# Patient Record
Sex: FEMALE | Race: WHITE | Employment: PART TIME | URBAN - METROPOLITAN AREA
[De-identification: names, ages, dates, MRNs, and addresses within clinical notes are randomized per-mention and may not be internally consistent; named-entity substitution may affect disease eponyms.]

---

## 2020-06-23 ENCOUNTER — OFFICE VISIT (OUTPATIENT)
Dept: VASCULAR SURGERY | Age: 60
End: 2020-06-23
Payer: COMMERCIAL

## 2020-06-23 VITALS
SYSTOLIC BLOOD PRESSURE: 90 MMHG | DIASTOLIC BLOOD PRESSURE: 68 MMHG | BODY MASS INDEX: 23.85 KG/M2 | WEIGHT: 134.6 LBS | HEIGHT: 63 IN

## 2020-06-23 PROCEDURE — 99203 OFFICE O/P NEW LOW 30 MIN: CPT | Performed by: SURGERY

## 2020-06-23 RX ORDER — AMLODIPINE BESYLATE 10 MG/1
10 TABLET ORAL DAILY
COMMUNITY

## 2020-06-23 RX ORDER — ASPIRIN 81 MG/1
81 TABLET, CHEWABLE ORAL DAILY
COMMUNITY

## 2020-06-23 RX ORDER — TRAZODONE HYDROCHLORIDE 100 MG/1
200 TABLET ORAL NIGHTLY
COMMUNITY

## 2020-06-23 RX ORDER — ESCITALOPRAM OXALATE 10 MG/1
10 TABLET ORAL DAILY
COMMUNITY

## 2020-06-23 RX ORDER — METOPROLOL SUCCINATE 50 MG/1
100 TABLET, EXTENDED RELEASE ORAL DAILY
COMMUNITY

## 2020-06-23 RX ORDER — ALPRAZOLAM 0.5 MG/1
0.5 TABLET ORAL 3 TIMES DAILY PRN
COMMUNITY

## 2020-06-23 RX ORDER — PANTOPRAZOLE SODIUM 40 MG/1
40 GRANULE, DELAYED RELEASE ORAL NIGHTLY
COMMUNITY

## 2020-06-23 RX ORDER — LISINOPRIL 20 MG/1
20 TABLET ORAL DAILY
Status: ON HOLD | COMMUNITY
End: 2022-09-27 | Stop reason: HOSPADM

## 2020-06-23 SDOH — HEALTH STABILITY: MENTAL HEALTH: HOW OFTEN DO YOU HAVE A DRINK CONTAINING ALCOHOL?: 2-3 TIMES A WEEK

## 2020-06-23 NOTE — PROGRESS NOTES
[facility])  Consultation/History & Physical    Date of Admission:  (Not on file)  Date of Consultation:  6/23/2020    PCP:  No primary care provider on file. Other:  Aurelio    Chief Complaint:    Chief Complaint   Patient presents with    Other     patient ref by DR Rod Chi for carotid artery stenosis. pamlr           History of Present Illness:  Antwan Rod is a 62 y.o. female who presents with history of hypertension and tobacco abuse. She's had 2 episodes of transient left eye visual loss. The last one was 3 weeks ago. She's been started on aspirin. Carotid duplex was performed showing 80-99% left ICA stenosis and as a result referred for vascular surgery. PMH:   has no past medical history on file. PSH:   has no past surgical history on file. Allergies: Allergies   Allergen Reactions    Penicillins     Nuts [Peanut-Containing Drug Products]         Home Meds:    Prior to Admission medications    Not on [facility] Meds:    No current outpatient medications on file. No current facility-administered medications for this visit.         Social History:       Social History     Socioeconomic History    Marital status: None     Spouse name: None    Number of children: None    Years of education: None    Highest education level: None   Occupational History    None   Social Needs    Financial resource strain: None    Food insecurity     Worry: None     Inability: None    Transportation needs     Medical: None     Non-medical: None   Tobacco Use    Smoking status: Current Every Day Smoker     Packs/day: 0.50     Types: Cigarettes    Smokeless tobacco: Never Used   Substance and Sexual Activity    Alcohol use: Yes     Frequency: 2-3 times a week    Drug use: None    Sexual activity: None   Lifestyle    Physical activity     Days per week: None     Minutes per session: None    Stress: None   Relationships    Social connections     Talks on phone: None     Gets

## 2020-06-25 ENCOUNTER — TELEPHONE (OUTPATIENT)
Dept: VASCULAR SURGERY | Age: 60
End: 2020-06-25

## 2020-06-29 ENCOUNTER — TELEPHONE (OUTPATIENT)
Dept: VASCULAR SURGERY | Age: 60
End: 2020-06-29

## 2020-06-29 NOTE — TELEPHONE ENCOUNTER
Patient ended up returning my call as I left messages with her son. She did give me an updated phone number as well as we had wrong home phone number. She was notified of her 7/20/20 surgery date and to arrive at 5:30am for 7:30am surgery. Npo after midnight. She was told needs to get COVID test 7 days prior to surgery. As well as pre admission testing. [email or web address]

## 2020-06-30 ENCOUNTER — PREP FOR PROCEDURE (OUTPATIENT)
Dept: VASCULAR SURGERY | Age: 60
End: 2020-06-30

## 2020-06-30 RX ORDER — SODIUM CHLORIDE 0.9 % (FLUSH) 0.9 %
10 SYRINGE (ML) INJECTION PRN
Status: CANCELLED | OUTPATIENT
Start: 2020-06-30

## 2020-06-30 RX ORDER — SODIUM CHLORIDE 0.9 % (FLUSH) 0.9 %
10 SYRINGE (ML) INJECTION EVERY 12 HOURS SCHEDULED
Status: CANCELLED | OUTPATIENT
Start: 2020-06-30

## 2020-07-13 ENCOUNTER — TELEPHONE (OUTPATIENT)
Dept: VASCULAR SURGERY | Age: 60
End: 2020-07-13

## 2020-07-13 ENCOUNTER — OFFICE VISIT (OUTPATIENT)
Dept: PRIMARY CARE CLINIC | Age: 60
End: 2020-07-13
Payer: COMMERCIAL

## 2020-07-13 ENCOUNTER — HOSPITAL ENCOUNTER (OUTPATIENT)
Age: 60
Discharge: HOME | End: 2020-07-13
Payer: COMMERCIAL

## 2020-07-13 LAB
ABO/RH: NORMAL
ANION GAP SERPL CALCULATED.3IONS-SCNC: 13 MMOL/L (ref 3–16)
ANTIBODY SCREEN: NORMAL
APTT: 33.7 SEC (ref 24.2–36.2)
BILIRUBIN URINE: NEGATIVE
BLOOD, URINE: ABNORMAL
BUN BLDV-MCNC: 21 MG/DL (ref 7–20)
CALCIUM SERPL-MCNC: 9.8 MG/DL (ref 8.3–10.6)
CHLORIDE BLD-SCNC: 81 MMOL/L (ref 99–110)
CLARITY: CLEAR
CO2: 26 MMOL/L (ref 21–32)
COLOR: YELLOW
CREAT SERPL-MCNC: 0.8 MG/DL (ref 0.6–1.2)
EPITHELIAL CELLS, UA: 2 /HPF (ref 0–5)
GFR AFRICAN AMERICAN: >60
GFR NON-AFRICAN AMERICAN: >60
GLUCOSE BLD-MCNC: 98 MG/DL (ref 70–99)
GLUCOSE URINE: NEGATIVE MG/DL
HCT VFR BLD CALC: 42 % (ref 36–48)
HEMOGLOBIN: 14.6 G/DL (ref 12–16)
HYALINE CASTS: 0 /LPF (ref 0–8)
INR BLD: 0.87 (ref 0.86–1.14)
KETONES, URINE: NEGATIVE MG/DL
LEUKOCYTE ESTERASE, URINE: NEGATIVE
MCH RBC QN AUTO: 31.1 PG (ref 26–34)
MCHC RBC AUTO-ENTMCNC: 34.7 G/DL (ref 31–36)
MCV RBC AUTO: 89.4 FL (ref 80–100)
MICROSCOPIC EXAMINATION: YES
NITRITE, URINE: NEGATIVE
PDW BLD-RTO: 13.5 % (ref 12.4–15.4)
PH UA: 7 (ref 5–8)
PLATELET # BLD: 225 K/UL (ref 135–450)
PMV BLD AUTO: 6.9 FL (ref 5–10.5)
POTASSIUM SERPL-SCNC: 3.9 MMOL/L (ref 3.5–5.1)
PROTEIN UA: NEGATIVE MG/DL
PROTHROMBIN TIME: 10.1 SEC (ref 10–13.2)
RBC # BLD: 4.69 M/UL (ref 4–5.2)
RBC UA: 4 /HPF (ref 0–4)
SODIUM BLD-SCNC: 120 MMOL/L (ref 136–145)
SPECIFIC GRAVITY UA: 1.01 (ref 1–1.03)
URINE TYPE: ABNORMAL
UROBILINOGEN, URINE: 0.2 E.U./DL
WBC # BLD: 9.6 K/UL (ref 4–11)
WBC UA: 0 /HPF (ref 0–5)

## 2020-07-13 PROCEDURE — 86901 BLOOD TYPING SEROLOGIC RH(D): CPT

## 2020-07-13 PROCEDURE — 36415 COLL VENOUS BLD VENIPUNCTURE: CPT

## 2020-07-13 PROCEDURE — 86900 BLOOD TYPING SEROLOGIC ABO: CPT

## 2020-07-13 PROCEDURE — 85730 THROMBOPLASTIN TIME PARTIAL: CPT

## 2020-07-13 PROCEDURE — 99211 OFF/OP EST MAY X REQ PHY/QHP: CPT | Performed by: NURSE PRACTITIONER

## 2020-07-13 PROCEDURE — 93005 ELECTROCARDIOGRAM TRACING: CPT | Performed by: ANESTHESIOLOGY

## 2020-07-13 PROCEDURE — 85027 COMPLETE CBC AUTOMATED: CPT

## 2020-07-13 PROCEDURE — 85610 PROTHROMBIN TIME: CPT

## 2020-07-13 PROCEDURE — 80048 BASIC METABOLIC PNL TOTAL CA: CPT

## 2020-07-13 PROCEDURE — 86850 RBC ANTIBODY SCREEN: CPT

## 2020-07-13 PROCEDURE — 81001 URINALYSIS AUTO W/SCOPE: CPT

## 2020-07-13 NOTE — PATIENT INSTRUCTIONS

## 2020-07-13 NOTE — TELEPHONE ENCOUNTER
----- Message from ROOSEVELT Lewis CNP sent at 7/13/2020  1:21 PM EDT -----  Patient is scheduled for left CEA on 7/20. Her pre-op labs show sodium of 120. Can you fax results to PCP and she will need to follow up with them regarding this prior to surgery. Thanks.

## 2020-07-13 NOTE — PROGRESS NOTES
Lauren Chris received a viral test for COVID-19. They were educated on isolation and quarantine as appropriate. For any symptoms, they were directed to seek care from their PCP, given contact information to establish with a doctor, directed to an urgent care or the emergency room. Patient was seen today for pre op Covid testing.

## 2020-07-14 ENCOUNTER — HOSPITAL ENCOUNTER (INPATIENT)
Age: 60
LOS: 7 days | Discharge: HOME | DRG: 629 | End: 2020-07-21
Attending: EMERGENCY MEDICINE | Admitting: INTERNAL MEDICINE
Payer: COMMERCIAL

## 2020-07-14 ENCOUNTER — APPOINTMENT (OUTPATIENT)
Dept: CT IMAGING | Age: 60
DRG: 629 | End: 2020-07-14
Payer: COMMERCIAL

## 2020-07-14 PROBLEM — E87.1 HYPONATREMIA: Status: ACTIVE | Noted: 2020-07-14

## 2020-07-14 LAB
ABO/RH: NORMAL
ANION GAP SERPL CALCULATED.3IONS-SCNC: 14 MMOL/L (ref 3–16)
ANTIBODY SCREEN: NORMAL
BASOPHILS ABSOLUTE: 0 K/UL (ref 0–0.2)
BASOPHILS RELATIVE PERCENT: 0.7 %
BUN BLDV-MCNC: 18 MG/DL (ref 7–20)
CALCIUM SERPL-MCNC: 10.4 MG/DL (ref 8.3–10.6)
CHLORIDE BLD-SCNC: 82 MMOL/L (ref 99–110)
CO2: 26 MMOL/L (ref 21–32)
CREAT SERPL-MCNC: 0.8 MG/DL (ref 0.6–1.2)
EKG ATRIAL RATE: 51 BPM
EKG DIAGNOSIS: NORMAL
EKG P AXIS: 50 DEGREES
EKG P-R INTERVAL: 148 MS
EKG Q-T INTERVAL: 482 MS
EKG QRS DURATION: 90 MS
EKG QTC CALCULATION (BAZETT): 444 MS
EKG R AXIS: 30 DEGREES
EKG T AXIS: 51 DEGREES
EKG VENTRICULAR RATE: 51 BPM
EOSINOPHILS ABSOLUTE: 0.2 K/UL (ref 0–0.6)
EOSINOPHILS RELATIVE PERCENT: 3.1 %
GFR AFRICAN AMERICAN: >60
GFR NON-AFRICAN AMERICAN: >60
GLUCOSE BLD-MCNC: 92 MG/DL (ref 70–99)
HCT VFR BLD CALC: 43.2 % (ref 36–48)
HEMOGLOBIN: 15.2 G/DL (ref 12–16)
INR BLD: 0.96 (ref 0.86–1.14)
LYMPHOCYTES ABSOLUTE: 1.2 K/UL (ref 1–5.1)
LYMPHOCYTES RELATIVE PERCENT: 21.6 %
MAGNESIUM: 2 MG/DL (ref 1.8–2.4)
MCH RBC QN AUTO: 31.3 PG (ref 26–34)
MCHC RBC AUTO-ENTMCNC: 35.1 G/DL (ref 31–36)
MCV RBC AUTO: 89.2 FL (ref 80–100)
MONOCYTES ABSOLUTE: 0.4 K/UL (ref 0–1.3)
MONOCYTES RELATIVE PERCENT: 7.1 %
NEUTROPHILS ABSOLUTE: 3.8 K/UL (ref 1.7–7.7)
NEUTROPHILS RELATIVE PERCENT: 67.5 %
PDW BLD-RTO: 13.5 % (ref 12.4–15.4)
PLATELET # BLD: 253 K/UL (ref 135–450)
PMV BLD AUTO: 6.4 FL (ref 5–10.5)
POTASSIUM REFLEX MAGNESIUM: 3.4 MMOL/L (ref 3.5–5.1)
PROTHROMBIN TIME: 11.1 SEC (ref 10–13.2)
RBC # BLD: 4.84 M/UL (ref 4–5.2)
SODIUM BLD-SCNC: 122 MMOL/L (ref 136–145)
WBC # BLD: 5.6 K/UL (ref 4–11)

## 2020-07-14 PROCEDURE — 70450 CT HEAD/BRAIN W/O DYE: CPT

## 2020-07-14 PROCEDURE — 36415 COLL VENOUS BLD VENIPUNCTURE: CPT

## 2020-07-14 PROCEDURE — 6360000002 HC RX W HCPCS: Performed by: PHYSICIAN ASSISTANT

## 2020-07-14 PROCEDURE — 82533 TOTAL CORTISOL: CPT

## 2020-07-14 PROCEDURE — 93005 ELECTROCARDIOGRAM TRACING: CPT | Performed by: EMERGENCY MEDICINE

## 2020-07-14 PROCEDURE — 2580000003 HC RX 258: Performed by: PHYSICIAN ASSISTANT

## 2020-07-14 PROCEDURE — 83735 ASSAY OF MAGNESIUM: CPT

## 2020-07-14 PROCEDURE — 93010 ELECTROCARDIOGRAM REPORT: CPT | Performed by: INTERNAL MEDICINE

## 2020-07-14 PROCEDURE — 6370000000 HC RX 637 (ALT 250 FOR IP): Performed by: PHYSICIAN ASSISTANT

## 2020-07-14 PROCEDURE — 85025 COMPLETE CBC W/AUTO DIFF WBC: CPT

## 2020-07-14 PROCEDURE — 86901 BLOOD TYPING SEROLOGIC RH(D): CPT

## 2020-07-14 PROCEDURE — 6370000000 HC RX 637 (ALT 250 FOR IP): Performed by: EMERGENCY MEDICINE

## 2020-07-14 PROCEDURE — 80048 BASIC METABOLIC PNL TOTAL CA: CPT

## 2020-07-14 PROCEDURE — 84443 ASSAY THYROID STIM HORMONE: CPT

## 2020-07-14 PROCEDURE — 84550 ASSAY OF BLOOD/URIC ACID: CPT

## 2020-07-14 PROCEDURE — 70496 CT ANGIOGRAPHY HEAD: CPT

## 2020-07-14 PROCEDURE — 83930 ASSAY OF BLOOD OSMOLALITY: CPT

## 2020-07-14 PROCEDURE — 96374 THER/PROPH/DIAG INJ IV PUSH: CPT

## 2020-07-14 PROCEDURE — 96375 TX/PRO/DX INJ NEW DRUG ADDON: CPT

## 2020-07-14 PROCEDURE — 86850 RBC ANTIBODY SCREEN: CPT

## 2020-07-14 PROCEDURE — 85610 PROTHROMBIN TIME: CPT

## 2020-07-14 PROCEDURE — 2060000000 HC ICU INTERMEDIATE R&B

## 2020-07-14 PROCEDURE — 86900 BLOOD TYPING SEROLOGIC ABO: CPT

## 2020-07-14 PROCEDURE — 99285 EMERGENCY DEPT VISIT HI MDM: CPT

## 2020-07-14 PROCEDURE — 6360000004 HC RX CONTRAST MEDICATION: Performed by: EMERGENCY MEDICINE

## 2020-07-14 PROCEDURE — 6370000000 HC RX 637 (ALT 250 FOR IP)

## 2020-07-14 RX ORDER — 0.9 % SODIUM CHLORIDE 0.9 %
500 INTRAVENOUS SOLUTION INTRAVENOUS ONCE
Status: COMPLETED | OUTPATIENT
Start: 2020-07-14 | End: 2020-07-14

## 2020-07-14 RX ORDER — ALPRAZOLAM 0.5 MG/1
0.5 TABLET ORAL NIGHTLY PRN
Status: DISCONTINUED | OUTPATIENT
Start: 2020-07-14 | End: 2020-07-21 | Stop reason: HOSPADM

## 2020-07-14 RX ORDER — DOXYCYCLINE HYCLATE 100 MG/1
CAPSULE ORAL
COMMUNITY
Start: 2019-01-26 | End: 2020-09-02

## 2020-07-14 RX ORDER — PROMETHAZINE HYDROCHLORIDE 25 MG/1
12.5 TABLET ORAL EVERY 6 HOURS PRN
Status: DISCONTINUED | OUTPATIENT
Start: 2020-07-14 | End: 2020-07-20

## 2020-07-14 RX ORDER — CLOPIDOGREL BISULFATE 75 MG/1
75 TABLET ORAL ONCE
Status: COMPLETED | OUTPATIENT
Start: 2020-07-14 | End: 2020-07-14

## 2020-07-14 RX ORDER — SODIUM CHLORIDE 0.9 % (FLUSH) 0.9 %
10 SYRINGE (ML) INJECTION PRN
Status: DISCONTINUED | OUTPATIENT
Start: 2020-07-14 | End: 2020-07-21 | Stop reason: HOSPADM

## 2020-07-14 RX ORDER — SODIUM CHLORIDE 0.9 % (FLUSH) 0.9 %
10 SYRINGE (ML) INJECTION EVERY 12 HOURS SCHEDULED
Status: DISCONTINUED | OUTPATIENT
Start: 2020-07-14 | End: 2020-07-21 | Stop reason: HOSPADM

## 2020-07-14 RX ORDER — SIMVASTATIN 20 MG
20 TABLET ORAL NIGHTLY
COMMUNITY
Start: 2020-06-10 | End: 2021-06-10

## 2020-07-14 RX ORDER — ACETAMINOPHEN 500 MG
1000 TABLET ORAL EVERY 6 HOURS PRN
COMMUNITY

## 2020-07-14 RX ORDER — SODIUM CHLORIDE 9 MG/ML
INJECTION, SOLUTION INTRAVENOUS CONTINUOUS
Status: DISCONTINUED | OUTPATIENT
Start: 2020-07-14 | End: 2020-07-15

## 2020-07-14 RX ORDER — ESCITALOPRAM OXALATE 10 MG/1
10 TABLET ORAL DAILY
Status: DISCONTINUED | OUTPATIENT
Start: 2020-07-15 | End: 2020-07-21 | Stop reason: HOSPADM

## 2020-07-14 RX ORDER — TIZANIDINE HYDROCHLORIDE 4 MG/1
12 CAPSULE, GELATIN COATED ORAL NIGHTLY
COMMUNITY

## 2020-07-14 RX ORDER — ASPIRIN 81 MG/1
324 TABLET, CHEWABLE ORAL ONCE
Status: COMPLETED | OUTPATIENT
Start: 2020-07-14 | End: 2020-07-14

## 2020-07-14 RX ORDER — LORATADINE 10 MG/1
10 CAPSULE, LIQUID FILLED ORAL DAILY
Status: ON HOLD | COMMUNITY
End: 2022-09-27 | Stop reason: HOSPADM

## 2020-07-14 RX ORDER — POTASSIUM CHLORIDE 7.45 MG/ML
10 INJECTION INTRAVENOUS PRN
Status: DISCONTINUED | OUTPATIENT
Start: 2020-07-14 | End: 2020-07-21 | Stop reason: HOSPADM

## 2020-07-14 RX ORDER — ALPRAZOLAM 0.5 MG/1
0.5 TABLET ORAL ONCE
Status: COMPLETED | OUTPATIENT
Start: 2020-07-14 | End: 2020-07-14

## 2020-07-14 RX ORDER — OFLOXACIN 3 MG/ML
5 SOLUTION AURICULAR (OTIC) DAILY
COMMUNITY
Start: 2020-07-10

## 2020-07-14 RX ORDER — ACETAMINOPHEN 325 MG/1
650 TABLET ORAL EVERY 6 HOURS PRN
Status: DISCONTINUED | OUTPATIENT
Start: 2020-07-14 | End: 2020-07-20 | Stop reason: DRUGHIGH

## 2020-07-14 RX ORDER — TRAZODONE HYDROCHLORIDE 50 MG/1
150 TABLET ORAL NIGHTLY
Status: DISCONTINUED | OUTPATIENT
Start: 2020-07-14 | End: 2020-07-21 | Stop reason: HOSPADM

## 2020-07-14 RX ORDER — DIPHENHYDRAMINE HYDROCHLORIDE 50 MG/ML
12.5 INJECTION INTRAMUSCULAR; INTRAVENOUS ONCE
Status: COMPLETED | OUTPATIENT
Start: 2020-07-14 | End: 2020-07-14

## 2020-07-14 RX ORDER — ASPIRIN 325 MG
325 TABLET ORAL DAILY
Status: DISCONTINUED | OUTPATIENT
Start: 2020-07-15 | End: 2020-07-21 | Stop reason: HOSPADM

## 2020-07-14 RX ORDER — FLUTICASONE PROPIONATE 50 MCG
1 SPRAY, SUSPENSION (ML) NASAL DAILY
Status: ON HOLD | COMMUNITY
End: 2022-09-27 | Stop reason: HOSPADM

## 2020-07-14 RX ORDER — PANTOPRAZOLE SODIUM 40 MG/1
40 TABLET, DELAYED RELEASE ORAL
Status: DISCONTINUED | OUTPATIENT
Start: 2020-07-14 | End: 2020-07-21 | Stop reason: HOSPADM

## 2020-07-14 RX ORDER — FLUTICASONE PROPIONATE 50 MCG
1 SPRAY, SUSPENSION (ML) NASAL DAILY
Status: DISCONTINUED | OUTPATIENT
Start: 2020-07-15 | End: 2020-07-21 | Stop reason: HOSPADM

## 2020-07-14 RX ORDER — CLOPIDOGREL BISULFATE 75 MG/1
75 TABLET ORAL DAILY
Status: DISCONTINUED | OUTPATIENT
Start: 2020-07-15 | End: 2020-07-21 | Stop reason: HOSPADM

## 2020-07-14 RX ORDER — METOCLOPRAMIDE HYDROCHLORIDE 5 MG/ML
10 INJECTION INTRAMUSCULAR; INTRAVENOUS ONCE
Status: COMPLETED | OUTPATIENT
Start: 2020-07-14 | End: 2020-07-14

## 2020-07-14 RX ORDER — MIDODRINE HYDROCHLORIDE 5 MG/1
5 TABLET ORAL ONCE
Status: COMPLETED | OUTPATIENT
Start: 2020-07-14 | End: 2020-07-14

## 2020-07-14 RX ORDER — DIAPER,BRIEF,INFANT-TODD,DISP
EACH MISCELLANEOUS
COMMUNITY

## 2020-07-14 RX ORDER — TIZANIDINE 4 MG/1
4 TABLET ORAL 3 TIMES DAILY
Status: DISCONTINUED | OUTPATIENT
Start: 2020-07-14 | End: 2020-07-21 | Stop reason: HOSPADM

## 2020-07-14 RX ORDER — ATORVASTATIN CALCIUM 10 MG/1
10 TABLET, FILM COATED ORAL NIGHTLY
Status: DISCONTINUED | OUTPATIENT
Start: 2020-07-14 | End: 2020-07-21 | Stop reason: HOSPADM

## 2020-07-14 RX ORDER — POTASSIUM CHLORIDE 20 MEQ/1
40 TABLET, EXTENDED RELEASE ORAL PRN
Status: DISCONTINUED | OUTPATIENT
Start: 2020-07-14 | End: 2020-07-21 | Stop reason: HOSPADM

## 2020-07-14 RX ORDER — ALPRAZOLAM 0.5 MG/1
TABLET ORAL
Status: COMPLETED
Start: 2020-07-14 | End: 2020-07-14

## 2020-07-14 RX ORDER — SODIUM CHLORIDE 9 MG/ML
INJECTION, SOLUTION INTRAVENOUS CONTINUOUS
Status: DISCONTINUED | OUTPATIENT
Start: 2020-07-14 | End: 2020-07-14

## 2020-07-14 RX ORDER — ONDANSETRON 2 MG/ML
4 INJECTION INTRAMUSCULAR; INTRAVENOUS EVERY 6 HOURS PRN
Status: DISCONTINUED | OUTPATIENT
Start: 2020-07-14 | End: 2020-07-20

## 2020-07-14 RX ORDER — LISINOPRIL AND HYDROCHLOROTHIAZIDE 20; 12.5 MG/1; MG/1
1 TABLET ORAL DAILY
Status: ON HOLD | COMMUNITY
End: 2020-07-21 | Stop reason: HOSPADM

## 2020-07-14 RX ORDER — KETOROLAC TROMETHAMINE 30 MG/ML
15 INJECTION, SOLUTION INTRAMUSCULAR; INTRAVENOUS ONCE
Status: COMPLETED | OUTPATIENT
Start: 2020-07-14 | End: 2020-07-14

## 2020-07-14 RX ORDER — ACETAMINOPHEN 650 MG/1
650 SUPPOSITORY RECTAL EVERY 6 HOURS PRN
Status: DISCONTINUED | OUTPATIENT
Start: 2020-07-14 | End: 2020-07-20 | Stop reason: DRUGHIGH

## 2020-07-14 RX ADMIN — ASPIRIN 324 MG: 81 TABLET, CHEWABLE ORAL at 20:28

## 2020-07-14 RX ADMIN — DIPHENHYDRAMINE HYDROCHLORIDE 12.5 MG: 50 INJECTION, SOLUTION INTRAMUSCULAR; INTRAVENOUS at 16:42

## 2020-07-14 RX ADMIN — MIDODRINE HYDROCHLORIDE 5 MG: 5 TABLET ORAL at 21:51

## 2020-07-14 RX ADMIN — CLOPIDOGREL 75 MG: 75 TABLET, FILM COATED ORAL at 20:28

## 2020-07-14 RX ADMIN — SODIUM CHLORIDE: 9 INJECTION, SOLUTION INTRAVENOUS at 18:19

## 2020-07-14 RX ADMIN — KETOROLAC TROMETHAMINE 15 MG: 30 INJECTION, SOLUTION INTRAMUSCULAR at 16:42

## 2020-07-14 RX ADMIN — ALPRAZOLAM 0.5 MG: 0.5 TABLET ORAL at 16:44

## 2020-07-14 RX ADMIN — IOPAMIDOL 75 ML: 755 INJECTION, SOLUTION INTRAVENOUS at 17:36

## 2020-07-14 RX ADMIN — METOCLOPRAMIDE HYDROCHLORIDE 10 MG: 5 INJECTION INTRAMUSCULAR; INTRAVENOUS at 16:42

## 2020-07-14 RX ADMIN — SODIUM CHLORIDE 500 ML: 9 INJECTION, SOLUTION INTRAVENOUS at 16:42

## 2020-07-14 ASSESSMENT — PAIN DESCRIPTION - ORIENTATION: ORIENTATION: RIGHT;LEFT

## 2020-07-14 ASSESSMENT — PAIN DESCRIPTION - ONSET
ONSET: ON-GOING
ONSET: ON-GOING

## 2020-07-14 ASSESSMENT — PAIN DESCRIPTION - PAIN TYPE
TYPE: ACUTE PAIN

## 2020-07-14 ASSESSMENT — PAIN DESCRIPTION - DESCRIPTORS
DESCRIPTORS: POUNDING
DESCRIPTORS: POUNDING

## 2020-07-14 ASSESSMENT — ENCOUNTER SYMPTOMS
SORE THROAT: 0
ABDOMINAL PAIN: 0
COUGH: 0
VOMITING: 0
NAUSEA: 0
DIARRHEA: 0
BACK PAIN: 0
EYE PAIN: 0
SHORTNESS OF BREATH: 0
RHINORRHEA: 0
CONSTIPATION: 0

## 2020-07-14 ASSESSMENT — PAIN DESCRIPTION - FREQUENCY
FREQUENCY: CONTINUOUS
FREQUENCY: CONTINUOUS

## 2020-07-14 ASSESSMENT — PAIN SCALES - GENERAL
PAINLEVEL_OUTOF10: 10

## 2020-07-14 ASSESSMENT — PAIN DESCRIPTION - LOCATION
LOCATION: HEAD

## 2020-07-14 NOTE — ED PROVIDER NOTES
sinus  Axis: normal  Intervals: normal AL, narrow QRS, normal QTc  ST segments: no ST elevations or depressions  T waves: no abnormal inversions  Non-specific T wave changes: not present  Prior EKG comparison: EKG dated yesterday is not significantly different    MDM:  Diagnostic considerations included migraine, meningitis, subarachnoid hemorrhage, head injury/trauma, cluster headache, intracranial bleed/mass, stroke'    ED course was notable for notable hyponatremia to the point where I do feel she requires admission to the hospital.  She has no focal acute neurologic deficits on examination and was scheduled for carotid endarterectomy on the left on Monday. However given the electrolyte abnormality she will be admitted. CTA does confirm near occlusive disease to the left carotid although she has no strokelike symptoms. Dr. Alphonso Spangler from vascular surgery was consulted about the patient's ED history, physical, workup, and course so far. Recommendations from this consultant included antiplatelet agents only and they will follow. During the patient's ED course, the patient was given:  Medications   0.9 % sodium chloride infusion ( Intravenous New Bag 7/14/20 1819)   0.9 % sodium chloride bolus (0 mLs Intravenous Stopped 7/14/20 1756)   ketorolac (TORADOL) injection 15 mg (15 mg Intravenous Given 7/14/20 1642)   metoclopramide (REGLAN) injection 10 mg (10 mg Intravenous Given 7/14/20 1642)   diphenhydrAMINE (BENADRYL) injection 12.5 mg (12.5 mg Intravenous Given 7/14/20 1642)   ALPRAZolam (XANAX) tablet 0.5 mg (0.5 mg Oral Given 7/14/20 1644)   iopamidol (ISOVUE-370) 76 % injection 75 mL (75 mLs Intravenous Given 7/14/20 1736)        CLINICAL IMPRESSION  1. Hyponatremia    2. Acute nonintractable headache, unspecified headache type    3. Left carotid artery occlusion        DISPOSITION  Neelam Carvajal was admitted in fair condition.     The plan is to admit to the hospital at this time under the

## 2020-07-14 NOTE — H&P
outpatient by vascular surgery on 6/23/20 and L carotid endarterectomy was scheduled for 7/20/20. Work-up initiated in ED. CT head negative for acute process. CTA head and neck did confirm near total occlusion of proximal L ICA without full distal lumen collapse. Sodium is 122, up from 120 yesterday. Of note, patient did have hyponatremia noted on outpatient labs obrained 6/2/20 (Na 125). However, this had resolved without intervention upon hospital admission 6/8/20 (Na 137). Potassium 3.4. No evidence of kidney failure. No leukocytosis or anemia. UA obtained outpatient yesterday was negative for UTI. EKG shows sinus bradycardia, rate 47, without evidence of acute ischemia or infarction and not significantly changed from EKG obtained yesterday. Past Medical History:    Patient  has a past medical history of Anxiety, Arthritis, Hepatitis C, Hyperlipidemia, Hypertension, and Reflux esophagitis. Past Surgical History:    Patient  has a past surgical history that includes joint replacement and fracture surgery. Medications Prior to Admission:      Prior to Admission medications    Medication Sig Start Date End Date Taking?  Authorizing Provider   simvastatin (ZOCOR) 20 MG tablet Take 20 mg by mouth nightly 6/10/20 6/10/21 Yes Historical Provider, MD   calcium citrate-vitamin d (CALCIUM CITRATE + D) 250-200 MG-UNIT TABS Take by mouth   Yes Historical Provider, MD   doxycycline hyclate (VIBRAMYCIN) 100 MG capsule  1/26/19  Yes Historical Provider, MD   loratadine (CLARITIN) 10 MG capsule Take 10 mg by mouth daily   Yes Historical Provider, MD   fluticasone (FLONASE) 50 MCG/ACT nasal spray 1 spray by Each Nostril route daily   Yes Historical Provider, MD   ofloxacin (FLOXIN) 0.3 % otic solution Place 5 drops in ear(s) daily 7/10/20  Yes Historical Provider, MD   lisinopril (PRINIVIL;ZESTRIL) 20 MG tablet Take 20 mg by mouth daily 12.5mg   Yes Historical Provider, MD   ALPRAZolam (XANAX) 0.5 MG tablet Take 0.5 mg by mouth nightly as needed for Sleep. Yes Historical Provider, MD   aspirin 81 MG chewable tablet Take 81 mg by mouth daily   Yes Historical Provider, MD   metoprolol succinate (TOPROL XL) 50 MG extended release tablet Take 100 mg by mouth 2 times daily    Yes Historical Provider, MD   traZODone (DESYREL) 100 MG tablet Take 150 mg by mouth nightly    Yes Historical Provider, MD   escitalopram (LEXAPRO) 10 MG tablet Take 10 mg by mouth daily   Yes Historical Provider, MD   pantoprazole sodium (PROTONIX) 40 MG PACK packet Take 40 mg by mouth 2 times daily (before meals)    Yes Historical Provider, MD   tiZANidine (ZANAFLEX) 4 MG capsule Take 4 mg by mouth 3 times daily    Historical Provider, MD   amLODIPine (NORVASC) 10 MG tablet Take 10 mg by mouth daily    Historical Provider, MD       Allergies:  Penicillins; Nut  [macadamia nut oil]; Peanut (diagnostic); and Peanut-containing drug products    Social History:      TOBACCO:   reports that she has been smoking cigarettes. She has been smoking about 0.50 packs per day. She has never used smokeless tobacco.  ETOH:   reports current alcohol use. DRUGS:  reports no history of drug use. Family History:      Reviewed in detail positive as follows:    History reviewed. No pertinent family history. REVIEW OF SYSTEMS:   Pertinent positives as noted in the HPI. All other systems reviewed and negative. PHYSICAL EXAM PERFORMED:    BP 97/67   Pulse 50   Temp 97.5 °F (36.4 °C) (Temporal)   Resp 20   Ht 5' 3\" (1.6 m)   Wt 130 lb (59 kg)   SpO2 100%   BMI 23.03 kg/m²     General appearance:  Awake, alert, no apparent distress  HEENT:  Normocephalic, atraumatic without obvious deformity. PERRL. EOM intact. Conjunctivae/corneas clear. Neck: Supple, with full range of motion. No JVD. Trachea midline. Respiratory:  Clear to auscultation bilaterally without rales, wheezes, or rhonchi. Normal respiratory effort. Cardiovascular:  +Bradycardia.  Regular rhythm without murmurs, rubs or gallops. Abdomen: Soft, NT, ND, without rebound or guarding. Normal bowel sounds. Extremities:  No clubbing, cyanosis, or edema bilaterally. Full range of motion without deformity. +2 palpable pulses, equal bilaterally. Capillary refill brisk,< 3 seconds   Skin: No rashes or lesions. Warm/dry. Neurologic:  Neurovascularly intact without any focal sensory/motor deficits. Cranial nerves: II-XII intact, grossly non-focal. Alert and oriented x 3. Normal speech. Psychiatric:  Thought content appropriate, normal insight. Labs:   CBC   Recent Labs     07/13/20  0810 07/14/20  1617   WBC 9.6 5.6   HGB 14.6 15.2   HCT 42.0 43.2    253        RENAL  Recent Labs     07/14/20  1617   *   K 3.4*   CL 82*   CO2 26   BUN 18   CREATININE 0.8       LFTS  No results for input(s): AST, ALT, ALB, BILIDIR, BILITOT, ALKPHOS in the last 72 hours. COAG  Recent Labs     07/14/20  1617   INR 0.96       CARDIAC ENZYMES  No results for input(s): TROPONINI in the last 72 hours. LIPIDS  No results found for: CHOL, TRIG, HDL, LDLCALC      Radiology:     CTA HEAD NECK W CONTRAST   Preliminary Result   Near-total occlusion of the proximal left internal carotid artery without   full distal lumen collapse. RECOMMENDATIONS:   Eber Hooks. Carotid Near-Occlusion: A Comprehensive Review, Part   1--Definition, Terminology, and Diagnosis. AJNR Am J Neuroradiol. 2016   Jan;37(1):2-10. doi: 10.3174/ajnr. . Epub 2015 Aug 27. Review. PubMed   PMID: 03013950.   [employer].. org/content/ajnr/early/2015/09/24/ajnr. .full. pdf         CT HEAD WO CONTRAST   Final Result   No acute intracranial abnormality.              EKG:   Read by ED physician in the absence of a cardiologist:  \"Rate: bradycardia with a rate of 47  Rhythm: sinus  Axis: normal  Intervals: normal [place], narrow QRS, normal QTc  ST segments: no ST elevations or depressions  T waves: no abnormal inversions  Non-specific T wave changes: not present  Prior EKG comparison: EKG dated yesterday is not significantly different\"      ASSESSMENT/PLAN:    Hyponatremia   Severe. 6/2/20 Na 125 > 6/9/20 Na 140  Correction with IV NS with a goal not to exceed 0.5 mEq/Hr. Check urine Na, urine Cr, urine and serum osmolality, TSH, cortisol, and uric acid  Nephrology consulted    L ICA stenosis  Had 2 episodes of amaurosis fugax, last episode over 6 weeks ago  Currently no deficits appreciated on neuro exam  Endarterectomy scheduled for 7/20/20  Vascular surgery consulted in ED and plavix and ASA recommended and Vascular surgery will follow up in the morning. Headache  Suspect due to hyponatremia  L ICA stenosis stable and no neurological deficits on exam  Supportive care  Neuro checks Q shift    Hypokalemia  Replace PRN and recheck    Hypotension  Improved with midodrine x 1  Continue IVF for hyponatremia    HTN  Hold home norvasc, metoprolol, and lisinopril for hypotension      DVT prophylaxis: Lovenox  GI prophylaxis: Protonix  Probiotic if on abx: N/A    Diet: DIET GENERAL;  Code Status: Full Code    Consults:  IP CONSULT TO VASCULAR SURGERY  IP CONSULT TO HOSPITALIST  IP CONSULT TO NEPHROLOGY    Disposition: Admit to Inpatient   ELOS: Greater than two midnights due to medical therapy     Isaias Moreno PA-C    Thank you Enrrique Talbert, APRN-CNP, APRN - NP for the opportunity to be involved in this patient's care. If you have any questions or concerns please feel free to contact me at [phone].

## 2020-07-14 NOTE — ED PROVIDER NOTES
[facility]        Pt Name: Rick Martines  MRN: [id number]  [place] 1960  Date of evaluation: 7/14/2020  Provider: Michi Phelps PA-C  PCP: Chucho Roblero, ROOSEVELT-CNP, ROOSEVELT - NP     I have seen and evaluated this patient with my supervising physician ADRIANNA, [facility]       Chief Complaint   Patient presents with    Headache     headache 1 week, carotid artery clogged, sx next monday for carotid. was sent her for an admission. HISTORY OF PRESENT ILLNESS   (Location/Symptom, Timing/Onset, Context/Setting, Quality, Duration, Modifying Factors, Severity)  Note limiting factors. Rick Martines is a 62 y.o. female who presents here to the emergency department, she states that she has had a chronic headache for the past 1 to 2 weeks. And according to her family medicine doctor he thinks that is related to her need for a carotid endarterectomy. She is scheduled to have this done next Monday, however was told that she is now hyponatremic and she will need to be evaluated and possibly admitted for this. She admits to acute headache/chronic headache, pain level 10/10. Nothing seems to make her feel better or worse. Nursing Notes were all reviewed and agreed with or any disagreements were addressed  in the HPI. REVIEW OF SYSTEMS    (2-9 systems for level 4, 10 or more for level 5)     Review of Systems   Constitutional: Negative for chills, diaphoresis and fever. HENT: Negative for congestion, ear pain, rhinorrhea and sore throat. Eyes: Negative for pain and visual disturbance. Respiratory: Negative for cough and shortness of breath. Cardiovascular: Negative for chest pain, palpitations and leg swelling. Gastrointestinal: Negative for abdominal pain, constipation, diarrhea, nausea and vomiting. Genitourinary: Positive for frequency.  Negative for decreased urine volume, Social History     Socioeconomic History    Marital status:      Spouse name: None    Number of children: None    Years of education: None    Highest education level: None   Occupational History    None   Social Needs    Financial resource strain: None    Food insecurity     Worry: None     Inability: None    Transportation needs     Medical: None     Non-medical: None   Tobacco Use    Smoking status: Current Every Day Smoker     Packs/day: 0.50     Types: Cigarettes    Smokeless tobacco: Never Used   Substance and Sexual Activity    Alcohol use: Yes     Frequency: 2-3 times a week     Comment: soc    Drug use: Never    Sexual activity: None   Lifestyle    Physical activity     Days per week: None     Minutes per session: None    Stress: None   Relationships    Social connections     Talks on phone: None     Gets together: None     Attends Jainism service: None     Active member of club or organization: None     Attends meetings of clubs or organizations: None     Relationship status: None    Intimate partner violence     Fear of current or ex partner: None     Emotionally abused: None     Physically abused: None     Forced sexual activity: None   Other Topics Concern    None   Social History Narrative    None       SCREENINGS             PHYSICAL EXAM    (up to 7 for level 4, 8 or more for level 5)     ED Triage Vitals [07/14/20 1132]   BP Temp Temp Source Pulse Resp SpO2 Height Weight   108/66 97.5 °F (36.4 °C) Temporal 52 18 99 % 5' 3\" (1.6 m) 130 lb (59 kg)       Physical Exam  Vitals signs and nursing note reviewed. Constitutional:       Appearance: She is well-developed. She is not diaphoretic. HENT:      Head: Normocephalic and atraumatic. Right Ear: External ear normal.      Left Ear: External ear normal.      Nose: Nose normal.   Eyes:      General:         Right eye: No discharge. Left eye: No discharge.    Neck:      Musculoskeletal: Normal range of motion and neck supple. Cardiovascular:      Rate and Rhythm: Normal rate and regular rhythm. Heart sounds: Normal heart sounds. No murmur. No friction rub. No gallop. Pulmonary:      Effort: Pulmonary effort is normal. No respiratory distress. Breath sounds: Normal breath sounds. No stridor. No wheezing or rales. Chest:      Chest wall: No tenderness. Musculoskeletal: Normal range of motion. Skin:     General: Skin is warm and dry. Coloration: Skin is not pale. Neurological:      General: No focal deficit present. Mental Status: She is alert and oriented to person, place, and time. Cranial Nerves: No cranial nerve deficit. Sensory: No sensory deficit. Motor: No weakness. Coordination: Coordination normal.      Gait: Gait normal.   Psychiatric:         Behavior: Behavior normal.         DIAGNOSTIC RESULTS   LABS:    Labs Reviewed   CBC WITH AUTO DIFFERENTIAL   BASIC METABOLIC PANEL W/ REFLEX TO MG FOR LOW K   PROTIME-INR   TYPE AND SCREEN       All other labs were within normal range or not returned as of this dictation. EKG: All EKG's are interpreted by the Emergency Department Physician who either signs orCo-signs this chart in the absence of a cardiologist.  Please see their note for interpretation of EKG. RADIOLOGY:   Non-plain film images such as CT, Ultrasound and MRI are read by the radiologist. Plain radiographic images are visualized andpreliminarily interpreted by the  ED Provider with the below findings:        Interpretation [facility] Radiologist below, if available at the time of this note:    No orders to display     No results found.       PROCEDURES   Unless otherwise noted below, none     Procedures    CRITICAL CARE TIME   N/A    CONSULTS:  None      EMERGENCY DEPARTMENT COURSE and DIFFERENTIAL DIAGNOSIS/MDM:   Vitals:    Vitals:    07/14/20 1132   BP: 108/66   Pulse: 52   Resp: 18   Temp: 97.5 °F (36.4 °C)   TempSrc: Temporal   SpO2: 99%   Weight:

## 2020-07-15 PROBLEM — D72.819 LEUKOPENIA: Status: ACTIVE | Noted: 2020-07-15

## 2020-07-15 PROBLEM — I65.22 INTERNAL CAROTID ARTERY OCCLUSION, LEFT: Status: ACTIVE | Noted: 2020-07-15

## 2020-07-15 PROBLEM — I10 HTN (HYPERTENSION): Status: ACTIVE | Noted: 2020-07-15

## 2020-07-15 PROBLEM — B19.20 HEPATITIS C: Status: ACTIVE | Noted: 2020-07-15

## 2020-07-15 PROBLEM — E87.6 HYPOKALEMIA: Status: ACTIVE | Noted: 2020-07-15

## 2020-07-15 LAB
ANION GAP SERPL CALCULATED.3IONS-SCNC: 9 MMOL/L (ref 3–16)
BUN BLDV-MCNC: 19 MG/DL (ref 7–20)
CALCIUM SERPL-MCNC: 9.3 MG/DL (ref 8.3–10.6)
CHLORIDE BLD-SCNC: 98 MMOL/L (ref 99–110)
CO2: 22 MMOL/L (ref 21–32)
CORTISOL TOTAL: 5 UG/DL
CREAT SERPL-MCNC: 0.9 MG/DL (ref 0.6–1.2)
CREATININE URINE: 40.5 MG/DL (ref 28–259)
EKG ATRIAL RATE: 47 BPM
EKG DIAGNOSIS: NORMAL
EKG P AXIS: 36 DEGREES
EKG P-R INTERVAL: 156 MS
EKG Q-T INTERVAL: 492 MS
EKG QRS DURATION: 88 MS
EKG QTC CALCULATION (BAZETT): 435 MS
EKG R AXIS: 25 DEGREES
EKG T AXIS: 65 DEGREES
EKG VENTRICULAR RATE: 47 BPM
GFR AFRICAN AMERICAN: >60
GFR NON-AFRICAN AMERICAN: >60
GLUCOSE BLD-MCNC: 88 MG/DL (ref 70–99)
HCT VFR BLD CALC: 36.1 % (ref 36–48)
HEMOGLOBIN: 12.4 G/DL (ref 12–16)
MCH RBC QN AUTO: 30.9 PG (ref 26–34)
MCHC RBC AUTO-ENTMCNC: 34.3 G/DL (ref 31–36)
MCV RBC AUTO: 90.1 FL (ref 80–100)
OSMOLALITY URINE: 206 MOSM/KG (ref 390–1070)
OSMOLALITY: 271 MOSM/KG (ref 280–301)
PDW BLD-RTO: 13.3 % (ref 12.4–15.4)
PLATELET # BLD: 204 K/UL (ref 135–450)
PMV BLD AUTO: 6.4 FL (ref 5–10.5)
POTASSIUM REFLEX MAGNESIUM: 4.2 MMOL/L (ref 3.5–5.1)
RBC # BLD: 4.01 M/UL (ref 4–5.2)
SODIUM BLD-SCNC: 127 MMOL/L (ref 136–145)
SODIUM BLD-SCNC: 129 MMOL/L (ref 136–145)
SODIUM BLD-SCNC: 131 MMOL/L (ref 136–145)
SODIUM BLD-SCNC: 133 MMOL/L (ref 136–145)
SODIUM URINE: <20 MMOL/L
TSH REFLEX: 4.13 UIU/ML (ref 0.27–4.2)
URIC ACID, SERUM: 5 MG/DL (ref 2.6–6)
WBC # BLD: 3.6 K/UL (ref 4–11)

## 2020-07-15 PROCEDURE — 83935 ASSAY OF URINE OSMOLALITY: CPT

## 2020-07-15 PROCEDURE — 84300 ASSAY OF URINE SODIUM: CPT

## 2020-07-15 PROCEDURE — 84295 ASSAY OF SERUM SODIUM: CPT

## 2020-07-15 PROCEDURE — APPNB30 APP NON BILLABLE TIME 0-30 MINS: Performed by: NURSE PRACTITIONER

## 2020-07-15 PROCEDURE — 82570 ASSAY OF URINE CREATININE: CPT

## 2020-07-15 PROCEDURE — 2060000000 HC ICU INTERMEDIATE R&B

## 2020-07-15 PROCEDURE — 2580000003 HC RX 258: Performed by: INTERNAL MEDICINE

## 2020-07-15 PROCEDURE — 85027 COMPLETE CBC AUTOMATED: CPT

## 2020-07-15 PROCEDURE — 36415 COLL VENOUS BLD VENIPUNCTURE: CPT

## 2020-07-15 PROCEDURE — 6370000000 HC RX 637 (ALT 250 FOR IP): Performed by: PHYSICIAN ASSISTANT

## 2020-07-15 PROCEDURE — APPSS60 APP SPLIT SHARED TIME 46-60 MINUTES: Performed by: NURSE PRACTITIONER

## 2020-07-15 PROCEDURE — 93010 ELECTROCARDIOGRAM REPORT: CPT | Performed by: INTERNAL MEDICINE

## 2020-07-15 PROCEDURE — 2580000003 HC RX 258: Performed by: PHYSICIAN ASSISTANT

## 2020-07-15 PROCEDURE — 6360000002 HC RX W HCPCS: Performed by: PHYSICIAN ASSISTANT

## 2020-07-15 PROCEDURE — 80048 BASIC METABOLIC PNL TOTAL CA: CPT

## 2020-07-15 RX ORDER — MIDODRINE HYDROCHLORIDE 5 MG/1
10 TABLET ORAL ONCE
Status: COMPLETED | OUTPATIENT
Start: 2020-07-15 | End: 2020-07-15

## 2020-07-15 RX ORDER — DEXTROSE MONOHYDRATE 50 MG/ML
INJECTION, SOLUTION INTRAVENOUS CONTINUOUS
Status: ACTIVE | OUTPATIENT
Start: 2020-07-15 | End: 2020-07-16

## 2020-07-15 RX ADMIN — ACETAMINOPHEN 650 MG: 325 TABLET, FILM COATED ORAL at 11:05

## 2020-07-15 RX ADMIN — ESCITALOPRAM OXALATE 10 MG: 10 TABLET ORAL at 11:05

## 2020-07-15 RX ADMIN — PANTOPRAZOLE SODIUM 40 MG: 40 TABLET, DELAYED RELEASE ORAL at 05:46

## 2020-07-15 RX ADMIN — PANTOPRAZOLE SODIUM 40 MG: 40 TABLET, DELAYED RELEASE ORAL at 01:07

## 2020-07-15 RX ADMIN — TIZANIDINE 4 MG: 4 TABLET ORAL at 15:25

## 2020-07-15 RX ADMIN — ASPIRIN 325 MG ORAL TABLET 325 MG: 325 PILL ORAL at 11:05

## 2020-07-15 RX ADMIN — TIZANIDINE 4 MG: 4 TABLET ORAL at 01:07

## 2020-07-15 RX ADMIN — ATORVASTATIN CALCIUM 10 MG: 10 TABLET, FILM COATED ORAL at 01:07

## 2020-07-15 RX ADMIN — MIDODRINE HYDROCHLORIDE 10 MG: 5 TABLET ORAL at 21:09

## 2020-07-15 RX ADMIN — DEXTROSE MONOHYDRATE: 50 INJECTION, SOLUTION INTRAVENOUS at 22:00

## 2020-07-15 RX ADMIN — ENOXAPARIN SODIUM 40 MG: 40 INJECTION SUBCUTANEOUS at 11:05

## 2020-07-15 RX ADMIN — FLUTICASONE PROPIONATE 1 SPRAY: 50 SPRAY, METERED NASAL at 11:05

## 2020-07-15 RX ADMIN — Medication 10 ML: at 11:05

## 2020-07-15 RX ADMIN — PANTOPRAZOLE SODIUM 40 MG: 40 TABLET, DELAYED RELEASE ORAL at 15:25

## 2020-07-15 RX ADMIN — CLOPIDOGREL 75 MG: 75 TABLET, FILM COATED ORAL at 11:05

## 2020-07-15 RX ADMIN — DEXTROSE MONOHYDRATE: 50 INJECTION, SOLUTION INTRAVENOUS at 13:35

## 2020-07-15 RX ADMIN — POTASSIUM CHLORIDE 40 MEQ: 1500 TABLET, EXTENDED RELEASE ORAL at 01:08

## 2020-07-15 RX ADMIN — TRAZODONE HYDROCHLORIDE 150 MG: 50 TABLET ORAL at 01:07

## 2020-07-15 RX ADMIN — TIZANIDINE 4 MG: 4 TABLET ORAL at 11:05

## 2020-07-15 RX ADMIN — ACETAMINOPHEN 650 MG: 325 TABLET, FILM COATED ORAL at 04:26

## 2020-07-15 RX ADMIN — ATORVASTATIN CALCIUM 10 MG: 10 TABLET, FILM COATED ORAL at 20:48

## 2020-07-15 ASSESSMENT — PAIN SCALES - GENERAL
PAINLEVEL_OUTOF10: 4
PAINLEVEL_OUTOF10: 0
PAINLEVEL_OUTOF10: 10
PAINLEVEL_OUTOF10: 0
PAINLEVEL_OUTOF10: 10
PAINLEVEL_OUTOF10: 0
PAINLEVEL_OUTOF10: 0

## 2020-07-15 ASSESSMENT — PAIN DESCRIPTION - DESCRIPTORS: DESCRIPTORS: POUNDING

## 2020-07-15 ASSESSMENT — PAIN DESCRIPTION - PAIN TYPE
TYPE: ACUTE PAIN
TYPE: ACUTE PAIN

## 2020-07-15 ASSESSMENT — PAIN DESCRIPTION - FREQUENCY: FREQUENCY: CONTINUOUS

## 2020-07-15 ASSESSMENT — PAIN DESCRIPTION - LOCATION
LOCATION: HEAD
LOCATION: HEAD

## 2020-07-15 ASSESSMENT — PAIN DESCRIPTION - ONSET: ONSET: ON-GOING

## 2020-07-15 NOTE — CARE COORDINATION
Discharge Planning Assessment     discharge planner met with patient to discuss reason for admission, current living situation, and potential needs at the time of discharge    Demographics/Insurance verified Yes/yes    Current type of dwelling: private home    Patient from ECF/ confirmed with: n/a    Living arrangements: lives in 2 story home with boyfriend, set up on first floor     Level of function/Support: independent     PCP: ROOSEVELT Lott NP    Last Visit to PCP: 6/2020    DME:  No reported     Active with any community resources/agencies/skilled home care: no    Medication compliance issues: no    Financial issues that could impact healthcare: no      Tentative discharge plan:  Discussed and provided facilities of choice if transition to a skilled nursing facility is required at the time of discharge      Discussed with patient and/or family that on the day of discharge home tentative time of discharge will be between 10 AM and noon.     Transportation at the time of discharge: [employer]    Iron Ray MSW, [address]

## 2020-07-15 NOTE — CONSULTS
Renal Consult [phone]  A/P  1. Hyponatremia- H/O Hyponatremia 6/2020 but improved to WNL. Transient ADH effect. Na 122 to 129. Etiology probably low Na intake + hypotension +Thiazide. Check Urine studies. Check TSH. Na every 4 hours to avoid overcorrection. 2.   Hypotension- hold bp meds  3    Renal function WNL  4    Severe Left ICA stenosis- correct Na prior to surgery  5    H/O Hep C  6. Hypokalemia- resolved. Would worsen Hyponatremia    Thank you.

## 2020-07-15 NOTE — CONSULTS
Peanut-Containing Drug Products Hives       Social History     Socioeconomic History    Marital status:      Spouse name: Not on file    Number of children: Not on file    Years of education: Not on file    Highest education level: Not on file   Occupational History    Not on file   Social Needs    Financial resource strain: Not on file    Food insecurity     Worry: Not on file     Inability: Not on file    Transportation needs     Medical: Not on file     Non-medical: Not on file   Tobacco Use    Smoking status: Current Every Day Smoker     Packs/day: 0.50     Types: Cigarettes    Smokeless tobacco: Never Used   Substance and Sexual Activity    Alcohol use: Yes     Frequency: 2-3 times a week     Comment: soc    Drug use: Never    Sexual activity: Not on file   Lifestyle    Physical activity     Days per week: Not on file     Minutes per session: Not on file    Stress: Not on file   Relationships    Social connections     Talks on phone: Not on file     Gets together: Not on file     Attends Yazdanism service: Not on file     Active member of club or organization: Not on file     Attends meetings of clubs or organizations: Not on file     Relationship status: Not on file    Intimate partner violence     Fear of current or ex partner: Not on file     Emotionally abused: Not on file     Physically abused: Not on file     Forced sexual activity: Not on file   Other Topics Concern    Not on file   Social History Narrative    Not on file       History reviewed.  No pertinent family history.  - No history of bleeding or clotting disorders    Vital Signs  Vitals:    07/15/20 0424 07/15/20 0821 07/15/20 1035 07/15/20 1130   BP: 99/65  (!) 86/55 (!) 91/58   Pulse: 51  60 56   Resp: 17  16    Temp: 98 °F (36.7 °C)  97.9 °F (36.6 °C)    TempSrc: Oral  Oral    SpO2: 97%  98%    Weight:  124 lb 11.2 oz (56.6 kg)     Height:           Physical Examination  General: no apparent distress, appears stated age  Psychiatric: affect appropriate  Head/Eyes/Ears/Nose/Throat:  Normocephalic, atraumatic, PERRLA, face symmetric  Neck:  no adenopathy, no carotid bruit, no JVD, supple, symmetrical, trachea midline, thyroid not enlarged, no tenderness/mass/nodules  Chest/Lungs: clear to auscultation bilaterally, no accessory muscle use, on room air   Cardiac:  regular rate and rhythm, S1, S2 normal, no murmur, click, rub or gallop  Abdomen: soft, nontender, active bowel sounds  Extremities: warm and well perfused, no signs of cyanosis or ischemia, no significant edema, bilateral upper and lower extremity motorsensory intact  Vascular exam:  - R radial: 2+  - L radial: 2+  - R femoral: 2+  - L femoral: 2+  - R DP: 2+  - L DP: 2+  - R PT: 2+  - L PT: 2+      Labs  Lab Results   Component Value Date    WBC 3.6 07/15/2020    HGB 12.4 07/15/2020    HCT 36.1 07/15/2020    MCV 90.1 07/15/2020     07/15/2020     Lab Results   Component Value Date     07/15/2020    K 4.2 07/15/2020    CL 98 07/15/2020    CO2 22 07/15/2020    BUN 19 07/15/2020    CREATININE 0.9 07/15/2020      No components found for: GLU    Scheduled Meds:    escitalopram  10 mg Oral Daily    fluticasone  1 spray Each Nostril Daily    pantoprazole  40 mg Oral BID AC    atorvastatin  10 mg Oral Nightly    tiZANidine  4 mg Oral TID    traZODone  150 mg Oral Nightly    sodium chloride flush  10 mL Intravenous 2 times per day    enoxaparin  40 mg Subcutaneous Daily    aspirin  325 mg Oral Daily    clopidogrel  75 mg Oral Daily     Imaging:   CT Head w/o contrast : 7/14/2020  FINDINGS:    BRAIN/VENTRICLES: There is no acute intracranial hemorrhage, mass effect or    midline shift.  No abnormal extra-axial fluid collection.  The gray-white    differentiation is maintained without evidence of an acute infarct.  There is    no evidence of hydrocephalus.         ORBITS: The visualized portion of the orbits demonstrate no acute abnormality.         SINUSES: The visualized paranasal sinuses and mastoid air cells demonstrate    no acute abnormality.         SOFT TISSUES/SKULL:  No acute abnormality of the visualized skull or soft    tissues. CTA head/neck with contrast: 2020  CTA HEAD:         ANTERIOR CIRCULATION: No significant stenosis of the intracranial internal    carotid, anterior cerebral, or middle cerebral arteries. No aneurysm.         POSTERIOR CIRCULATION: No significant stenosis of the vertebral, basilar, or    posterior cerebral arteries. No aneurysm.         OTHER: No dural venous sinus thrombosis on this non-dedicated study.         BRAIN: No mass effect or midline shift. No extra-axial fluid collection. The    gray-white differentiation is maintained.                EK2020:   Sinus kindra - heart rate 47    Assessment:  Symptomatic left ICA stenosis - 2 episodes of transient left eye visual loss  Hyponatremia- Na increased from 120 to 129  Hypertension, controlled  H/o Hepatitis C  Tobacco use    Plan:   Patient scheduled for left carotid endarterectomy on Monday () at 7:30 am with Dr. Mary Sanches. Continue aspirin and plavix and statin. Nephrology following and managing sodium correction. Will defer further medical management to medical team.  Smoking cessation. Will discuss with Dr. Didier Solomon and any further recommendations to follow. Thank you for the consultation. Patient educated on plan of care and disease process. All questions answered.         Electronically signed by ROOSEVELT Dorado CNP on 7/15/2020 at 11:48 AM

## 2020-07-15 NOTE — PROGRESS NOTES
[facility] PROGRESS NOTE    7/15/2020 10:05 PM        Name: Omar Graham . Admitted: 7/14/2020  Primary Care Provider: Aly George, ROOSEVELT-CNP, ROOSEVELT - NP (Tel: [phone])    Brief Course:  63 yo F with history of HTN, Hep C, known L ICA occlusion with TIA came to ER with HA. Admitted as inpatient for severe acute hyponatremia and hypotension. Followed by Renal and Vascular. HCTZ stopped. On IVF. CC:  Headache    Subjective:  . Patient denies CP, SOB, HA or fevers. No abdominal pain. Plans for L CEA on 7/20.     Reviewed interval ancillary notes    Current Medications  dextrose 5 % solution, Continuous  ALPRAZolam (XANAX) tablet 0.5 mg, Nightly PRN  escitalopram (LEXAPRO) tablet 10 mg, Daily  fluticasone (FLONASE) 50 MCG/ACT nasal spray 1 spray, Daily  pantoprazole (PROTONIX) tablet 40 mg, BID AC  atorvastatin (LIPITOR) tablet 10 mg, Nightly  tiZANidine (ZANAFLEX) tablet 4 mg, TID  traZODone (DESYREL) tablet 150 mg, Nightly  sodium chloride flush 0.9 % injection 10 mL, 2 times per day  sodium chloride flush 0.9 % injection 10 mL, PRN  acetaminophen (TYLENOL) tablet 650 mg, Q6H PRN    Or  acetaminophen (TYLENOL) suppository 650 mg, Q6H PRN  magnesium hydroxide (MILK OF MAGNESIA) 400 MG/5ML suspension 30 mL, Daily PRN  promethazine (PHENERGAN) tablet 12.5 mg, Q6H PRN    Or  ondansetron (ZOFRAN) injection 4 mg, Q6H PRN  enoxaparin (LOVENOX) injection 40 mg, Daily  aspirin tablet 325 mg, Daily  clopidogrel (PLAVIX) tablet 75 mg, Daily  potassium chloride (KLOR-CON M) extended release tablet 40 mEq, PRN    Or  potassium bicarb-citric acid (EFFER-K) effervescent tablet 40 mEq, PRN    Or  potassium chloride 10 mEq/100 mL IVPB (Peripheral Line), PRN        Objective:  /65   Pulse 65   Temp 98.2 °F (36.8 °C) (Oral)   Resp 16   Ht 5' 3\" (1.6 m)   Wt 124 lb 11.2 oz (56.6 kg)   SpO2 98% BMI 22.09 kg/m²     Intake/Output Summary (Last 24 hours) at 7/15/2020 2205  Last data filed at 7/15/2020 1945  Gross per 24 hour   Intake 480 ml   Output 175 ml   Net 305 ml      Wt Readings from Last 3 Encounters:   07/15/20 124 lb 11.2 oz (56.6 kg)   06/23/20 134 lb 9.6 oz (61.1 kg)       General appearance:  Appears comfortable, anxious  Eyes: Sclera clear. Pupils equal.  ENT: Moist oral mucosa. Trachea midline, no adenopathy. Cardiovascular: Regular rhythm, normal S1, S2. No murmur. No edema in lower extremities  Respiratory: Not using accessory muscles. Good inspiratory effort. Clear to auscultation bilaterally, no wheeze or crackles. GI: Abdomen soft, no tenderness, not distended, normal bowel sounds  Musculoskeletal: No cyanosis in digits, neck supple  Neurology: Grossly intact. No speech or motor deficits  Psych: Anxious affect. Alert and oriented in time, place and person  Skin: Warm, dry, normal turgor  Extremity exam shows brisk capillary refill. Peripheral pulses are palpable in lower extremities     Labs and Tests:  CBC:   Recent Labs     07/13/20  0810 07/14/20  1617 07/15/20  0508   WBC 9.6 5.6 3.6*   HGB 14.6 15.2 12.4    253 204     BMP:    Recent Labs     07/13/20  0810 07/14/20  1617 07/15/20  0508 07/15/20  1143 07/15/20  1524 07/15/20  1838   * 122* 129* 131* 133* 127*   K 3.9 3.4* 4.2  --   --   --    CL 81* 82* 98*  --   --   --    CO2 26 26 22  --   --   --    BUN 21* 18 19  --   --   --    CREATININE 0.8 0.8 0.9  --   --   --    GLUCOSE 98 92 88  --   --   --      Hepatic: No results for input(s): AST, ALT, ALB, BILITOT, ALKPHOS in the last 72 hours. CTA HEAD NECK W CONTRAST   Preliminary Result   Near-total occlusion of the proximal left internal carotid artery without   full distal lumen collapse. RECOMMENDATIONS:   Walter Quach. Carotid Near-Occlusion: A Comprehensive Review, Part   1--Definition, Terminology, and Diagnosis. AJNR Am J Neuroradiol.  2016 David;37(1):2-10. doi: 10.3174/ajnr. . Epub 2015 Aug 27. Review. PubMed   PMID: 18748175.   [employer].es. org/content/ajnr/early/2015/09/24/ajnr. .full. pdf         CT HEAD WO CONTRAST   Final Result   No acute intracranial abnormality. Problem List  Active Problems:    Hyponatremia    Hypokalemia    Internal carotid artery occlusion, left    Leukopenia    Hepatitis C    HTN (hypertension)  Resolved Problems:    * No resolved hospital problems. *       Assessment & Plan:   1. Follow serial sodium  2. Stop HCTZ upon DC  3. Cont ASA, Plavix and Zocor  4. Vascular input appreciated  5. Renal input appreciated  6. Keep off other BP meds today    IV Access: Peripheral  Galaviz: No  Diet: DIET GENERAL;  Code:Full Code  DVT PPX Lovenox  Disposition Home    Discussed with patient and nursing. Hopefully home in 24-48 hrs if sodium > 130. Can come back for elective L CEA.       Dell Mendiola MD   7/15/2020 10:05 PM

## 2020-07-15 NOTE — ED NOTES
Pt transported to floor via bed by [facility]. Pt transported on portable telemetry and with normal saline still infusing.      175 Avis Gottlieb RN  07/14/20 9175

## 2020-07-15 NOTE — CONSULTS
[address]                     [facility], [address]                                  CONSULTATION    PATIENT NAME: Brett Rudd                  :        1960  MED REC NO:   [id number]                          ROOM:       [id number]  ACCOUNT NO:   [de-identified]                           ADMIT DATE: 2020  PROVIDER:     Angel Calvo MD    RENAL CONSULTATION    CONSULT DATE:  07/15/2020    CONSULTING PHYSICIAN:  Angel Calvo MD    REFERRING NURSE PRACTITIONER:  Mary Ross. REASON FOR CONSULTATION:  Hyponatremia. HISTORY OF PRESENT ILLNESS:  The patient is a 68-year-old female with  history of hyponatremia noted from 2020 improved to 140 on ,  history of anxiety, osteoarthritis, hepatitis C, hyperlipidemia,  hypertension, GERD who presented to the hospital yesterday secondary to  persistent and worsening headache. She is known to have significant  left ICA stenosis with _____ arthrectomy scheduled for next Monday. She  was started on a low-salt diet about 6 months ago. She had outpatient  blood work drawn on  and she was found to be hyponatremic and she  was asked to go to the ER. She had some generalized weakness and  fatigue with no seizure episodes. Her outpatient medications does  include lisinopril HCTZ. So, reviewing sodium levels from , it was  120; on  it was 122 and early today it was 129. Normal saline has  been discontinued. She continues to have hypotensive episodes. She  denies any nausea, vomiting or diarrhea. No regular NSAID use. PAST MEDICAL HISTORY:  Includes hyponatremia, left ICA stenosis,  anxiety, osteoarthritis, hepatitis C, hyperlipidemia, hypertension,  reflux esophagitis. ALLERGIES:  Includes PENICILLIN/PEANUT.     MEDICATIONS PRIOR TO ADMISSION:  Includes lisinopril HCTZ,  acetaminophen, simvastatin, Os-Chip D, tizanidine, doxycycline  fluticasone, ofloxacin otic solution alprazolam, aspirin, amlodipine,  metoprolol XL, trazodone, escitalopram, pantoprazole, loratadine. SOCIAL HISTORY:  History of smoking. Uses alcohol occasionally. Denies  any drug abuse. FAMILY HISTORY:  Denies any family history of kidney disease or  hyponatremia. REVIEW OF SYSTEMS:  GENERAL:  Positive malaise. SKIN:  No skin rash, itching or discharge. NEUROLOGIC:  Positive headache. No focal deficits. No seizures. CARDIOVASCULAR:  No chest pain or palpitation. PULMONARY:  No shortness of breath. No coughing. No hemoptysis. GASTROINTESTINAL:  No abdominal pain. No nausea, no vomiting. No  diarrhea or constipation. RENAL:  Denies any gross hematuria or change in urine output. ENDOCRINE:  No history of diabetes. No heat or cold intolerance. INFECTIOUS DISEASES:  No fever or chills. MUSCULOSKELETAL:  Denies active joint pain. PHYSICAL EXAMINATION:  VITAL SIGNS:  Blood pressure 86/55, pulse 60, respirations 16,  temperature 97.9, saturating 98%. Weight listed at 56.6 kg. Urine  output not recorded. GENERAL:  Appears well. HEENT:  Anicteric sclerae, clear conjunctivae. SKIN:  Anicteric. No rash. CHEST:  Clear. HEART:  Regular. ABDOMEN:  Soft, nontender. No rebound or involuntary guarding. EXTREMITIES:  Show no edema. LABORATORY DATA:  Sodium 129, potassium 4.2, chloride 98, bicarb 22, BUN  19, creatinine 0.9, glucose 88, calcium 9.3. WBC 3.6, hemoglobin 12.4,  hematocrit 36.1, platelet count 575,404. Urinalysis, specific gravity  1.008, pH is 7, blood is trace, protein is negative. COVID testing from  07/13 is pending. DIAGNOSTIC DATA:  CT scan of the head without contrast shows no acute  intracranial abnormality. ASSESSMENT AND PLAN:  1. Hyponatremia, unclear duration. Sodium increased from 122 yesterday  to 129 today. No further IV fluid for now. Would try not to increase  serum sodium by more than 8 mEq over 24 hours or 16 mEq over 48 hours.    Etiology

## 2020-07-15 NOTE — ACP (ADVANCE CARE PLANNING)
Advanced Care Planning Note. Purpose of Encounter: Advanced care planning in light of L ICA occlusion  Parties In Attendance: Patient,  Oscar More RN  Decisional Capacity: Yes  Subjective: Patient denies CP, SOB, HA or abdominal pain  Objective: Cr 0.9  Goals of Care Determination: Patient wants full support (CPR, vent, surgery, HD, trach, PEG)  Plan:  IVF, Renal and Vascular consults  Code Status: Full code   Time spent on Advanced care Plannin minutes  Advanced Care Planning Documents: Completed advanced directives on chart, son is the POA.     Nancy Ng MD  7/15/2020 8:11 AM

## 2020-07-15 NOTE — ED NOTES
Pt report called to Chalino Baugh RN, states no questions or concerns at this time.       [facility], RN  07/14/20 9405

## 2020-07-15 NOTE — PROGRESS NOTES
Nephrology Consult received. Brief note full consult note to follow. HypoNatremia-?chronicity. Likely due to hypovolemia, hypotension. slight overcorrection. Stop NS for now. Recheck sodium and follow up urine studies. Thank you for allowing us to participate in this patients care. Please do not hesitate to contact me, if any questions/concerns. We will follow along with you. Brit Jackson MD  [facility] [address] [place] [address]   [phone] or [address] [place].

## 2020-07-16 ENCOUNTER — APPOINTMENT (OUTPATIENT)
Dept: MRI IMAGING | Age: 60
DRG: 629 | End: 2020-07-16
Payer: COMMERCIAL

## 2020-07-16 LAB
ANION GAP SERPL CALCULATED.3IONS-SCNC: 7 MMOL/L (ref 3–16)
BASOPHILS ABSOLUTE: 0 K/UL (ref 0–0.2)
BASOPHILS RELATIVE PERCENT: 0.9 %
BUN BLDV-MCNC: 12 MG/DL (ref 7–20)
CALCIUM SERPL-MCNC: 9.3 MG/DL (ref 8.3–10.6)
CHLORIDE BLD-SCNC: 99 MMOL/L (ref 99–110)
CO2: 24 MMOL/L (ref 21–32)
CREAT SERPL-MCNC: 0.8 MG/DL (ref 0.6–1.2)
EOSINOPHILS ABSOLUTE: 0.1 K/UL (ref 0–0.6)
EOSINOPHILS RELATIVE PERCENT: 2.8 %
GFR AFRICAN AMERICAN: >60
GFR NON-AFRICAN AMERICAN: >60
GLUCOSE BLD-MCNC: 113 MG/DL (ref 70–99)
HCT VFR BLD CALC: 34.7 % (ref 36–48)
HEMOGLOBIN: 11.8 G/DL (ref 12–16)
LYMPHOCYTES ABSOLUTE: 1.1 K/UL (ref 1–5.1)
LYMPHOCYTES RELATIVE PERCENT: 27.4 %
MAGNESIUM: 2 MG/DL (ref 1.8–2.4)
MCH RBC QN AUTO: 30.8 PG (ref 26–34)
MCHC RBC AUTO-ENTMCNC: 33.9 G/DL (ref 31–36)
MCV RBC AUTO: 90.8 FL (ref 80–100)
MONOCYTES ABSOLUTE: 0.4 K/UL (ref 0–1.3)
MONOCYTES RELATIVE PERCENT: 10.9 %
NEUTROPHILS ABSOLUTE: 2.3 K/UL (ref 1.7–7.7)
NEUTROPHILS RELATIVE PERCENT: 58 %
PDW BLD-RTO: 13.8 % (ref 12.4–15.4)
PLATELET # BLD: 213 K/UL (ref 135–450)
PMV BLD AUTO: 6.7 FL (ref 5–10.5)
POTASSIUM SERPL-SCNC: 4.2 MMOL/L (ref 3.5–5.1)
RBC # BLD: 3.83 M/UL (ref 4–5.2)
SARS-COV-2: NOT DETECTED
SEDIMENTATION RATE, ERYTHROCYTE: 2 MM/HR (ref 0–30)
SODIUM BLD-SCNC: 129 MMOL/L (ref 136–145)
SODIUM BLD-SCNC: 130 MMOL/L (ref 136–145)
SOURCE: NORMAL
WBC # BLD: 4 K/UL (ref 4–11)

## 2020-07-16 PROCEDURE — 99231 SBSQ HOSP IP/OBS SF/LOW 25: CPT | Performed by: SURGERY

## 2020-07-16 PROCEDURE — 99255 IP/OBS CONSLTJ NEW/EST HI 80: CPT | Performed by: PSYCHIATRY & NEUROLOGY

## 2020-07-16 PROCEDURE — 94760 N-INVAS EAR/PLS OXIMETRY 1: CPT

## 2020-07-16 PROCEDURE — 80048 BASIC METABOLIC PNL TOTAL CA: CPT

## 2020-07-16 PROCEDURE — 85652 RBC SED RATE AUTOMATED: CPT

## 2020-07-16 PROCEDURE — 85025 COMPLETE CBC W/AUTO DIFF WBC: CPT

## 2020-07-16 PROCEDURE — 86140 C-REACTIVE PROTEIN: CPT

## 2020-07-16 PROCEDURE — 70551 MRI BRAIN STEM W/O DYE: CPT

## 2020-07-16 PROCEDURE — 6370000000 HC RX 637 (ALT 250 FOR IP): Performed by: INTERNAL MEDICINE

## 2020-07-16 PROCEDURE — 83735 ASSAY OF MAGNESIUM: CPT

## 2020-07-16 PROCEDURE — 99254 IP/OBS CNSLTJ NEW/EST MOD 60: CPT | Performed by: INTERNAL MEDICINE

## 2020-07-16 PROCEDURE — 6360000002 HC RX W HCPCS: Performed by: INTERNAL MEDICINE

## 2020-07-16 PROCEDURE — 2580000003 HC RX 258: Performed by: PHYSICIAN ASSISTANT

## 2020-07-16 PROCEDURE — 6360000002 HC RX W HCPCS: Performed by: PHYSICIAN ASSISTANT

## 2020-07-16 PROCEDURE — 36415 COLL VENOUS BLD VENIPUNCTURE: CPT

## 2020-07-16 PROCEDURE — 84295 ASSAY OF SERUM SODIUM: CPT

## 2020-07-16 PROCEDURE — 2060000000 HC ICU INTERMEDIATE R&B

## 2020-07-16 PROCEDURE — 6370000000 HC RX 637 (ALT 250 FOR IP): Performed by: PHYSICIAN ASSISTANT

## 2020-07-16 RX ORDER — MIDODRINE HYDROCHLORIDE 5 MG/1
2.5 TABLET ORAL
Status: DISCONTINUED | OUTPATIENT
Start: 2020-07-16 | End: 2020-07-17

## 2020-07-16 RX ORDER — KETOROLAC TROMETHAMINE 30 MG/ML
30 INJECTION, SOLUTION INTRAMUSCULAR; INTRAVENOUS EVERY 6 HOURS PRN
Status: COMPLETED | OUTPATIENT
Start: 2020-07-16 | End: 2020-07-19

## 2020-07-16 RX ADMIN — TIZANIDINE 4 MG: 4 TABLET ORAL at 09:20

## 2020-07-16 RX ADMIN — TRAZODONE HYDROCHLORIDE 150 MG: 50 TABLET ORAL at 20:28

## 2020-07-16 RX ADMIN — Medication 10 ML: at 17:55

## 2020-07-16 RX ADMIN — ENOXAPARIN SODIUM 40 MG: 40 INJECTION SUBCUTANEOUS at 09:20

## 2020-07-16 RX ADMIN — Medication 10 ML: at 20:28

## 2020-07-16 RX ADMIN — ATORVASTATIN CALCIUM 10 MG: 10 TABLET, FILM COATED ORAL at 20:28

## 2020-07-16 RX ADMIN — ASPIRIN 325 MG ORAL TABLET 325 MG: 325 PILL ORAL at 09:20

## 2020-07-16 RX ADMIN — MIDODRINE HYDROCHLORIDE 2.5 MG: 5 TABLET ORAL at 13:35

## 2020-07-16 RX ADMIN — TIZANIDINE 4 MG: 4 TABLET ORAL at 20:28

## 2020-07-16 RX ADMIN — TIZANIDINE 4 MG: 4 TABLET ORAL at 13:35

## 2020-07-16 RX ADMIN — PANTOPRAZOLE SODIUM 40 MG: 40 TABLET, DELAYED RELEASE ORAL at 05:30

## 2020-07-16 RX ADMIN — PANTOPRAZOLE SODIUM 40 MG: 40 TABLET, DELAYED RELEASE ORAL at 17:55

## 2020-07-16 RX ADMIN — ESCITALOPRAM OXALATE 10 MG: 10 TABLET ORAL at 09:20

## 2020-07-16 RX ADMIN — KETOROLAC TROMETHAMINE 30 MG: 30 INJECTION, SOLUTION INTRAMUSCULAR at 09:20

## 2020-07-16 RX ADMIN — FLUTICASONE PROPIONATE 1 SPRAY: 50 SPRAY, METERED NASAL at 09:20

## 2020-07-16 RX ADMIN — KETOROLAC TROMETHAMINE 30 MG: 30 INJECTION, SOLUTION INTRAMUSCULAR at 17:55

## 2020-07-16 RX ADMIN — CLOPIDOGREL 75 MG: 75 TABLET, FILM COATED ORAL at 09:20

## 2020-07-16 RX ADMIN — MIDODRINE HYDROCHLORIDE 2.5 MG: 5 TABLET ORAL at 17:55

## 2020-07-16 RX ADMIN — Medication 10 ML: at 09:20

## 2020-07-16 ASSESSMENT — PAIN DESCRIPTION - LOCATION
LOCATION: HEAD

## 2020-07-16 ASSESSMENT — PAIN SCALES - GENERAL
PAINLEVEL_OUTOF10: 10

## 2020-07-16 ASSESSMENT — ENCOUNTER SYMPTOMS
VOMITING: 0
ABDOMINAL PAIN: 0
PHOTOPHOBIA: 0
DIARRHEA: 0
BLOOD IN STOOL: 0
FACIAL SWELLING: 0
NAUSEA: 0
CONSTIPATION: 0
EYE REDNESS: 0
CHEST TIGHTNESS: 0
EYE DISCHARGE: 0
COUGH: 0
ABDOMINAL DISTENTION: 0
SHORTNESS OF BREATH: 0

## 2020-07-16 ASSESSMENT — PAIN DESCRIPTION - PAIN TYPE
TYPE: ACUTE PAIN

## 2020-07-16 NOTE — PLAN OF CARE
Problem: Pain:  Goal: Pain level will decrease  Description: Pain level will decrease  Outcome: Ongoing  Note: Patient with c/o headache 10/10. PRN Toradol administered per orders - see MAR. Will continue to monitor. Problem: Falls - Risk of:  Goal: Will remain free from falls  Description: Will remain free from falls  Outcome: Ongoing  Note: Patient remains absent from falls at this time. Remains alert and oriented, in bed with call light and belongings in reach. Non-slip footwear on and 2/4 siderails raised. Bed remains in lowest/locked position at all times. Fall precautions in place. Patient encouraged to use call light to request assistance, v/u.  Will continue to monitor.

## 2020-07-16 NOTE — PROGRESS NOTES
[facility] PROGRESS NOTE    7/16/2020 12:46 PM        Name: Maxine Johns . Admitted: 7/14/2020  Primary Care Provider: EMILIANA Schneider APRN - NP (Tel: [phone])    Brief Course:  61 yo F with history of HTN, Hep C, known L ICA occlusion with TIA came to ER with HA. Admitted as inpatient for severe acute hyponatremia and hypotension. Followed by Renal and Vascular. HCTZ stopped. On IVF. Remains hypotensive despite being off 4 BP meds. MRI Brain ordered for ongoing HA. Neuro consulted for HA. Cardiology consulted for pre op eval for L CEA. Echo ordered. CC:  Headache    Subjective:  . Patient with ongoing HA. Patient denies CP, SOB or fevers. No abdominal pain. Patient agrees to stay in hospital and perform L CEA as inpatient given TIA symptoms at home.     Reviewed interval ancillary notes    Current Medications  perflutren lipid microspheres (DEFINITY) injection 1.65 mg, ONCE PRN  ketorolac (TORADOL) injection 30 mg, Q6H PRN  midodrine (PROAMATINE) tablet 2.5 mg, TID WC  ALPRAZolam (XANAX) tablet 0.5 mg, Nightly PRN  escitalopram (LEXAPRO) tablet 10 mg, Daily  fluticasone (FLONASE) 50 MCG/ACT nasal spray 1 spray, Daily  pantoprazole (PROTONIX) tablet 40 mg, BID AC  atorvastatin (LIPITOR) tablet 10 mg, Nightly  tiZANidine (ZANAFLEX) tablet 4 mg, TID  traZODone (DESYREL) tablet 150 mg, Nightly  sodium chloride flush 0.9 % injection 10 mL, 2 times per day  sodium chloride flush 0.9 % injection 10 mL, PRN  acetaminophen (TYLENOL) tablet 650 mg, Q6H PRN    Or  acetaminophen (TYLENOL) suppository 650 mg, Q6H PRN  magnesium hydroxide (MILK OF MAGNESIA) 400 MG/5ML suspension 30 mL, Daily PRN  promethazine (PHENERGAN) tablet 12.5 mg, Q6H PRN    Or  ondansetron (ZOFRAN) injection 4 mg, Q6H PRN  enoxaparin (LOVENOX) injection 40 mg, Daily  aspirin tablet 325 mg, Daily  clopidogrel (PLAVIX) tablet 75 mg, Daily  potassium chloride (KLOR-CON M) extended release tablet 40 mEq, PRN    Or  potassium bicarb-citric acid (EFFER-K) effervescent tablet 40 mEq, PRN    Or  potassium chloride 10 mEq/100 mL IVPB (Peripheral Line), PRN        Objective:  /69   Pulse 56   Temp 97.9 °F (36.6 °C) (Oral)   Resp 16   Ht 5' 3\" (1.6 m)   Wt 124 lb 8 oz (56.5 kg)   SpO2 99%   BMI 22.05 kg/m²     Intake/Output Summary (Last 24 hours) at 7/16/2020 1246  Last data filed at 7/16/2020 0601  Gross per 24 hour   Intake 360 ml   Output 1925 ml   Net -1565 ml      Wt Readings from Last 3 Encounters:   07/16/20 124 lb 8 oz (56.5 kg)   06/23/20 134 lb 9.6 oz (61.1 kg)       General appearance:  Appears comfortable, anxious  Eyes: Sclera clear. Pupils equal.  ENT: Moist oral mucosa. Trachea midline, no adenopathy. Cardiovascular: Regular rhythm, normal S1, S2. No murmur. No edema in lower extremities  Respiratory: Not using accessory muscles. Good inspiratory effort. Clear to auscultation bilaterally, no wheeze or crackles. GI: Abdomen soft, no tenderness, not distended, normal bowel sounds  Musculoskeletal: No cyanosis in digits, neck supple  Neurology: Grossly intact. No speech or motor deficits  Psych: Anxious affect. Alert and oriented in time, place and person  Skin: Warm, dry, normal turgor  Extremity exam shows brisk capillary refill.   Peripheral pulses are palpable in lower extremities     Labs and Tests:  CBC:   Recent Labs     07/14/20  1617 07/15/20  0508 07/16/20  0611   WBC 5.6 3.6* 4.0   HGB 15.2 12.4 11.8*    204 213     BMP:    Recent Labs     07/14/20  1617 07/15/20  0508  07/15/20  1838 07/16/20  0156 07/16/20  0611   * 129*   < > 127* 129* 130*   K 3.4* 4.2  --   --   --  4.2   CL 82* 98*  --   --   --  99   CO2 26 22  --   --   --  24   BUN 18 19  --   --   --  12   CREATININE 0.8 0.9  --   --   --  0.8   GLUCOSE 92 88  --   --   --  113*    < > = values in this interval not displayed. Hepatic: No results for input(s): AST, ALT, ALB, BILITOT, ALKPHOS in the last 72 hours. CTA HEAD NECK W CONTRAST   Preliminary Result   Near-total occlusion of the proximal left internal carotid artery without   full distal lumen collapse. RECOMMENDATIONS:   Edith Lee. Carotid Near-Occlusion: A Comprehensive Review, Part   1--Definition, Terminology, and Diagnosis. AJNR Am J Neuroradiol. 2016   Jan;37(1):2-10. doi: 10.3174/ajnr. . Epub 2015 Aug 27. Review. PubMed   PMID: [id number].   [employer].. org/content/ajnr/early/2015/09/24/ajnr. .full. pdf         CT HEAD WO CONTRAST   Final Result   No acute intracranial abnormality. MRI BRAIN WO CONTRAST    (Results Pending)         Problem List  Active Problems:    Hyponatremia    Hypokalemia    Internal carotid artery occlusion, left    Leukopenia    Hepatitis C    HTN (hypertension)  Resolved Problems:    * No resolved hospital problems. *       Assessment & Plan:   1. Check MRI Brain given HA  2. Neuro consult for HA, ? stroke  3. Cont ASA, Plavix and Zocor  4. Vascular input appreciated  5. Renal input appreciated  6. Start Midodrine  7. Check Echo given TIA  8. Cardio consult for pre op eval for L CEA on Monday  9. She might be best with Hep gtt if stroke noted on MRI Brain    IV Access: Peripheral  Galaviz: No  Diet: Diet NPO Effective Now  Code:Full Code  DVT PPX Lovenox  Disposition Home    Discussed with patient, Dr Lyndon Betancourt (Renal), Avani Rivera (Vasc NP), CM and nursing. Given marginal BP and fluctuating neuro symptoms, she is best to proceed with L CEA as inpatient on 7/20.         Alpesh Pearce MD   7/16/2020 12:46 PM

## 2020-07-16 NOTE — PROGRESS NOTES
[facility] Note    Patient Active Problem List   Diagnosis    Hyponatremia    Hypokalemia    Internal carotid artery occlusion, left    Leukopenia    Hepatitis C    HTN (hypertension)       Past Medical History:   has a past medical history of Anxiety, Arthritis, Hepatitis C, Hyperlipidemia, Hypertension, and Reflux esophagitis. Past Social History:   reports that she has been smoking cigarettes. She has been smoking about 0.50 packs per day. She has never used smokeless tobacco. She reports current alcohol use. She reports that she does not use drugs. Subjective:  No complaints except for Headache    Review of Systems   Constitutional: Negative for activity change, appetite change, chills, fatigue, fever and unexpected weight change. HENT: Negative for congestion and facial swelling. Eyes: Negative for photophobia, discharge and redness. Respiratory: Negative for cough, chest tightness and shortness of breath. Cardiovascular: Negative for chest pain, palpitations and leg swelling. Gastrointestinal: Negative for abdominal distention, abdominal pain, blood in stool, constipation, diarrhea, nausea and vomiting. Endocrine: Negative for cold intolerance, heat intolerance and polyuria. Genitourinary: Negative for decreased urine volume, difficulty urinating, flank pain and hematuria. Musculoskeletal: Negative for joint swelling and neck pain. Neurological: Positive for headaches. Negative for dizziness, seizures, syncope, speech difficulty and light-headedness. Hematological: Does not bruise/bleed easily. Psychiatric/Behavioral: Negative for agitation, confusion and hallucinations.        Objective:      /69   Pulse 56   Temp 97.9 °F (36.6 °C) (Oral)   Resp 16   Ht 5' 3\" (1.6 m)   Wt 124 lb 8 oz (56.5 kg)   SpO2 99%   BMI 22.05 kg/m²     Wt Readings from Last 3 Encounters:   07/16/20 124 lb 8 oz (56.5 kg) 06/23/20 134 lb 9.6 oz (61.1 kg)       BP Readings from Last 3 Encounters:   07/16/20 105/69   06/23/20 90/68     Chest- clear  Heart-regular  Abd-soft  Ext- no edema    Labs  Hemoglobin   Date Value Ref Range Status   07/16/2020 11.8 (L) 12.0 - 16.0 g/dL Final     Hematocrit   Date Value Ref Range Status   07/16/2020 34.7 (L) 36.0 - 48.0 % Final     WBC   Date Value Ref Range Status   07/16/2020 4.0 4.0 - 11.0 K/uL Final     Platelets   Date Value Ref Range Status   07/16/2020 213 135 - 450 K/uL Final     Lab Results   Component Value Date    CREATININE 0.8 07/16/2020    BUN 12 07/16/2020     (L) 07/16/2020    K 4.2 07/16/2020    CL 99 07/16/2020    CO2 24 07/16/2020   UOsm 206  Saranya less than 20  TSH WNL    Assessment/Plan:  1. Hyponatremia, unclear duration. Sodium increased from 122 to 129 today. Needed D5W to slow down correction. Etiology probably secondary to decreased salt intake plus hypotension plus HCTZ. Rate of correction now adequate. 2.  Hypotension. Hold off on antihypertensives. She should avoid  thiazides. 3.  Renal function is within normal limits. 4.  History of severe left ICA stenosis- Vascular surgery following. 5.  History of hepatitis C. Normal renal function. UA only showed trace blood  6. Currently on MIdodrine  7.    Headache- w/u per Medicine    Anna Odell MD

## 2020-07-16 NOTE — CONSULTS
tobacco: Never Used   Substance and Sexual Activity    Alcohol use: Yes     Frequency: 2-3 times a week     Comment: soc    Drug use: Never    Sexual activity: None   Lifestyle    Physical activity     Days per week: None     Minutes per session: None    Stress: None   Relationships    Social connections     Talks on phone: None     Gets together: None     Attends Yazidism service: None     Active member of club or organization: None     Attends meetings of clubs or organizations: None     Relationship status: None    Intimate partner violence     Fear of current or ex partner: None     Emotionally abused: None     Physically abused: None     Forced sexual activity: None   Other Topics Concern    None   Social History Narrative    None     Current Facility-Administered Medications   Medication Dose Route Frequency Provider Last Rate Last Dose    perflutren lipid microspheres (DEFINITY) injection 1.65 mg  1.5 mL Intravenous ONCE PRN Anish Burton MD        ketorolac (TORADOL) injection 30 mg  30 mg Intravenous Q6H PRN Anish Burton MD   30 mg at 07/16/20 0920    midodrine (PROAMATINE) tablet 2.5 mg  2.5 mg Oral TID WC Anish Burton MD   2.5 mg at 07/16/20 1335    ALPRAZolam (XANAX) tablet 0.5 mg  0.5 mg Oral Nightly PRN Catrachita Lizarraga PA-C        escitalopram (LEXAPRO) tablet 10 mg  10 mg Oral Daily Catrachita Lizarraga PA-C   10 mg at 07/16/20 0920    fluticasone (FLONASE) 50 MCG/ACT nasal spray 1 spray  1 spray Each Nostril Daily Catrachita Lizarraga PA-C   1 spray at 07/16/20 0920    pantoprazole (PROTONIX) tablet 40 mg  40 mg Oral BID AC Filomena Marley PA-C   40 mg at 07/16/20 0530    atorvastatin (LIPITOR) tablet 10 mg  10 mg Oral Nightly Catrachita Lizarraga PA-C   10 mg at 07/15/20 2048    tiZANidine (ZANAFLEX) tablet 4 mg  4 mg Oral TID Catrachita Lizarraga PA-C   4 mg at 07/16/20 1335    traZODone (DESYREL) tablet 150 mg  150 mg Oral Nightly Catrachita VISHNU Lizarraga   150 mg at 07/15/20 0107  sodium chloride flush 0.9 % injection 10 mL  10 mL Intravenous 2 times per day Bassam Colon PA-C   10 mL at 07/16/20 0920    sodium chloride flush 0.9 % injection 10 mL  10 mL Intravenous PRN ED Carrero-C        acetaminophen (TYLENOL) tablet 650 mg  650 mg Oral Q6H PRN Bassam Colon PA-C   650 mg at 07/15/20 1105    Or    acetaminophen (TYLENOL) suppository 650 mg  650 mg Rectal Q6H PRN ED Carrero-C        magnesium hydroxide (MILK OF MAGNESIA) 400 MG/5ML suspension 30 mL  30 mL Oral Daily PRN Bassam Colon PA-C        promethazine (PHENERGAN) tablet 12.5 mg  12.5 mg Oral Q6H PRN KRYSTYNA CarreroC        Or    ondansetron [facility]) injection 4 mg  4 mg Intravenous Q6H PRN Bassam Colon PA-C        enoxaparin (LOVENOX) injection 40 mg  40 mg Subcutaneous Daily ED Carrero-C   40 mg at 07/16/20 0920    aspirin tablet 325 mg  325 mg Oral Daily Bassam Colon PA-C   325 mg at 07/16/20 0920    clopidogrel (PLAVIX) tablet 75 mg  75 mg Oral Daily ED Carrero-C   75 mg at 07/16/20 0920    potassium chloride (KLOR-CON M) extended release tablet 40 mEq  40 mEq Oral PRN Bassam Colon PA-C   40 mEq at 07/15/20 0110    Or    potassium bicarb-citric acid (EFFER-K) effervescent tablet 40 mEq  40 mEq Oral PRN ED Carrero-C        Or    potassium chloride 10 mEq/100 mL IVPB (Peripheral Line)  10 mEq Intravenous PRN Bassam Colon PA-C         Allergies   Allergen Reactions    Penicillins Nausea And Vomiting and Hives    Nut  [Macadamia Nut Oil] Hives    Peanut (Diagnostic) Hives and Swelling    Peanut-Containing Drug Products Hives       PHYSICAL EXAMINATION:  /72   Pulse 50   Temp 98.3 °F (36.8 °C) (Oral)   Resp 16   Ht 5' 3\" (1.6 m)   Wt 124 lb 8 oz (56.5 kg)   SpO2 96%   BMI 22.05 kg/m²   Appearance: Well appearing, well nourished and in no distress  Mental Status Exam: Patient is alert, oriented to person, probably nonspecific and could be muscle contraction headaches. However temporal arteritis will be considered and ruled out. Smoker  Patient Active Problem List   Diagnosis    Hyponatremia    Hypokalemia    Internal carotid artery occlusion, left    Leukopenia    Hepatitis C    HTN (hypertension)     RECOMMENDATIONS ;  Discussed with patient  Await MRI brain  Check sedimentation rate and C-reactive protein levels  Symptomatic treatment for headaches at this time with analgesic medications  Agree with plans for left carotid endarterectomy  Strongly urged her to quit smoking  Thank you for this consultation. Please note a portion of this chart was generated using dragon dictation software. Although every effort was made to ensure the accuracy of this automated transcription, some errors in transcription may have occurred.

## 2020-07-16 NOTE — CONSULTS
Cardiovascular Consultation     Attending Physician: Dell Mendiola MD    PATIENT: Omar Graham  : 1960  MRN: [id number]    Reason for Consultation:   Chief Complaint   Patient presents with    Headache     headache 1 week, carotid artery clogged, sx next monday for carotid. was sent her for an admission. History of present illness:   Ms. Omar Graham is a 62 y.o. female patient with PMH of hypertension/hyperlipidemia presented with worsening headaches for one week. She was scheduled as an outpatient for Left CEA with Dr. Lucretia Abbott for next week following workup for transient visual changes new in onset last month. Read Manger denies any cardiac specific history. She had a normal stress test and echocardiogram at outside health system in recent years and is uncertain why these were done. She denies history of invasive cardiac procedures. She denies family history of cardiovascular disease. Reports 0.5 PPD for 30 years until admission. Compliant with medicaitons. Denies chest pain,palpitations, lightheadedness and is without shortness of breath, PND, orthopnea, or LE edema. Medical History:      Diagnosis Date    Anxiety     Arthritis     Hepatitis C     Hyperlipidemia     Hypertension     Reflux esophagitis        Surgical History:      Procedure Laterality Date    FRACTURE SURGERY      left arm    JOINT REPLACEMENT      knee       Social History:  Social History     Socioeconomic History    Marital status:       Spouse name: Not on file    Number of children: Not on file    Years of education: Not on file    Highest education level: Not on file   Occupational History    Not on file   Social Needs    Financial resource strain: Not on file    Food insecurity     Worry: Not on file     Inability: Not on file    Transportation needs     Medical: Not on file     Non-medical: Not on file   Tobacco Use    Smoking status: Current Every Day Smoker Packs/day: 0.50     Types: Cigarettes    Smokeless tobacco: Never Used   Substance and Sexual Activity    Alcohol use: Yes     Frequency: 2-3 times a week     Comment: soc    Drug use: Never    Sexual activity: Not on file   Lifestyle    Physical activity     Days per week: Not on file     Minutes per session: Not on file    Stress: Not on file   Relationships    Social connections     Talks on phone: Not on file     Gets together: Not on file     Attends Sabianism service: Not on file     Active member of club or organization: Not on file     Attends meetings of clubs or organizations: Not on file     Relationship status: Not on file    Intimate partner violence     Fear of current or ex partner: Not on file     Emotionally abused: Not on file     Physically abused: Not on file     Forced sexual activity: Not on file   Other Topics Concern    Not on file   Social History Narrative    Not on file        Family History:  No evidence for sudden cardiac death or premature CAD. History reviewed. No pertinent family history.     Medications:  perflutren lipid microspheres (DEFINITY) injection 1.65 mg, ONCE PRN  ketorolac (TORADOL) injection 30 mg, Q6H PRN  midodrine (PROAMATINE) tablet 2.5 mg, TID WC  ALPRAZolam (XANAX) tablet 0.5 mg, Nightly PRN  escitalopram (LEXAPRO) tablet 10 mg, Daily  fluticasone (FLONASE) 50 MCG/ACT nasal spray 1 spray, Daily  pantoprazole (PROTONIX) tablet 40 mg, BID AC  atorvastatin (LIPITOR) tablet 10 mg, Nightly  tiZANidine (ZANAFLEX) tablet 4 mg, TID  traZODone (DESYREL) tablet 150 mg, Nightly  sodium chloride flush 0.9 % injection 10 mL, 2 times per day  sodium chloride flush 0.9 % injection 10 mL, PRN  acetaminophen (TYLENOL) tablet 650 mg, Q6H PRN    Or  acetaminophen (TYLENOL) suppository 650 mg, Q6H PRN  magnesium hydroxide (MILK OF MAGNESIA) 400 MG/5ML suspension 30 mL, Daily PRN  promethazine (PHENERGAN) tablet 12.5 mg, Q6H PRN    Or  ondansetron (ZOFRAN) injection 4 mg, Q6H PRN  enoxaparin (LOVENOX) injection 40 mg, Daily  aspirin tablet 325 mg, Daily  clopidogrel (PLAVIX) tablet 75 mg, Daily  potassium chloride (KLOR-CON M) extended release tablet 40 mEq, PRN    Or  potassium bicarb-citric acid (EFFER-K) effervescent tablet 40 mEq, PRN    Or  potassium chloride 10 mEq/100 mL IVPB (Peripheral Line), PRN        Allergies:  Penicillins; Nut  [macadamia nut oil];  Peanut (diagnostic); and Peanut-containing drug products     Review of Systems:   [x]Full ROS obtained and negative except as mentioned in HPI    Physical Examination:    /72   Pulse 50   Temp 98.3 °F (36.8 °C) (Oral)   Resp 16   Ht 5' 3\" (1.6 m)   Wt 124 lb 8 oz (56.5 kg)   SpO2 96%   BMI 22.05 kg/m²   Wt Readings from Last 3 Encounters:   20 124 lb 8 oz (56.5 kg)   20 134 lb 9.6 oz (61.1 kg)       GENERAL: Well developed, well nourished, no acute distress  NEUROLOGICAL: Alert and oriented x3  PSYCH: Normal mood and affect   SKIN: Warm and dry, without lesions  HEENT: Normocephalic, atraumatic, Sclera non-icteric, mucous membranes moist  NECK: supple, JVP normal, thyroid not enlarged   CARDIAC: Normal PMI, regular rate and rhythm, normal S1S2, no murmur, rub  RESPIRATORY: Normal respiratory effort, clear to auscultation bilaterally  EXTREMITIES: No cyanosis, clubbing or edema, palpable pulses bilaterally   GASTROINTESTINAL:  soft, non-tender, no bruit    Labs:  Lab Review   Lab Results   Component Value Date     2020    K 4.2 2020    K 4.2 07/15/2020    CL 99 2020    CO2 24 2020    BUN 12 2020    CREATININE 0.8 2020    GLUCOSE 113 2020    CALCIUM 9.3 2020       Lab Results   Component Value Date    WBC 4.0 2020    HGB 11.8 2020    HCT 34.7 2020    MCV 90.8 2020     2020     Imaging:  I have reviewed the below testing personally:    EK/13/20   Sinus bradycardia  Otherwise normal ECG    20  Marked sinus vascular, or peripheral vascular, etc.)  Revised Cardiac Risk Index Risk factors: History of cerebrovascular disease  Measurement of Exercise Tolerance before Surgery >4 Yes    According to the 2014 ACC/AHA pre-operative risk assessment guidelines Desiree Lame is a low risk for major cardiac complications during a high risk procedure and may continue as planned. Specific medication recommendations are listed below. Medications recommended to continue should be taken with a sip of water even when NPO. Further recommendations from consultants: None    Medication Recommendations:  Betablocker should be continued the day of surgery   Statins should be continued the day of surgery  ASA should be continued the day of surgery  non ASA antiplatelets agents Clopidogrel, Ticagrelor, Prasugrel, or Ticlopidine should be continued uninterrupted perioperatively    Echo and stress test from outside health system reviewed. No further preoperative cardiac testing recommended. Thank you for allowing me to participate in the care of your patient. Please do not hesitate to call. Gutierrez Sharp DO, [facility]  Interventional Cardiology     o: [phone]  [address], [address]        NOTE:  This report was transcribed using voice recognition software. Every effort was made to ensure accuracy; however, inadvertent computerized transcription errors may be present.

## 2020-07-16 NOTE — PROGRESS NOTES
VASCULAR    See consult from yesterday. Planned L CEA for symptomatic severe stenosis this coming Monday per Dr. Franky Minor. No recurrent ocular events. Electrolytes improving. Pt should keep scheduled surgery appointment. OK for DC home today or tomorrow with return Monday AM at 0530 for surgery. Will see as needed. Please call for any further questions or problems.     Liliana Nunez

## 2020-07-17 LAB
ANION GAP SERPL CALCULATED.3IONS-SCNC: 9 MMOL/L (ref 3–16)
BASOPHILS ABSOLUTE: 0 K/UL (ref 0–0.2)
BASOPHILS RELATIVE PERCENT: 0.8 %
BUN BLDV-MCNC: 13 MG/DL (ref 7–20)
C-REACTIVE PROTEIN: 0.9 MG/L (ref 0–5.1)
CALCIUM SERPL-MCNC: 9.4 MG/DL (ref 8.3–10.6)
CHLORIDE BLD-SCNC: 99 MMOL/L (ref 99–110)
CO2: 22 MMOL/L (ref 21–32)
CREAT SERPL-MCNC: 0.9 MG/DL (ref 0.6–1.2)
EOSINOPHILS ABSOLUTE: 0.1 K/UL (ref 0–0.6)
EOSINOPHILS RELATIVE PERCENT: 2.7 %
GFR AFRICAN AMERICAN: >60
GFR NON-AFRICAN AMERICAN: >60
GLUCOSE BLD-MCNC: 93 MG/DL (ref 70–99)
HCT VFR BLD CALC: 35.4 % (ref 36–48)
HEMOGLOBIN: 11.9 G/DL (ref 12–16)
LYMPHOCYTES ABSOLUTE: 1.4 K/UL (ref 1–5.1)
LYMPHOCYTES RELATIVE PERCENT: 33.9 %
MAGNESIUM: 1.9 MG/DL (ref 1.8–2.4)
MCH RBC QN AUTO: 30.6 PG (ref 26–34)
MCHC RBC AUTO-ENTMCNC: 33.6 G/DL (ref 31–36)
MCV RBC AUTO: 91.2 FL (ref 80–100)
MONOCYTES ABSOLUTE: 0.4 K/UL (ref 0–1.3)
MONOCYTES RELATIVE PERCENT: 10.1 %
NEUTROPHILS ABSOLUTE: 2.2 K/UL (ref 1.7–7.7)
NEUTROPHILS RELATIVE PERCENT: 52.5 %
PDW BLD-RTO: 13.9 % (ref 12.4–15.4)
PLATELET # BLD: 196 K/UL (ref 135–450)
PMV BLD AUTO: 6.4 FL (ref 5–10.5)
POTASSIUM SERPL-SCNC: 4 MMOL/L (ref 3.5–5.1)
RBC # BLD: 3.88 M/UL (ref 4–5.2)
SODIUM BLD-SCNC: 130 MMOL/L (ref 136–145)
WBC # BLD: 4.2 K/UL (ref 4–11)

## 2020-07-17 PROCEDURE — 2060000000 HC ICU INTERMEDIATE R&B

## 2020-07-17 PROCEDURE — 85025 COMPLETE CBC W/AUTO DIFF WBC: CPT

## 2020-07-17 PROCEDURE — 6370000000 HC RX 637 (ALT 250 FOR IP): Performed by: PHYSICIAN ASSISTANT

## 2020-07-17 PROCEDURE — 99231 SBSQ HOSP IP/OBS SF/LOW 25: CPT | Performed by: SURGERY

## 2020-07-17 PROCEDURE — 6360000002 HC RX W HCPCS: Performed by: INTERNAL MEDICINE

## 2020-07-17 PROCEDURE — 6370000000 HC RX 637 (ALT 250 FOR IP): Performed by: INTERNAL MEDICINE

## 2020-07-17 PROCEDURE — 36415 COLL VENOUS BLD VENIPUNCTURE: CPT

## 2020-07-17 PROCEDURE — 80048 BASIC METABOLIC PNL TOTAL CA: CPT

## 2020-07-17 PROCEDURE — 2580000003 HC RX 258: Performed by: PHYSICIAN ASSISTANT

## 2020-07-17 PROCEDURE — 83735 ASSAY OF MAGNESIUM: CPT

## 2020-07-17 PROCEDURE — 6360000002 HC RX W HCPCS: Performed by: PHYSICIAN ASSISTANT

## 2020-07-17 PROCEDURE — 99233 SBSQ HOSP IP/OBS HIGH 50: CPT | Performed by: PSYCHIATRY & NEUROLOGY

## 2020-07-17 PROCEDURE — 6370000000 HC RX 637 (ALT 250 FOR IP): Performed by: NURSE PRACTITIONER

## 2020-07-17 RX ORDER — NICOTINE 21 MG/24HR
1 PATCH, TRANSDERMAL 24 HOURS TRANSDERMAL DAILY
Status: DISCONTINUED | OUTPATIENT
Start: 2020-07-18 | End: 2020-07-17

## 2020-07-17 RX ORDER — OXYCODONE HYDROCHLORIDE AND ACETAMINOPHEN 5; 325 MG/1; MG/1
1 TABLET ORAL EVERY 4 HOURS PRN
Status: DISCONTINUED | OUTPATIENT
Start: 2020-07-17 | End: 2020-07-20

## 2020-07-17 RX ORDER — MIDODRINE HYDROCHLORIDE 5 MG/1
2.5 TABLET ORAL 2 TIMES DAILY WITH MEALS
Status: DISCONTINUED | OUTPATIENT
Start: 2020-07-17 | End: 2020-07-21 | Stop reason: HOSPADM

## 2020-07-17 RX ORDER — NICOTINE 21 MG/24HR
1 PATCH, TRANSDERMAL 24 HOURS TRANSDERMAL DAILY
Status: DISCONTINUED | OUTPATIENT
Start: 2020-07-17 | End: 2020-07-21 | Stop reason: HOSPADM

## 2020-07-17 RX ORDER — MORPHINE SULFATE 2 MG/ML
2 INJECTION, SOLUTION INTRAMUSCULAR; INTRAVENOUS EVERY 4 HOURS PRN
Status: DISCONTINUED | OUTPATIENT
Start: 2020-07-17 | End: 2020-07-19 | Stop reason: ALTCHOICE

## 2020-07-17 RX ORDER — SODIUM CHLORIDE 9 MG/ML
INJECTION, SOLUTION INTRAVENOUS CONTINUOUS
Status: DISCONTINUED | OUTPATIENT
Start: 2020-07-20 | End: 2020-07-20

## 2020-07-17 RX ADMIN — Medication 10 ML: at 09:20

## 2020-07-17 RX ADMIN — ALPRAZOLAM 0.5 MG: 0.5 TABLET ORAL at 23:18

## 2020-07-17 RX ADMIN — CLOPIDOGREL 75 MG: 75 TABLET, FILM COATED ORAL at 09:16

## 2020-07-17 RX ADMIN — TIZANIDINE 4 MG: 4 TABLET ORAL at 09:16

## 2020-07-17 RX ADMIN — MIDODRINE HYDROCHLORIDE 2.5 MG: 5 TABLET ORAL at 09:16

## 2020-07-17 RX ADMIN — PANTOPRAZOLE SODIUM 40 MG: 40 TABLET, DELAYED RELEASE ORAL at 19:33

## 2020-07-17 RX ADMIN — ATORVASTATIN CALCIUM 10 MG: 10 TABLET, FILM COATED ORAL at 20:36

## 2020-07-17 RX ADMIN — ASPIRIN 325 MG ORAL TABLET 325 MG: 325 PILL ORAL at 09:16

## 2020-07-17 RX ADMIN — TRAZODONE HYDROCHLORIDE 150 MG: 50 TABLET ORAL at 20:36

## 2020-07-17 RX ADMIN — Medication 10 ML: at 20:36

## 2020-07-17 RX ADMIN — KETOROLAC TROMETHAMINE 30 MG: 30 INJECTION, SOLUTION INTRAMUSCULAR at 04:12

## 2020-07-17 RX ADMIN — ENOXAPARIN SODIUM 40 MG: 40 INJECTION SUBCUTANEOUS at 09:16

## 2020-07-17 RX ADMIN — PANTOPRAZOLE SODIUM 40 MG: 40 TABLET, DELAYED RELEASE ORAL at 06:21

## 2020-07-17 RX ADMIN — OXYCODONE HYDROCHLORIDE AND ACETAMINOPHEN 1 TABLET: 5; 325 TABLET ORAL at 20:36

## 2020-07-17 RX ADMIN — FLUTICASONE PROPIONATE 1 SPRAY: 50 SPRAY, METERED NASAL at 09:17

## 2020-07-17 RX ADMIN — ESCITALOPRAM OXALATE 10 MG: 10 TABLET ORAL at 09:16

## 2020-07-17 RX ADMIN — TIZANIDINE 4 MG: 4 TABLET ORAL at 19:34

## 2020-07-17 RX ADMIN — MIDODRINE HYDROCHLORIDE 2.5 MG: 5 TABLET ORAL at 23:24

## 2020-07-17 ASSESSMENT — ENCOUNTER SYMPTOMS
CONSTIPATION: 0
FACIAL SWELLING: 0
EYE DISCHARGE: 0
SHORTNESS OF BREATH: 0
ABDOMINAL DISTENTION: 0
DIARRHEA: 0
BLOOD IN STOOL: 0
PHOTOPHOBIA: 0
NAUSEA: 0
ABDOMINAL PAIN: 0
EYE REDNESS: 0
COUGH: 0
VOMITING: 0
CHEST TIGHTNESS: 0

## 2020-07-17 ASSESSMENT — PAIN SCALES - GENERAL
PAINLEVEL_OUTOF10: 6
PAINLEVEL_OUTOF10: 10
PAINLEVEL_OUTOF10: 8
PAINLEVEL_OUTOF10: 10
PAINLEVEL_OUTOF10: 8
PAINLEVEL_OUTOF10: 10
PAINLEVEL_OUTOF10: 10

## 2020-07-17 ASSESSMENT — PAIN DESCRIPTION - LOCATION: LOCATION: HEAD

## 2020-07-17 ASSESSMENT — PAIN DESCRIPTION - PAIN TYPE: TYPE: ACUTE PAIN

## 2020-07-17 NOTE — PROGRESS NOTES
Hospitalist Progress Note    Patient:  Geo Jones  Unit/Bed:3TN-3368/3368-01   YOB: 1960       MRN: [id number] Acct: [de-identified]  PCP: Carl Rivera, APRN-CNP, [facility] - NP    Date of Admission: 7/14/2020  --------------------------    Chief Complaint:      headache    Hospital Course:     Geo Jones is a 62 y.o. female hospitalized on 7/14/2020 for headache, find to have severe hyponatremia. Known history of left internal carotid artery stenosis requiring endarterectomy    During her hospital was noted to have severe hypotension, improved with midodrine and stopping her multiple antihypertensive agent. Assessment:       1. Moderate to severe hyponatremia, improving  2. Hypotension, hold all blood pressure medication including hydrochlorothiazide, currently on Midodrine   3. Headache, evaluated by neurology, no large territorial infarct or bleeding. 4. Symptomatic left internal carotid artery occlusion/TIA       comorbidities:    · Hep C  · Tobacco dependence  ·     Plan:  1. Continue to monitor sodium levels, appreciate nephrology team.  Avoidance of hydrochlorothiazide  2. Continue holding blood pressure medication, midodrine. Monitor blood pressure  3. Appreciate neurology team input. 4. Cardiology  preop assessment seen, no further testing. 5. Plan for carotid endarterectomy on Monday. 6. Headache management per neurology    Code Status: Full Code         DVT prophylaxis: SCD  For now, was receiving Lovenox prior unclear why discontinued     Disposition: Likely home on Tuesday    I discussed my thought processes at length with patient/family and patient understood. Question and concerns  Addressed      Discussed with RN      ----------------      Subjective:     Patient seen and examined  Overnight events noted  RN and ancillary staff note reviewed    Frontal headache, no localized weakness or numbness. No other complaint.       Diet: DIET 13   CREATININE 0.9  --   --  0.8 0.9   CALCIUM 9.3  --   --  9.3 9.4    < > = values in this interval not displayed. No results for input(s): AST, ALT, BILIDIR, BILITOT, ALKPHOS in the last 72 hours. Recent Labs     07/14/20  1617   INR 0.96     No results for input(s): Melinda Johnson in the last 72 hours. Urinalysis:      Lab Results   Component Value Date    NITRU Negative 07/13/2020    WBCUA 0 07/13/2020    RBCUA 4 07/13/2020    BLOODU TRACE 07/13/2020    SPECGRAV 1.008 07/13/2020    GLUCOSEU Negative 07/13/2020       Radiology:  MRI BRAIN WO CONTRAST   Final Result   1. No acute intracranial abnormality. Specifically, no acute infarction. 2. Mild parenchymal volume loss. Sequela of minimal chronic microvascular   ischemic changes. CTA HEAD NECK W CONTRAST   Preliminary Result   Near-total occlusion of the proximal left internal carotid artery without   full distal lumen collapse. RECOMMENDATIONS:   Vince Ivy. Carotid Near-Occlusion: A Comprehensive Review, Part   1--Definition, Terminology, and Diagnosis. AJNR Am J Neuroradiol. 2016   Jan;37(1):2-10. doi: 10.3174/ajnr. . Epub 2015 Aug 27. Review. PubMed   PMID: 53673016.   [employer]. org/content/ajnr/early/2015/09/24/ajnr. .full. pdf         CT HEAD WO CONTRAST   Final Result   No acute intracranial abnormality.                      Electronically signed by Rhodia Dancer, MD on 7/17/2020 at 8:13 AM

## 2020-07-17 NOTE — PROGRESS NOTES
VASCULAR    Remains hospitalized. No neuro events. Plan L CEA on Monday per Dr. Alex Roa.     Malachi Li

## 2020-07-17 NOTE — PROGRESS NOTES
NEUROLOGY FOLLOWUP    HISTORY OF PRESENT ILLNESS :     Dilcia Delarosa is a 62 y.o. female   History was obtained from the patient and dictations in the chart. Patient still continues to have a headache. She states is mild to moderate in severity and present in the frontal regions. She has had 2 episodes of amaurosis fugax in the last few weeks and was found to have a severe left internal carotid artery stenosis. Patient is scheduled for left carotid endarterectomy on Monday. She denies any further neurological symptoms. REVIEW OF SYSTEMS       Constitutional:  []   Chills   []  Fatigue   []  Fevers   []  Malaise   []  Weight loss     [x] Denies all of the above    Respiratory:   []  Cough    []  Shortness of breath         [x] Denies all of the above     Cardiovascular:   []  Chest pain    []  Exertional chest pressure/discomfort           [] Palpitations    []  Syncope     [x] Denies all of the above    Past Medical History:   Diagnosis Date    Anxiety     Arthritis     Hepatitis C     Hyperlipidemia     Hypertension     Reflux esophagitis      History reviewed. No pertinent family history. Social History     Socioeconomic History    Marital status:       Spouse name: None    Number of children: None    Years of education: None    Highest education level: None   Occupational History    None   Social Needs    Financial resource strain: None    Food insecurity     Worry: None     Inability: None    Transportation needs     Medical: None     Non-medical: None   Tobacco Use    Smoking status: Current Every Day Smoker     Packs/day: 0.50     Types: Cigarettes    Smokeless tobacco: Never Used   Substance and Sexual Activity    Alcohol use: Yes     Frequency: 2-3 times a week     Comment: soc    Drug use: Never    Sexual activity: None   Lifestyle    Physical activity     Days per week: None Minutes per session: None    Stress: None   Relationships    Social connections     Talks on phone: None     Gets together: None     Attends Sikhism service: None     Active member of club or organization: None     Attends meetings of clubs or organizations: None     Relationship status: None    Intimate partner violence     Fear of current or ex partner: None     Emotionally abused: None     Physically abused: None     Forced sexual activity: None   Other Topics Concern    None   Social History Narrative    None        PHYSICAL EXAMINATION      /68   Pulse 57   Temp 97.6 °F (36.4 °C) (Oral)   Resp 16   Ht 5' 3\" (1.6 m)   Wt 127 lb 4.8 oz (57.7 kg)   SpO2 98%   BMI 22.55 kg/m²   This is a well-nourished patient in no acute distress  Patient is awake, alert and oriented x3. Speech is normal.  Pupils are equal round reacting to light. Extraocular movements intact. Face symmetrical. Tongue midline. Motor exam shows normal symmetrical strength. Deep tendon reflexes normal. Plantar reflexes downgoing. Sensory exam normal. Coordination normal. Gait normal. No carotid bruit. No neck stiffness. DATA :  LABS:  General Labs:    CBC:   Lab Results   Component Value Date    WBC 4.2 07/17/2020    RBC 3.88 07/17/2020    HGB 11.9 07/17/2020    HCT 35.4 07/17/2020    MCV 91.2 07/17/2020    MCH 30.6 07/17/2020    MCHC 33.6 07/17/2020    RDW 13.9 07/17/2020     07/17/2020    MPV 6.4 07/17/2020     BMP:    Lab Results   Component Value Date     07/17/2020    K 4.0 07/17/2020    K 4.2 07/15/2020    CL 99 07/17/2020    CO2 22 07/17/2020    BUN 13 07/17/2020    CREATININE 0.9 07/17/2020    CALCIUM 9.4 07/17/2020    GFRAA >60 07/17/2020    LABGLOM >60 07/17/2020    GLUCOSE 93 07/17/2020     RADIOLOGY REVIEW:  I have reviewed radiology image(s) and reports(s) of: MRI brain      IMPRESSION :  TIA  Severe left internal carotid artery stenosis  Headache,?   Muscle contraction headaches due to stress  Sedimentation rate and C-reactive protein levels were normal.  Unlikely to be temporal arteritis. MRI brain images were reviewed and were normal without any acute ischemic changes  Patient Active Problem List   Diagnosis    Hyponatremia    Hypokalemia    Internal carotid artery occlusion, left    Leukopenia    Hepatitis C    HTN (hypertension)       RECOMMENDATIONS :  Discussed with patient  Explained MRI brain findings and the lab results  Trial of low-dose amitriptyline was considered to see if it will help the headaches but patient already on citalopram as well as trazodone. Await left carotid endarterectomy in the next few days  Continue antiplatelet therapy with aspirin and Plavix  Continue statin therapy with Lipitor  High risk patient because of severe carotid stenosis and TIA        Please note a portion of this chart was generated using dragon dictation software. Although every effort was made to ensure the accuracy of this automated transcription, some errors in transcription may have occurred.          Mayco Porter M.D.

## 2020-07-17 NOTE — PROGRESS NOTES
[facility] Note    Patient Active Problem List   Diagnosis    Hyponatremia    Hypokalemia    Internal carotid artery occlusion, left    Leukopenia    Hepatitis C    HTN (hypertension)       Past Medical History:   has a past medical history of Anxiety, Arthritis, Hepatitis C, Hyperlipidemia, Hypertension, and Reflux esophagitis. Past Social History:   reports that she has been smoking cigarettes. She has been smoking about 0.50 packs per day. She has never used smokeless tobacco. She reports current alcohol use. She reports that she does not use drugs. Subjective:  No complaints except for Headache, better    Review of Systems   Constitutional: Negative for activity change, appetite change, chills, fatigue, fever and unexpected weight change. HENT: Negative for congestion and facial swelling. Eyes: Negative for photophobia, discharge and redness. Respiratory: Negative for cough, chest tightness and shortness of breath. Cardiovascular: Negative for chest pain, palpitations and leg swelling. Gastrointestinal: Negative for abdominal distention, abdominal pain, blood in stool, constipation, diarrhea, nausea and vomiting. Endocrine: Negative for cold intolerance, heat intolerance and polyuria. Genitourinary: Negative for decreased urine volume, difficulty urinating, flank pain and hematuria. Musculoskeletal: Negative for joint swelling and neck pain. Neurological: Positive for headaches. Negative for dizziness, seizures, syncope, speech difficulty and light-headedness. Hematological: Does not bruise/bleed easily. Psychiatric/Behavioral: Negative for agitation, confusion and hallucinations.        Objective:      /76   Pulse 63   Temp 97.7 °F (36.5 °C) (Axillary)   Resp 15   Ht 5' 3\" (1.6 m)   Wt 127 lb 4.8 oz (57.7 kg)   SpO2 98%   BMI 22.55 kg/m²     Wt Readings from Last 3 Encounters:   07/17/20 127 lb 4.8 oz (57.7 kg)   06/23/20 134 lb 9.6 oz (61.1 kg)       BP Readings from Last 3 Encounters:   07/17/20 129/76   06/23/20 90/68     Chest- clear  Heart-regular  Abd-soft  Ext- no edema    Labs  Hemoglobin   Date Value Ref Range Status   07/17/2020 11.9 (L) 12.0 - 16.0 g/dL Final     Hematocrit   Date Value Ref Range Status   07/17/2020 35.4 (L) 36.0 - 48.0 % Final     WBC   Date Value Ref Range Status   07/17/2020 4.2 4.0 - 11.0 K/uL Final     Platelets   Date Value Ref Range Status   07/17/2020 196 135 - 450 K/uL Final     Lab Results   Component Value Date    CREATININE 0.9 07/17/2020    BUN 13 07/17/2020     (L) 07/17/2020    K 4.0 07/17/2020    CL 99 07/17/2020    CO2 22 07/17/2020   UOsm 206  Saranya less than 20  TSH WNL    Assessment/Plan:  1. Hyponatremia, unclear duration. Sodium increased from 122 to 129 today. Needed D5W to slow down correction. Etiology probably secondary to decreased salt intake plus hypotension plus HCTZ. Rate of correction now adequate. 2.  Hypotension. Hold off on antihypertensives. Better. Decrease Midodrine. 3.  Renal function is within normal limits. 4.  History of severe left ICA stenosis- Vascular surgery following. For CEA Monday. 5.  History of hepatitis C. Normal renal function. UA only showed trace blood  6. Currently on MIdodrine  7. Headache- w/u per Medicine and Neurology  8. Stable from renal perspective.      Mayte Padron MD

## 2020-07-18 LAB
ANION GAP SERPL CALCULATED.3IONS-SCNC: 14 MMOL/L (ref 3–16)
BUN BLDV-MCNC: 11 MG/DL (ref 7–20)
CALCIUM SERPL-MCNC: 10 MG/DL (ref 8.3–10.6)
CHLORIDE BLD-SCNC: 103 MMOL/L (ref 99–110)
CO2: 17 MMOL/L (ref 21–32)
CREAT SERPL-MCNC: 0.8 MG/DL (ref 0.6–1.2)
GFR AFRICAN AMERICAN: >60
GFR NON-AFRICAN AMERICAN: >60
GLUCOSE BLD-MCNC: 96 MG/DL (ref 70–99)
GLUCOSE BLD-MCNC: 97 MG/DL (ref 70–99)
HCT VFR BLD CALC: 39 % (ref 36–48)
HEMOGLOBIN: 13.1 G/DL (ref 12–16)
MCH RBC QN AUTO: 31.1 PG (ref 26–34)
MCHC RBC AUTO-ENTMCNC: 33.6 G/DL (ref 31–36)
MCV RBC AUTO: 92.6 FL (ref 80–100)
PDW BLD-RTO: 13.9 % (ref 12.4–15.4)
PERFORMED ON: NORMAL
PLATELET # BLD: 210 K/UL (ref 135–450)
PMV BLD AUTO: 6.5 FL (ref 5–10.5)
POTASSIUM SERPL-SCNC: 4 MMOL/L (ref 3.5–5.1)
RBC # BLD: 4.21 M/UL (ref 4–5.2)
SODIUM BLD-SCNC: 134 MMOL/L (ref 136–145)
WBC # BLD: 4.6 K/UL (ref 4–11)

## 2020-07-18 PROCEDURE — 6360000002 HC RX W HCPCS: Performed by: PHYSICIAN ASSISTANT

## 2020-07-18 PROCEDURE — 80048 BASIC METABOLIC PNL TOTAL CA: CPT

## 2020-07-18 PROCEDURE — 6360000002 HC RX W HCPCS: Performed by: INTERNAL MEDICINE

## 2020-07-18 PROCEDURE — 6370000000 HC RX 637 (ALT 250 FOR IP): Performed by: INTERNAL MEDICINE

## 2020-07-18 PROCEDURE — 2060000000 HC ICU INTERMEDIATE R&B

## 2020-07-18 PROCEDURE — 85027 COMPLETE CBC AUTOMATED: CPT

## 2020-07-18 PROCEDURE — 99232 SBSQ HOSP IP/OBS MODERATE 35: CPT | Performed by: PSYCHIATRY & NEUROLOGY

## 2020-07-18 PROCEDURE — 36415 COLL VENOUS BLD VENIPUNCTURE: CPT

## 2020-07-18 PROCEDURE — 2580000003 HC RX 258: Performed by: PHYSICIAN ASSISTANT

## 2020-07-18 PROCEDURE — 6370000000 HC RX 637 (ALT 250 FOR IP): Performed by: PHYSICIAN ASSISTANT

## 2020-07-18 PROCEDURE — 6370000000 HC RX 637 (ALT 250 FOR IP): Performed by: NURSE PRACTITIONER

## 2020-07-18 RX ORDER — ZIPRASIDONE MESYLATE 20 MG/ML
20 INJECTION, POWDER, LYOPHILIZED, FOR SOLUTION INTRAMUSCULAR ONCE
Status: COMPLETED | OUTPATIENT
Start: 2020-07-18 | End: 2020-07-18

## 2020-07-18 RX ADMIN — TIZANIDINE 4 MG: 4 TABLET ORAL at 21:56

## 2020-07-18 RX ADMIN — FLUTICASONE PROPIONATE 1 SPRAY: 50 SPRAY, METERED NASAL at 09:10

## 2020-07-18 RX ADMIN — ALPRAZOLAM 0.5 MG: 0.5 TABLET ORAL at 21:59

## 2020-07-18 RX ADMIN — TRAZODONE HYDROCHLORIDE 150 MG: 50 TABLET ORAL at 21:56

## 2020-07-18 RX ADMIN — MIDODRINE HYDROCHLORIDE 2.5 MG: 5 TABLET ORAL at 09:15

## 2020-07-18 RX ADMIN — TIZANIDINE 4 MG: 4 TABLET ORAL at 09:08

## 2020-07-18 RX ADMIN — ATORVASTATIN CALCIUM 10 MG: 10 TABLET, FILM COATED ORAL at 21:56

## 2020-07-18 RX ADMIN — Medication 10 ML: at 21:52

## 2020-07-18 RX ADMIN — CLOPIDOGREL 75 MG: 75 TABLET, FILM COATED ORAL at 09:08

## 2020-07-18 RX ADMIN — ESCITALOPRAM OXALATE 10 MG: 10 TABLET ORAL at 09:07

## 2020-07-18 RX ADMIN — OXYCODONE HYDROCHLORIDE AND ACETAMINOPHEN 1 TABLET: 5; 325 TABLET ORAL at 18:18

## 2020-07-18 RX ADMIN — TIZANIDINE 4 MG: 4 TABLET ORAL at 15:43

## 2020-07-18 RX ADMIN — ZIPRASIDONE MESYLATE 20 MG: 20 INJECTION, POWDER, LYOPHILIZED, FOR SOLUTION INTRAMUSCULAR at 04:21

## 2020-07-18 RX ADMIN — MIDODRINE HYDROCHLORIDE 2.5 MG: 5 TABLET ORAL at 18:00

## 2020-07-18 RX ADMIN — ASPIRIN 325 MG ORAL TABLET 325 MG: 325 PILL ORAL at 09:08

## 2020-07-18 RX ADMIN — PANTOPRAZOLE SODIUM 40 MG: 40 TABLET, DELAYED RELEASE ORAL at 09:15

## 2020-07-18 RX ADMIN — Medication 10 ML: at 09:16

## 2020-07-18 RX ADMIN — PANTOPRAZOLE SODIUM 40 MG: 40 TABLET, DELAYED RELEASE ORAL at 15:43

## 2020-07-18 RX ADMIN — ONDANSETRON 4 MG: 2 INJECTION INTRAMUSCULAR; INTRAVENOUS at 02:45

## 2020-07-18 ASSESSMENT — PAIN DESCRIPTION - LOCATION: LOCATION: HEAD

## 2020-07-18 ASSESSMENT — ENCOUNTER SYMPTOMS
DIARRHEA: 0
BLOOD IN STOOL: 0
ABDOMINAL DISTENTION: 0
PHOTOPHOBIA: 0
EYE REDNESS: 0
VOMITING: 0
FACIAL SWELLING: 0
NAUSEA: 0
COUGH: 0
SHORTNESS OF BREATH: 0
ABDOMINAL PAIN: 0
CONSTIPATION: 0
CHEST TIGHTNESS: 0
EYE DISCHARGE: 0

## 2020-07-18 ASSESSMENT — PAIN DESCRIPTION - ORIENTATION: ORIENTATION: RIGHT;LEFT

## 2020-07-18 ASSESSMENT — PAIN DESCRIPTION - FREQUENCY: FREQUENCY: INTERMITTENT

## 2020-07-18 ASSESSMENT — PAIN SCALES - GENERAL
PAINLEVEL_OUTOF10: 0
PAINLEVEL_OUTOF10: 8
PAINLEVEL_OUTOF10: 0
PAINLEVEL_OUTOF10: 5

## 2020-07-18 ASSESSMENT — PAIN DESCRIPTION - ONSET: ONSET: ON-GOING

## 2020-07-18 ASSESSMENT — PAIN DESCRIPTION - DESCRIPTORS: DESCRIPTORS: POUNDING

## 2020-07-18 ASSESSMENT — PAIN DESCRIPTION - PAIN TYPE: TYPE: ACUTE PAIN

## 2020-07-18 NOTE — PROGRESS NOTES
Hospitalist Progress Note    Patient:  Kavita Meng  Unit/Bed:3TN-3368/3368-01   YOB: 1960       MRN: [id number] Acct: [de-identified]  PCP: Trey Pradhan, ROOSEVELT-CNP, ROOSEVELT - NP    Date of Admission: 7/14/2020  --------------------------    Chief Complaint:      headache    Hospital Course:     Kavita Meng is a 62 y.o. female hospitalized on 7/14/2020 for headache, find to have severe hyponatremia. Known history of left internal carotid artery stenosis requiring endarterectomy    During her hospital was noted to have severe hypotension, improved with midodrine and stopping her multiple antihypertensive agent. Assessment:       1. Moderate to severe hyponatremia, improving  2. Hypotension, hold all blood pressure medication including hydrochlorothiazide, currently on Midodrine   3. Headache, evaluated by neurology, no large territorial infarct or bleeding. Resolved   4. symptomatic left internal carotid artery occlusion/TIA  5. Transient episode of confusion of undetermined etiology. Returned to baseline. No focal neurological deficit.       comorbidities:    · Hep C  · Tobacco dependence  ·     Plan:  1. Appreciate neurology team input. Continue monitoring. Headache improved. 2. Sodium level improved. Nephrology following. 3. Continue holding blood pressure medication, continue Middaugh drain  4. Plan for left carotid endarterectomy on Monday. 5. Continue to monitor sodium level,      Code Status: Full Code         DVT prophylaxis: SCD       Disposition: Likely home on Tuesday    I discussed my thought processes at length with patient/family and patient understood. Question and concerns  Addressed      Discussed with RN      ----------------      Subjective:     Patient seen and examined  Overnight events noted  RN and ancillary staff note reviewed    No complaint today. Overnight he she had a brief episode of confusion and delusional thoughts.   She required restraints and Geodon. Symptoms completely resolved today. Improvement of sodium as expected. Diet: DIET GENERAL;  Diet NPO, After Midnight Exceptions are: Sips with Meds    OBJECTIVE     Exam:  /71   Pulse 75   Temp 97.8 °F (36.6 °C) (Oral)   Resp 16   Ht 5' 3\" (1.6 m)   Wt 127 lb 4.8 oz (57.7 kg)   SpO2 97%   BMI 22.55 kg/m²            Gen: Not in distress. Alert. Head: Normocephalic. Atraumatic. Eyes: Conjunctivae/corneas clear. ENT: Oral mucosa moist  Neck: No JVD. No obvious thyromegaly. CVS: Nml S1S2, no murmur  , RRR  Pulmomary: Clear bilaterally. No crackles. No wheezes. Gastrointestinal: Soft, non tender, non distend, . Musculoskeletal: No edema. Warm  Neuro: No focal deficit. Moves extremity spontaneously. Psychiatry: Appropriate affect. Not agitated.     Medications:  Reviewed    Infusion Medications    [START ON 7/20/2020] sodium chloride       Scheduled Medications    [START ON 7/20/2020] ceFAZolin  2 g Intravenous On Call to OR    midodrine  2.5 mg Oral BID WC    nicotine  1 patch Transdermal Daily    escitalopram  10 mg Oral Daily    fluticasone  1 spray Each Nostril Daily    pantoprazole  40 mg Oral BID AC    atorvastatin  10 mg Oral Nightly    tiZANidine  4 mg Oral TID    traZODone  150 mg Oral Nightly    sodium chloride flush  10 mL Intravenous 2 times per day    aspirin  325 mg Oral Daily    clopidogrel  75 mg Oral Daily     PRN Meds: morphine, oxyCODONE-acetaminophen, perflutren lipid microspheres, ketorolac, ALPRAZolam, sodium chloride flush, acetaminophen **OR** acetaminophen, magnesium hydroxide, promethazine **OR** ondansetron, potassium chloride **OR** potassium alternative oral replacement **OR** potassium chloride      Intake/Output Summary (Last 24 hours) at 7/18/2020 1442  Last data filed at 7/18/2020 1415  Gross per 24 hour   Intake 600 ml   Output 2950 ml   Net -2350 ml             Labs:   Recent Labs     07/16/20  0611 07/17/20  0448 07/18/20  8232 WBC 4.0 4.2 4.6   HGB 11.8* 11.9* 13.1   HCT 34.7* 35.4* 39.0    196 210     Recent Labs     07/16/20  0611 07/17/20  0448 07/18/20  0502   * 130* 134*   K 4.2 4.0 4.0   CL 99 99 103   CO2 24 22 17*   BUN 12 13 11   CREATININE 0.8 0.9 0.8   CALCIUM 9.3 9.4 10.0     No results for input(s): AST, ALT, BILIDIR, BILITOT, ALKPHOS in the last 72 hours. No results for input(s): INR in the last 72 hours. No results for input(s): Dulcie Mean in the last 72 hours. Urinalysis:      Lab Results   Component Value Date    NITRU Negative 07/13/2020    WBCUA 0 07/13/2020    RBCUA 4 07/13/2020    BLOODU TRACE 07/13/2020    SPECGRAV 1.008 07/13/2020    GLUCOSEU Negative 07/13/2020       Radiology:  MRI BRAIN WO CONTRAST   Final Result   1. No acute intracranial abnormality. Specifically, no acute infarction. 2. Mild parenchymal volume loss. Sequela of minimal chronic microvascular   ischemic changes. CTA HEAD NECK W CONTRAST   Preliminary Result   Near-total occlusion of the proximal left internal carotid artery without   full distal lumen collapse. RECOMMENDATIONS:   Edith Lee. Carotid Near-Occlusion: A Comprehensive Review, Part   1--Definition, Terminology, and Diagnosis. AJNR Am J Neuroradiol. 2016   Jan;37(1):2-10. doi: 10.3174/ajnr. . Epub 2015 Aug 27. Review. PubMed   PMID: 26004972.   [employer]. org/[email or web address]/early/[email or web address]/09/24/ajnr. .full. pdf         CT HEAD WO CONTRAST   Final Result   No acute intracranial abnormality.                      Electronically signed by Sammie Josue MD on 7/18/2020 at 2:42 PM

## 2020-07-18 NOTE — PROGRESS NOTES
NEUROLOGY FOLLOWUP    HISTORY OF PRESENT ILLNESS :     Betty Zee is a 62 y.o. female   History was obtained from the patient and dictations in the chart. Patient's headache is better. She had some confusion and disorientation last night but it is back to normal now. She has had 2 episodes of amaurosis fugax in the last few weeks and was found to have a severe left internal carotid artery stenosis. Patient is scheduled for left carotid endarterectomy on Monday. She denies any further neurological symptoms. REVIEW OF SYSTEMS       Constitutional:  []   Chills   []  Fatigue   []  Fevers   []  Malaise   []  Weight loss     [x] Denies all of the above    Respiratory:   []  Cough    []  Shortness of breath         [x] Denies all of the above     Cardiovascular:   []  Chest pain    []  Exertional chest pressure/discomfort           [] Palpitations    []  Syncope     [x] Denies all of the above    Past Medical History:   Diagnosis Date    Anxiety     Arthritis     Hepatitis C     Hyperlipidemia     Hypertension     Reflux esophagitis      History reviewed. No pertinent family history. Social History     Socioeconomic History    Marital status:       Spouse name: None    Number of children: None    Years of education: None    Highest education level: None   Occupational History    None   Social Needs    Financial resource strain: None    Food insecurity     Worry: None     Inability: None    Transportation needs     Medical: None     Non-medical: None   Tobacco Use    Smoking status: Current Every Day Smoker     Packs/day: 0.50     Types: Cigarettes    Smokeless tobacco: Never Used   Substance and Sexual Activity    Alcohol use: Yes     Frequency: 2-3 times a week     Comment: soc    Drug use: Never    Sexual activity: None   Lifestyle    Physical activity     Days per week: None     Minutes per session: None    Stress: None   Relationships    Social connections     Talks on phone: None     Gets together: None     Attends Orthodox service: None     Active member of club or organization: None     Attends meetings of clubs or organizations: None     Relationship status: None    Intimate partner violence     Fear of current or ex partner: None     Emotionally abused: None     Physically abused: None     Forced sexual activity: None   Other Topics Concern    None   Social History Narrative    None        PHYSICAL EXAMINATION      /66   Pulse 74   Temp 97.5 °F (36.4 °C) (Oral)   Resp 15   Ht 5' 3\" (1.6 m)   Wt 127 lb 4.8 oz (57.7 kg)   SpO2 98%   BMI 22.55 kg/m²   This is a well-nourished patient in no acute distress  Patient is awake, alert and oriented x3. Speech is normal.  Pupils are equal round reacting to light. Extraocular movements intact. Face symmetrical. Tongue midline. Motor exam shows normal symmetrical strength. Deep tendon reflexes normal. Plantar reflexes downgoing. Sensory exam normal. Coordination normal. Gait normal. No carotid bruit. No neck stiffness. DATA :  LABS:  General Labs:    CBC:   Lab Results   Component Value Date    WBC 4.6 07/18/2020    RBC 4.21 07/18/2020    HGB 13.1 07/18/2020    HCT 39.0 07/18/2020    MCV 92.6 07/18/2020    MCH 31.1 07/18/2020    MCHC 33.6 07/18/2020    RDW 13.9 07/18/2020     07/18/2020    MPV 6.5 07/18/2020     BMP:    Lab Results   Component Value Date     07/18/2020    K 4.0 07/18/2020    K 4.2 07/15/2020     07/18/2020    CO2 17 07/18/2020    BUN 11 07/18/2020    CREATININE 0.8 07/18/2020    CALCIUM 10.0 07/18/2020    GFRAA >60 07/18/2020    LABGLOM >60 07/18/2020    GLUCOSE 96 07/18/2020     RADIOLOGY REVIEW:  I have reviewed radiology image(s) and reports(s) of: MRI brain      IMPRESSION :  TIA  Severe left internal carotid artery stenosis  Headache,?   Muscle contraction headaches due to stress  Sedimentation rate and C-reactive protein levels were normal.  Unlikely to be temporal arteritis. MRI brain images were reviewed and were normal without any acute ischemic changes  Had some brief confusion and disorientation last night,? Etiology. Patient is now back to normal as far as her mental status is concerned. Patient Active Problem List   Diagnosis    Hyponatremia    Hypokalemia    Internal carotid artery occlusion, left    Leukopenia    Hepatitis C    HTN (hypertension)       RECOMMENDATIONS :  Discussed with patient  Await left carotid endarterectomy in the next few days  Continue antiplatelet therapy with aspirin and Plavix  Continue statin therapy with Lipitor          Please note a portion of this chart was generated using dragon dictation software. Although every effort was made to ensure the accuracy of this automated transcription, some errors in transcription may have occurred.          Jovita Palm M.D.
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
## 2020-07-18 NOTE — PROGRESS NOTES
[facility] Note    Patient Active Problem List   Diagnosis    Hyponatremia    Hypokalemia    Internal carotid artery occlusion, left    Leukopenia    Hepatitis C    HTN (hypertension)       Past Medical History:   has a past medical history of Anxiety, Arthritis, Hepatitis C, Hyperlipidemia, Hypertension, and Reflux esophagitis. Past Social History:   reports that she has been smoking cigarettes. She has been smoking about 0.50 packs per day. She has never used smokeless tobacco. She reports current alcohol use. She reports that she does not use drugs. Subjective:  No new complaints    Review of Systems   Constitutional: Negative for activity change, appetite change, chills, fatigue, fever and unexpected weight change. HENT: Negative for congestion and facial swelling. Eyes: Negative for photophobia, discharge and redness. Respiratory: Negative for cough, chest tightness and shortness of breath. Cardiovascular: Negative for chest pain, palpitations and leg swelling. Gastrointestinal: Negative for abdominal distention, abdominal pain, blood in stool, constipation, diarrhea, nausea and vomiting. Endocrine: Negative for cold intolerance, heat intolerance and polyuria. Genitourinary: Negative for decreased urine volume, difficulty urinating, flank pain and hematuria. Musculoskeletal: Negative for joint swelling and neck pain. Neurological: Positive for headaches. Negative for dizziness, seizures, syncope, speech difficulty and light-headedness. Hematological: Does not bruise/bleed easily. Psychiatric/Behavioral: Negative for agitation, confusion and hallucinations.        Objective:      /66   Pulse 74   Temp 97.5 °F (36.4 °C) (Oral)   Resp 15   Ht 5' 3\" (1.6 m)   Wt 127 lb 4.8 oz (57.7 kg)   SpO2 98%   BMI 22.55 kg/m²     Wt Readings from Last 3 Encounters:   07/17/20 127 lb 4.8 oz (57.7 kg)   06/23/20 134 lb 9.6 oz (61.1 kg)       BP Readings from Last 3 Encounters:   07/18/20 127/66   06/23/20 90/68     Chest- clear  Heart-regular  Abd-soft  Ext- no edema    Labs  Hemoglobin   Date Value Ref Range Status   07/18/2020 13.1 12.0 - 16.0 g/dL Final     Hematocrit   Date Value Ref Range Status   07/18/2020 39.0 36.0 - 48.0 % Final     WBC   Date Value Ref Range Status   07/18/2020 4.6 4.0 - 11.0 K/uL Final     Platelets   Date Value Ref Range Status   07/18/2020 210 135 - 450 K/uL Final     Lab Results   Component Value Date    CREATININE 0.8 07/18/2020    BUN 11 07/18/2020     (L) 07/18/2020    K 4.0 07/18/2020     07/18/2020    CO2 17 (L) 07/18/2020   UOsm 206  Saranya less than 20  TSH WNL    Assessment/Plan:  1. Hyponatremia, unclear duration. At goal  Needed D5W to slow down correction. Etiology probably secondary to decreased salt intake plus hypotension plus HCTZ. Rate of correction now adequate. 2.  Hypotension. Hold off on antihypertensives. Better. Decreased Midodrine. 3.  Renal function is within normal limits. 4.  History of severe left ICA stenosis- Vascular surgery following. For CEA Monday. 5.  History of hepatitis C. Normal renal function. UA only showed trace blood  6. Currently on MIdodrine  7. Headache- w/u per Medicine and Neurology  8. Stable from renal perspective.      Ling Valle MD

## 2020-07-18 NOTE — PLAN OF CARE
Problem: Pain:  Goal: Control of acute pain  Description: Control of acute pain  Outcome: Ongoing   Pt denies headache, stating that she's can no longer fel the pain in her temples. Pain controlled with current PRNs. Problem: Falls - Risk of:  Goal: Will remain free from falls  Description: Will remain free from falls  Outcome: Ongoing   Pt remains low fall risk, alert and independent. Bed locked and ni lowest position. Non skid socks on. Pt calls out for needs. Problem: Restraint Use - Violent/Self-Destructive Behavior:  Goal: Absence of restraint indications  Description: Absence of restraint indications  Outcome: Met This Shift   Pt remains alert and oriented and pleasant. No need for restraints. Pt aware of actions last night and is very apologetic and embarrassed about how she acted-- she is still unsure of why she couldn't recall anything. Did look back through pt's chart review and pt mentioned to PCP per office note that she had been having difficulty remembering things recently. + fam hx of dementia- dad.

## 2020-07-18 NOTE — PLAN OF CARE
Problem: Falls - Risk of:  Goal: Will remain free from falls  Description: Will remain free from falls  Outcome: Ongoing    Dr. Mamadou Jameson in [facility], \"pt is confused, combative with staff, yelling, screaming, & biting.   security at bedside, restraints placed\"    MD called now and placed orders

## 2020-07-18 NOTE — PLAN OF CARE
Problem: Pain:  Goal: Pain level will decrease  Description: Pain level will decrease  Outcome: Ongoing  Note: Pt has percocet and toradol for pain management. Pt's pain is 8-10/10. Pain medication given as requested/ordered. Problem: Falls - Risk of:  Goal: Will remain free from falls  Description: Will remain free from falls  Outcome: Ongoing  Note: Fall risk assessment completed. Pt considered a low fall risk. Pt alert and oriented x4, pt gait is steady with no use of assistive devices, uses call light appropriately, VU to call if in need of assistance. Will continue to monitor. Goal: Absence of physical injury  Description: Absence of physical injury  Note: Fall risk assessment completed. Pt gait steady. Clear pathway provided to and from bathroom as pt is ambulatory. Bed kept in lowest position with wheels locked. Call light within reach. Pt encouraged to call for any needs. Pt free from accidental injury this shift.

## 2020-07-18 NOTE — PROGRESS NOTES
Vascular    Nursing reports some disorientation overnight. No neuro deficits. L CEA Monday by Dr Karely Tafoya.

## 2020-07-19 ENCOUNTER — APPOINTMENT (OUTPATIENT)
Dept: CT IMAGING | Age: 60
DRG: 629 | End: 2020-07-19
Payer: COMMERCIAL

## 2020-07-19 LAB
ABO/RH: NORMAL
ANION GAP SERPL CALCULATED.3IONS-SCNC: 10 MMOL/L (ref 3–16)
ANTIBODY SCREEN: NORMAL
BUN BLDV-MCNC: 15 MG/DL (ref 7–20)
CALCIUM SERPL-MCNC: 9.4 MG/DL (ref 8.3–10.6)
CHLORIDE BLD-SCNC: 108 MMOL/L (ref 99–110)
CO2: 22 MMOL/L (ref 21–32)
CREAT SERPL-MCNC: 0.9 MG/DL (ref 0.6–1.2)
GFR AFRICAN AMERICAN: >60
GFR NON-AFRICAN AMERICAN: >60
GLUCOSE BLD-MCNC: 148 MG/DL (ref 70–99)
GLUCOSE BLD-MCNC: 86 MG/DL (ref 70–99)
HCT VFR BLD CALC: 36.8 % (ref 36–48)
HEMOGLOBIN: 12.2 G/DL (ref 12–16)
INR BLD: 0.95 (ref 0.86–1.14)
MCH RBC QN AUTO: 30.4 PG (ref 26–34)
MCHC RBC AUTO-ENTMCNC: 33.1 G/DL (ref 31–36)
MCV RBC AUTO: 91.7 FL (ref 80–100)
PDW BLD-RTO: 13.9 % (ref 12.4–15.4)
PERFORMED ON: ABNORMAL
PLATELET # BLD: 217 K/UL (ref 135–450)
PMV BLD AUTO: 6.8 FL (ref 5–10.5)
POTASSIUM SERPL-SCNC: 4.4 MMOL/L (ref 3.5–5.1)
PROTHROMBIN TIME: 11 SEC (ref 10–13.2)
RBC # BLD: 4.01 M/UL (ref 4–5.2)
SODIUM BLD-SCNC: 140 MMOL/L (ref 136–145)
TROPONIN: <0.01 NG/ML
WBC # BLD: 4.5 K/UL (ref 4–11)

## 2020-07-19 PROCEDURE — 2580000003 HC RX 258: Performed by: PHYSICIAN ASSISTANT

## 2020-07-19 PROCEDURE — 86850 RBC ANTIBODY SCREEN: CPT

## 2020-07-19 PROCEDURE — 6360000002 HC RX W HCPCS: Performed by: INTERNAL MEDICINE

## 2020-07-19 PROCEDURE — 6360000004 HC RX CONTRAST MEDICATION: Performed by: INTERNAL MEDICINE

## 2020-07-19 PROCEDURE — 99232 SBSQ HOSP IP/OBS MODERATE 35: CPT | Performed by: PSYCHIATRY & NEUROLOGY

## 2020-07-19 PROCEDURE — 70450 CT HEAD/BRAIN W/O DYE: CPT

## 2020-07-19 PROCEDURE — 6370000000 HC RX 637 (ALT 250 FOR IP): Performed by: PHYSICIAN ASSISTANT

## 2020-07-19 PROCEDURE — 80048 BASIC METABOLIC PNL TOTAL CA: CPT

## 2020-07-19 PROCEDURE — 85027 COMPLETE CBC AUTOMATED: CPT

## 2020-07-19 PROCEDURE — 85610 PROTHROMBIN TIME: CPT

## 2020-07-19 PROCEDURE — 36415 COLL VENOUS BLD VENIPUNCTURE: CPT

## 2020-07-19 PROCEDURE — 86900 BLOOD TYPING SEROLOGIC ABO: CPT

## 2020-07-19 PROCEDURE — 70498 CT ANGIOGRAPHY NECK: CPT

## 2020-07-19 PROCEDURE — 86901 BLOOD TYPING SEROLOGIC RH(D): CPT

## 2020-07-19 PROCEDURE — 6370000000 HC RX 637 (ALT 250 FOR IP): Performed by: INTERNAL MEDICINE

## 2020-07-19 PROCEDURE — 93005 ELECTROCARDIOGRAM TRACING: CPT | Performed by: INTERNAL MEDICINE

## 2020-07-19 PROCEDURE — 2100000000 HC CCU R&B

## 2020-07-19 PROCEDURE — 84484 ASSAY OF TROPONIN QUANT: CPT

## 2020-07-19 PROCEDURE — 6360000002 HC RX W HCPCS: Performed by: NURSE PRACTITIONER

## 2020-07-19 RX ORDER — MORPHINE SULFATE 2 MG/ML
INJECTION, SOLUTION INTRAMUSCULAR; INTRAVENOUS
Status: DISPENSED
Start: 2020-07-19 | End: 2020-07-20

## 2020-07-19 RX ORDER — CLINDAMYCIN PHOSPHATE 900 MG/50ML
900 INJECTION INTRAVENOUS
Status: DISCONTINUED | OUTPATIENT
Start: 2020-07-20 | End: 2020-07-20

## 2020-07-19 RX ORDER — MORPHINE SULFATE 2 MG/ML
2 INJECTION, SOLUTION INTRAMUSCULAR; INTRAVENOUS ONCE
Status: COMPLETED | OUTPATIENT
Start: 2020-07-19 | End: 2020-07-19

## 2020-07-19 RX ORDER — SODIUM CHLORIDE 0.9 % (FLUSH) 0.9 %
10 SYRINGE (ML) INJECTION EVERY 12 HOURS SCHEDULED
Status: DISCONTINUED | OUTPATIENT
Start: 2020-07-19 | End: 2020-07-20

## 2020-07-19 RX ORDER — NITROGLYCERIN 0.4 MG/1
TABLET SUBLINGUAL
Status: DISPENSED
Start: 2020-07-19 | End: 2020-07-20

## 2020-07-19 RX ORDER — SODIUM CHLORIDE 0.9 % (FLUSH) 0.9 %
10 SYRINGE (ML) INJECTION PRN
Status: DISCONTINUED | OUTPATIENT
Start: 2020-07-19 | End: 2020-07-20

## 2020-07-19 RX ORDER — NITROGLYCERIN 0.4 MG/1
0.4 TABLET SUBLINGUAL EVERY 5 MIN PRN
Status: DISCONTINUED | OUTPATIENT
Start: 2020-07-19 | End: 2020-07-21 | Stop reason: HOSPADM

## 2020-07-19 RX ADMIN — PANTOPRAZOLE SODIUM 40 MG: 40 TABLET, DELAYED RELEASE ORAL at 16:25

## 2020-07-19 RX ADMIN — KETOROLAC TROMETHAMINE 30 MG: 30 INJECTION, SOLUTION INTRAMUSCULAR at 16:25

## 2020-07-19 RX ADMIN — ATORVASTATIN CALCIUM 10 MG: 10 TABLET, FILM COATED ORAL at 21:05

## 2020-07-19 RX ADMIN — MIDODRINE HYDROCHLORIDE 2.5 MG: 5 TABLET ORAL at 16:25

## 2020-07-19 RX ADMIN — TIZANIDINE 4 MG: 4 TABLET ORAL at 14:29

## 2020-07-19 RX ADMIN — MIDODRINE HYDROCHLORIDE 2.5 MG: 5 TABLET ORAL at 09:11

## 2020-07-19 RX ADMIN — CLOPIDOGREL 75 MG: 75 TABLET, FILM COATED ORAL at 09:11

## 2020-07-19 RX ADMIN — MORPHINE SULFATE 2 MG: 2 INJECTION, SOLUTION INTRAMUSCULAR; INTRAVENOUS at 19:22

## 2020-07-19 RX ADMIN — TRAZODONE HYDROCHLORIDE 150 MG: 50 TABLET ORAL at 21:06

## 2020-07-19 RX ADMIN — TIZANIDINE 4 MG: 4 TABLET ORAL at 09:11

## 2020-07-19 RX ADMIN — MORPHINE SULFATE 2 MG: 2 INJECTION, SOLUTION INTRAMUSCULAR; INTRAVENOUS at 09:20

## 2020-07-19 RX ADMIN — Medication 10 ML: at 09:15

## 2020-07-19 RX ADMIN — IOPAMIDOL 75 ML: 755 INJECTION, SOLUTION INTRAVENOUS at 19:38

## 2020-07-19 RX ADMIN — TIZANIDINE 4 MG: 4 TABLET ORAL at 21:05

## 2020-07-19 RX ADMIN — ASPIRIN 325 MG ORAL TABLET 325 MG: 325 PILL ORAL at 09:11

## 2020-07-19 RX ADMIN — ESCITALOPRAM OXALATE 10 MG: 10 TABLET ORAL at 09:11

## 2020-07-19 RX ADMIN — ALPRAZOLAM 0.5 MG: 0.5 TABLET ORAL at 21:06

## 2020-07-19 RX ADMIN — PANTOPRAZOLE SODIUM 40 MG: 40 TABLET, DELAYED RELEASE ORAL at 05:41

## 2020-07-19 RX ADMIN — FLUTICASONE PROPIONATE 1 SPRAY: 50 SPRAY, METERED NASAL at 09:14

## 2020-07-19 RX ADMIN — NITROGLYCERIN 0.4 MG: 0.4 TABLET, ORALLY DISINTEGRATING SUBLINGUAL at 19:17

## 2020-07-19 ASSESSMENT — PAIN DESCRIPTION - LOCATION
LOCATION: HEAD

## 2020-07-19 ASSESSMENT — PAIN DESCRIPTION - PAIN TYPE
TYPE: ACUTE PAIN

## 2020-07-19 ASSESSMENT — PAIN DESCRIPTION - ONSET
ONSET: GRADUAL
ONSET: ON-GOING

## 2020-07-19 ASSESSMENT — ENCOUNTER SYMPTOMS
CONSTIPATION: 0
EYE DISCHARGE: 0
COUGH: 0
ABDOMINAL PAIN: 0
DIARRHEA: 0
VOMITING: 0
FACIAL SWELLING: 0
EYE REDNESS: 0
NAUSEA: 0
PHOTOPHOBIA: 0
SHORTNESS OF BREATH: 0
CHEST TIGHTNESS: 0
ABDOMINAL DISTENTION: 0
BLOOD IN STOOL: 0

## 2020-07-19 ASSESSMENT — PAIN DESCRIPTION - FREQUENCY
FREQUENCY: CONTINUOUS
FREQUENCY: INTERMITTENT
FREQUENCY: INTERMITTENT

## 2020-07-19 ASSESSMENT — PAIN DESCRIPTION - DESCRIPTORS
DESCRIPTORS: THROBBING
DESCRIPTORS: POUNDING
DESCRIPTORS: THROBBING

## 2020-07-19 ASSESSMENT — PAIN SCALES - GENERAL
PAINLEVEL_OUTOF10: 9
PAINLEVEL_OUTOF10: 0
PAINLEVEL_OUTOF10: 8
PAINLEVEL_OUTOF10: 10
PAINLEVEL_OUTOF10: 9
PAINLEVEL_OUTOF10: 3
PAINLEVEL_OUTOF10: 5

## 2020-07-19 ASSESSMENT — PAIN - FUNCTIONAL ASSESSMENT: PAIN_FUNCTIONAL_ASSESSMENT: ACTIVITIES ARE NOT PREVENTED

## 2020-07-19 ASSESSMENT — PAIN DESCRIPTION - ORIENTATION
ORIENTATION: LEFT
ORIENTATION: RIGHT;LEFT

## 2020-07-19 NOTE — PROGRESS NOTES
Stroke alert called around 7:10 PM.  Evaluated patient at bedside, complaint of chest pain, left facial numbness, left upper extremity numbness, left lower extremity numbness, left lower extremity weakness. She also complained of headache. Bedside EKG reviewed, normal sinus rhythm with no acute ST/T changes. Systolic blood pressure in the 140s to 120s. On evaluation, she does have left lower extremity weakness. She received aspirin 325 mg, Plavix 75 mg this morning. She has known left ICA stenosis, scheduled to undergo revascularization on 7/20/2020. No improvement of chest pain with sublingual nitroglycerin. Ordered morphine 2 mg IV x1 dose. Obtain serial troponin. Upon further discussion with patient, no recent surgery hemorrhage; no acute contraindication for TPA. Discussed with stroke team, recommended CT-head without contrast, CTA-head/neck with contrast.  Following CT result, will discussed with stroke team.  Patient may be getting TPA. Plan is to transfer to CVU from radiology department.   SIGNED: Sudha Bruno MD . 7/19/2020

## 2020-07-19 NOTE — PROGRESS NOTES
1906--Pt called and c/o \"horrible\" headache and chest pain described as tightening. Pt denied radiating pain to arm, nausea, dizziness, SOB. Denied ever having experienced this before. NIHSS assessed-- numbness L face, L arm and L leg. Weakness L leg (drift). NIHSS calculated as 2. Stroke alert called. Ordered- nitro SL (no CP relief), morphine 2mg x1. STAT EKG- showed NSR. CT head and neck STAT- CT aware, pt taken down at 1930 with [facility] and Clinical admin. Trop stat, then 4 hours. New IV 22G placed L arm. Stroke team was called and spoke with Dr. Synetta Libman who responded to stroke alert. Pt planned to be transferred to CVU pending CT results.

## 2020-07-19 NOTE — PROGRESS NOTES
oz (61.1 kg)       BP Readings from Last 3 Encounters:   07/19/20 115/75   06/23/20 90/68     Chest- clear  Heart-regular  Abd-soft  Ext- no edema    Labs  Hemoglobin   Date Value Ref Range Status   07/19/2020 12.2 12.0 - 16.0 g/dL Final     Hematocrit   Date Value Ref Range Status   07/19/2020 36.8 36.0 - 48.0 % Final     WBC   Date Value Ref Range Status   07/19/2020 4.5 4.0 - 11.0 K/uL Final     Platelets   Date Value Ref Range Status   07/19/2020 217 135 - 450 K/uL Final     Lab Results   Component Value Date    CREATININE 0.9 07/19/2020    BUN 15 07/19/2020     07/19/2020    K 4.4 07/19/2020     07/19/2020    CO2 22 07/19/2020   UOsm 206  Saranya less than 20  TSH WNL    Assessment/Plan:  1. Hyponatremia, unclear duration. At goal.  Needed D5W to slow down correction. Etiology probably secondary to decreased salt intake plus hypotension plus HCTZ. Rate of correction now adequate. 2.  Hypotension. Hold off on antihypertensives. Better. Decreased Midodrine. 3.  Renal function is within normal limits. 4.  History of severe left ICA stenosis- Vascular surgery following. For CEA Monday. 5.  History of hepatitis C. Normal renal function. UA only showed trace blood  6. Currently on MIdodrine  7. Headache- w/u per Medicine and Neurology  8. Stable for surgery from renal perspective.      Benjamin Pickett MD

## 2020-07-19 NOTE — PROGRESS NOTES
NEUROLOGY FOLLOWUP    HISTORY OF PRESENT ILLNESS :     Tristian Webb is a 62 y.o. female   History was obtained from the patient and dictations in the chart. Patient has not had any further episodes of confusion or disorientation. She had a headache this morning but had some pain medication and now she feels much better. She has had 2 episodes of amaurosis fugax in the last few weeks and was found to have a severe left internal carotid artery stenosis. Patient is scheduled for left carotid endarterectomy on Monday. She denies any further neurological symptoms. REVIEW OF SYSTEMS       Constitutional:  []   Chills   []  Fatigue   []  Fevers   []  Malaise   []  Weight loss     [x] Denies all of the above    Respiratory:   []  Cough    []  Shortness of breath         [x] Denies all of the above     Cardiovascular:   []  Chest pain    []  Exertional chest pressure/discomfort           [] Palpitations    []  Syncope     [x] Denies all of the above    Past Medical History:   Diagnosis Date    Anxiety     Arthritis     Hepatitis C     Hyperlipidemia     Hypertension     Reflux esophagitis      History reviewed. No pertinent family history. Social History     Socioeconomic History    Marital status:       Spouse name: None    Number of children: None    Years of education: None    Highest education level: None   Occupational History    None   Social Needs    Financial resource strain: None    Food insecurity     Worry: None     Inability: None    Transportation needs     Medical: None     Non-medical: None   Tobacco Use    Smoking status: Current Every Day Smoker     Packs/day: 0.50     Types: Cigarettes    Smokeless tobacco: Never Used   Substance and Sexual Activity    Alcohol use: Yes     Frequency: 2-3 times a week     Comment: soc    Drug use: Never    Sexual activity: None   Lifestyle    Physical activity     Days per week: None     Minutes per session: None    Stress: None   Relationships    Social connections     Talks on phone: None     Gets together: None     Attends Orthodoxy service: None     Active member of club or organization: None     Attends meetings of clubs or organizations: None     Relationship status: None    Intimate partner violence     Fear of current or ex partner: None     Emotionally abused: None     Physically abused: None     Forced sexual activity: None   Other Topics Concern    None   Social History Narrative    None        PHYSICAL EXAMINATION      /75   Pulse 97   Temp 98 °F (36.7 °C) (Oral)   Resp 16   Ht 5' 3\" (1.6 m)   Wt 127 lb 4.8 oz (57.7 kg)   SpO2 99%   BMI 22.55 kg/m²   This is a well-nourished patient in no acute distress  Patient is awake, alert and oriented x3. Speech is normal.  Pupils are equal round reacting to light. Extraocular movements intact. Face symmetrical. Tongue midline. Motor exam shows normal symmetrical strength. Deep tendon reflexes normal. Plantar reflexes downgoing. Sensory exam normal. Coordination normal. Gait normal. No carotid bruit. No neck stiffness. DATA :  LABS:  General Labs:    CBC:   Lab Results   Component Value Date    WBC 4.5 07/19/2020    RBC 4.01 07/19/2020    HGB 12.2 07/19/2020    HCT 36.8 07/19/2020    MCV 91.7 07/19/2020    MCH 30.4 07/19/2020    MCHC 33.1 07/19/2020    RDW 13.9 07/19/2020     07/19/2020    MPV 6.8 07/19/2020     BMP:    Lab Results   Component Value Date     07/19/2020    K 4.4 07/19/2020    K 4.2 07/15/2020     07/19/2020    CO2 22 07/19/2020    BUN 15 07/19/2020    CREATININE 0.9 07/19/2020    CALCIUM 9.4 07/19/2020    GFRAA >60 07/19/2020    LABGLOM >60 07/19/2020    GLUCOSE 86 07/19/2020     RADIOLOGY REVIEW:  I have reviewed radiology image(s) and reports(s) of: MRI brain      IMPRESSION :  TIA  Severe left internal carotid artery stenosis  Headache,?   Muscle

## 2020-07-19 NOTE — PROGRESS NOTES
Hospitalist Progress Note    Patient:  Carter Luong  Unit/Bed:[id number]-[id number]/3368-01   YOB: 1960       MRN: [id number] Acct: [de-identified]  PCP: Gayle Jacobs, ROOSEVELT-CNP, ROOSEVELT - NP    Date of Admission: 7/14/2020  --------------------------    Chief Complaint:      headache    Hospital Course:     Carter Luong is a 62 y.o. female hospitalized on 7/14/2020 for headache, find to have severe hyponatremia. Known history of left internal carotid artery stenosis requiring endarterectomy    During her hospital was noted to have severe hypotension, improved with midodrine and stopping her multiple antihypertensive agent. Assessment:       1. Moderate to severe hyponatremia, improving  2. Hypotension, hold all blood pressure medication including hydrochlorothiazide, currently on Midodrine   3. Headache, evaluated by neurology, no large territorial infarct or bleeding. Resolved   4. symptomatic left internal carotid artery occlusion/TIA  5. Transient episode of confusion of undetermined etiology. Returned to baseline. No focal neurological deficit.       comorbidities:    · Hep C  · Tobacco dependence  ·     Plan:  1. Sodium level at goal.  [address] nephrology team.  2. Vascular team planning for carotid endarterectomy tomorrow. 3. Continue holding all antihypertensive agent. Will consider weaning Midodrine   4. Code Status: Full Code         DVT prophylaxis: SCD       Disposition: Likely home on Tuesday    I discussed my thought processes at length with patient/family and patient understood. Question and concerns  Addressed      Discussed with RN      ----------------      Subjective:     Patient seen and examined  Overnight events noted  RN and ancillary staff note reviewed    No complain today , intermittent headache but overall improving.          Diet: DIET GENERAL;  Diet NPO, After Midnight Exceptions are: Sips with Meds    OBJECTIVE     Exam:  /75   Pulse 97 Temp 98 °F (36.7 °C) (Oral)   Resp 16   Ht 5' 3\" (1.6 m)   Wt 127 lb 4.8 oz (57.7 kg)   SpO2 99%   BMI 22.55 kg/m²            Gen: Not in distress. Alert. Head: Normocephalic. Atraumatic. Eyes: Conjunctivae/corneas clear. ENT: Oral mucosa moist  Neck: No JVD. No obvious thyromegaly. CVS: Nml S1S2, no murmur  , RRR  Pulmomary: Clear bilaterally. No crackles. No wheezes. Gastrointestinal: Soft, non tender, non distend, . Musculoskeletal: No edema. Warm  Neuro: No focal deficit. Moves extremity spontaneously. Psychiatry: Appropriate affect. Not agitated.     Medications:  Reviewed    Infusion Medications    [START ON 7/20/2020] sodium chloride       Scheduled Medications    [START ON 7/20/2020] ceFAZolin  2 g Intravenous On Call to OR    midodrine  2.5 mg Oral BID WC    nicotine  1 patch Transdermal Daily    escitalopram  10 mg Oral Daily    fluticasone  1 spray Each Nostril Daily    pantoprazole  40 mg Oral BID AC    atorvastatin  10 mg Oral Nightly    tiZANidine  4 mg Oral TID    traZODone  150 mg Oral Nightly    sodium chloride flush  10 mL Intravenous 2 times per day    aspirin  325 mg Oral Daily    clopidogrel  75 mg Oral Daily     PRN Meds: morphine, oxyCODONE-acetaminophen, perflutren lipid microspheres, ketorolac, ALPRAZolam, sodium chloride flush, acetaminophen **OR** acetaminophen, magnesium hydroxide, promethazine **OR** ondansetron, potassium chloride **OR** potassium alternative oral replacement **OR** potassium chloride      Intake/Output Summary (Last 24 hours) at 7/19/2020 0932  Last data filed at 7/19/2020 0932  Gross per 24 hour   Intake 730 ml   Output 1100 ml   Net -370 ml             Labs:   Recent Labs     07/17/20 0448 07/18/20  0502 07/19/20  0517   WBC 4.2 4.6 4.5   HGB 11.9* 13.1 12.2   HCT 35.4* 39.0 36.8    210 217     Recent Labs     07/17/20  0448 07/18/20  0502 07/19/20  0517   * 134* 140   K 4.0 4.0 4.4   CL 99 103 108   CO2 22 17* 22   BUN 13 11 15

## 2020-07-20 ENCOUNTER — ANESTHESIA (OUTPATIENT)
Dept: OPERATING ROOM | Age: 60
DRG: 629 | End: 2020-07-20
Payer: COMMERCIAL

## 2020-07-20 ENCOUNTER — ANESTHESIA EVENT (OUTPATIENT)
Dept: OPERATING ROOM | Age: 60
DRG: 629 | End: 2020-07-20
Payer: COMMERCIAL

## 2020-07-20 VITALS
TEMPERATURE: 94.5 F | SYSTOLIC BLOOD PRESSURE: 174 MMHG | DIASTOLIC BLOOD PRESSURE: 79 MMHG | RESPIRATION RATE: 22 BRPM | OXYGEN SATURATION: 100 %

## 2020-07-20 LAB
EKG ATRIAL RATE: 93 BPM
EKG DIAGNOSIS: NORMAL
EKG P AXIS: 59 DEGREES
EKG P-R INTERVAL: 120 MS
EKG Q-T INTERVAL: 370 MS
EKG QRS DURATION: 72 MS
EKG QTC CALCULATION (BAZETT): 460 MS
EKG R AXIS: 48 DEGREES
EKG T AXIS: 44 DEGREES
EKG VENTRICULAR RATE: 93 BPM
TROPONIN: <0.01 NG/ML

## 2020-07-20 PROCEDURE — 2500000003 HC RX 250 WO HCPCS: Performed by: SURGERY

## 2020-07-20 PROCEDURE — 03CJ0ZZ EXTIRPATION OF MATTER FROM LEFT COMMON CAROTID ARTERY, OPEN APPROACH: ICD-10-PCS | Performed by: SURGERY

## 2020-07-20 PROCEDURE — 6370000000 HC RX 637 (ALT 250 FOR IP): Performed by: SURGERY

## 2020-07-20 PROCEDURE — 3700000000 HC ANESTHESIA ATTENDED CARE: Performed by: SURGERY

## 2020-07-20 PROCEDURE — 2720000010 HC SURG SUPPLY STERILE: Performed by: SURGERY

## 2020-07-20 PROCEDURE — 03CL0ZZ EXTIRPATION OF MATTER FROM LEFT INTERNAL CAROTID ARTERY, OPEN APPROACH: ICD-10-PCS | Performed by: SURGERY

## 2020-07-20 PROCEDURE — 6360000002 HC RX W HCPCS: Performed by: NURSE ANESTHETIST, CERTIFIED REGISTERED

## 2020-07-20 PROCEDURE — 03UL0KZ SUPPLEMENT LEFT INTERNAL CAROTID ARTERY WITH NONAUTOLOGOUS TISSUE SUBSTITUTE, OPEN APPROACH: ICD-10-PCS | Performed by: SURGERY

## 2020-07-20 PROCEDURE — 6360000002 HC RX W HCPCS: Performed by: ANESTHESIOLOGY

## 2020-07-20 PROCEDURE — 2580000003 HC RX 258: Performed by: NURSE PRACTITIONER

## 2020-07-20 PROCEDURE — 84484 ASSAY OF TROPONIN QUANT: CPT

## 2020-07-20 PROCEDURE — 2580000003 HC RX 258: Performed by: NURSE ANESTHETIST, CERTIFIED REGISTERED

## 2020-07-20 PROCEDURE — 2709999900 HC NON-CHARGEABLE SUPPLY: Performed by: SURGERY

## 2020-07-20 PROCEDURE — 6360000002 HC RX W HCPCS: Performed by: SURGERY

## 2020-07-20 PROCEDURE — 6370000000 HC RX 637 (ALT 250 FOR IP)

## 2020-07-20 PROCEDURE — 94150 VITAL CAPACITY TEST: CPT

## 2020-07-20 PROCEDURE — 6360000002 HC RX W HCPCS: Performed by: NURSE PRACTITIONER

## 2020-07-20 PROCEDURE — 2500000003 HC RX 250 WO HCPCS: Performed by: NURSE ANESTHETIST, CERTIFIED REGISTERED

## 2020-07-20 PROCEDURE — 3600000004 HC SURGERY LEVEL 4 BASE: Performed by: SURGERY

## 2020-07-20 PROCEDURE — 03UJ0KZ SUPPLEMENT LEFT COMMON CAROTID ARTERY WITH NONAUTOLOGOUS TISSUE SUBSTITUTE, OPEN APPROACH: ICD-10-PCS | Performed by: SURGERY

## 2020-07-20 PROCEDURE — C1768 GRAFT, VASCULAR: HCPCS | Performed by: SURGERY

## 2020-07-20 PROCEDURE — 2580000003 HC RX 258: Performed by: SURGERY

## 2020-07-20 PROCEDURE — 3700000001 HC ADD 15 MINUTES (ANESTHESIA): Performed by: SURGERY

## 2020-07-20 PROCEDURE — 03UN0KZ SUPPLEMENT LEFT EXTERNAL CAROTID ARTERY WITH NONAUTOLOGOUS TISSUE SUBSTITUTE, OPEN APPROACH: ICD-10-PCS | Performed by: SURGERY

## 2020-07-20 PROCEDURE — 03CN0ZZ EXTIRPATION OF MATTER FROM LEFT EXTERNAL CAROTID ARTERY, OPEN APPROACH: ICD-10-PCS | Performed by: SURGERY

## 2020-07-20 PROCEDURE — 6370000000 HC RX 637 (ALT 250 FOR IP): Performed by: NURSE ANESTHETIST, CERTIFIED REGISTERED

## 2020-07-20 PROCEDURE — 88304 TISSUE EXAM BY PATHOLOGIST: CPT

## 2020-07-20 PROCEDURE — 99232 SBSQ HOSP IP/OBS MODERATE 35: CPT | Performed by: PSYCHIATRY & NEUROLOGY

## 2020-07-20 PROCEDURE — 35301 RECHANNELING OF ARTERY: CPT | Performed by: SURGERY

## 2020-07-20 PROCEDURE — 3600000014 HC SURGERY LEVEL 4 ADDTL 15MIN: Performed by: SURGERY

## 2020-07-20 PROCEDURE — 2140000000 HC CCU INTERMEDIATE R&B

## 2020-07-20 PROCEDURE — 7100000001 HC PACU RECOVERY - ADDTL 15 MIN: Performed by: SURGERY

## 2020-07-20 PROCEDURE — 93010 ELECTROCARDIOGRAM REPORT: CPT | Performed by: INTERNAL MEDICINE

## 2020-07-20 PROCEDURE — 7100000000 HC PACU RECOVERY - FIRST 15 MIN: Performed by: SURGERY

## 2020-07-20 PROCEDURE — 6360000002 HC RX W HCPCS

## 2020-07-20 DEVICE — PATCH VASC W0.8XL8CM PERIPH BOV PERICARD N PVC N DEHP CRSS: Type: IMPLANTABLE DEVICE | Status: FUNCTIONAL

## 2020-07-20 RX ORDER — SODIUM CHLORIDE 9 MG/ML
INJECTION, SOLUTION INTRAVENOUS CONTINUOUS PRN
Status: DISCONTINUED | OUTPATIENT
Start: 2020-07-20 | End: 2020-07-20 | Stop reason: SDUPTHER

## 2020-07-20 RX ORDER — ONDANSETRON 2 MG/ML
4 INJECTION INTRAMUSCULAR; INTRAVENOUS EVERY 6 HOURS PRN
Status: DISCONTINUED | OUTPATIENT
Start: 2020-07-20 | End: 2020-07-21 | Stop reason: HOSPADM

## 2020-07-20 RX ORDER — HYDROMORPHONE HCL 110MG/55ML
0.25 PATIENT CONTROLLED ANALGESIA SYRINGE INTRAVENOUS EVERY 5 MIN PRN
Status: DISCONTINUED | OUTPATIENT
Start: 2020-07-20 | End: 2020-07-20 | Stop reason: HOSPADM

## 2020-07-20 RX ORDER — LIDOCAINE HYDROCHLORIDE 40 MG/ML
SOLUTION TOPICAL PRN
Status: DISCONTINUED | OUTPATIENT
Start: 2020-07-20 | End: 2020-07-20 | Stop reason: SDUPTHER

## 2020-07-20 RX ORDER — ROCURONIUM BROMIDE 10 MG/ML
INJECTION, SOLUTION INTRAVENOUS PRN
Status: DISCONTINUED | OUTPATIENT
Start: 2020-07-20 | End: 2020-07-20 | Stop reason: SDUPTHER

## 2020-07-20 RX ORDER — SUCCINYLCHOLINE CHLORIDE 20 MG/ML
INJECTION INTRAMUSCULAR; INTRAVENOUS PRN
Status: DISCONTINUED | OUTPATIENT
Start: 2020-07-20 | End: 2020-07-20 | Stop reason: SDUPTHER

## 2020-07-20 RX ORDER — PROPOFOL 10 MG/ML
INJECTION, EMULSION INTRAVENOUS PRN
Status: DISCONTINUED | OUTPATIENT
Start: 2020-07-20 | End: 2020-07-20 | Stop reason: SDUPTHER

## 2020-07-20 RX ORDER — ONDANSETRON 2 MG/ML
4 INJECTION INTRAMUSCULAR; INTRAVENOUS
Status: DISCONTINUED | OUTPATIENT
Start: 2020-07-20 | End: 2020-07-20 | Stop reason: HOSPADM

## 2020-07-20 RX ORDER — CLINDAMYCIN PHOSPHATE 900 MG/50ML
900 INJECTION INTRAVENOUS EVERY 6 HOURS
Status: COMPLETED | OUTPATIENT
Start: 2020-07-20 | End: 2020-07-20

## 2020-07-20 RX ORDER — ACETAMINOPHEN 325 MG/1
650 TABLET ORAL EVERY 4 HOURS PRN
Status: DISCONTINUED | OUTPATIENT
Start: 2020-07-20 | End: 2020-07-21 | Stop reason: HOSPADM

## 2020-07-20 RX ORDER — PROMETHAZINE HYDROCHLORIDE 25 MG/1
12.5 TABLET ORAL EVERY 6 HOURS PRN
Status: DISCONTINUED | OUTPATIENT
Start: 2020-07-20 | End: 2020-07-21 | Stop reason: HOSPADM

## 2020-07-20 RX ORDER — HYDROMORPHONE HCL 110MG/55ML
0.5 PATIENT CONTROLLED ANALGESIA SYRINGE INTRAVENOUS EVERY 5 MIN PRN
Status: DISCONTINUED | OUTPATIENT
Start: 2020-07-20 | End: 2020-07-20 | Stop reason: HOSPADM

## 2020-07-20 RX ORDER — SODIUM CHLORIDE 0.9 % (FLUSH) 0.9 %
10 SYRINGE (ML) INJECTION EVERY 12 HOURS SCHEDULED
Status: DISCONTINUED | OUTPATIENT
Start: 2020-07-20 | End: 2020-07-21 | Stop reason: HOSPADM

## 2020-07-20 RX ORDER — MORPHINE SULFATE 2 MG/ML
2 INJECTION, SOLUTION INTRAMUSCULAR; INTRAVENOUS
Status: DISCONTINUED | OUTPATIENT
Start: 2020-07-20 | End: 2020-07-21 | Stop reason: HOSPADM

## 2020-07-20 RX ORDER — GLYCOPYRROLATE 0.2 MG/ML
INJECTION INTRAMUSCULAR; INTRAVENOUS PRN
Status: DISCONTINUED | OUTPATIENT
Start: 2020-07-20 | End: 2020-07-20 | Stop reason: SDUPTHER

## 2020-07-20 RX ORDER — HYDRALAZINE HYDROCHLORIDE 20 MG/ML
10 INJECTION INTRAMUSCULAR; INTRAVENOUS
Status: DISCONTINUED | OUTPATIENT
Start: 2020-07-20 | End: 2020-07-21 | Stop reason: HOSPADM

## 2020-07-20 RX ORDER — PROTAMINE SULFATE 10 MG/ML
INJECTION, SOLUTION INTRAVENOUS PRN
Status: DISCONTINUED | OUTPATIENT
Start: 2020-07-20 | End: 2020-07-20 | Stop reason: SDUPTHER

## 2020-07-20 RX ORDER — LABETALOL HYDROCHLORIDE 5 MG/ML
5 INJECTION, SOLUTION INTRAVENOUS EVERY 10 MIN PRN
Status: DISCONTINUED | OUTPATIENT
Start: 2020-07-20 | End: 2020-07-20 | Stop reason: HOSPADM

## 2020-07-20 RX ORDER — FENTANYL CITRATE 50 UG/ML
50 INJECTION, SOLUTION INTRAMUSCULAR; INTRAVENOUS EVERY 5 MIN PRN
Status: DISCONTINUED | OUTPATIENT
Start: 2020-07-20 | End: 2020-07-20 | Stop reason: HOSPADM

## 2020-07-20 RX ORDER — LABETALOL HYDROCHLORIDE 5 MG/ML
10 INJECTION, SOLUTION INTRAVENOUS
Status: DISCONTINUED | OUTPATIENT
Start: 2020-07-20 | End: 2020-07-21 | Stop reason: HOSPADM

## 2020-07-20 RX ORDER — FENTANYL CITRATE 50 UG/ML
INJECTION, SOLUTION INTRAMUSCULAR; INTRAVENOUS PRN
Status: DISCONTINUED | OUTPATIENT
Start: 2020-07-20 | End: 2020-07-20 | Stop reason: SDUPTHER

## 2020-07-20 RX ORDER — FENTANYL CITRATE 50 UG/ML
25 INJECTION, SOLUTION INTRAMUSCULAR; INTRAVENOUS EVERY 5 MIN PRN
Status: DISCONTINUED | OUTPATIENT
Start: 2020-07-20 | End: 2020-07-20 | Stop reason: HOSPADM

## 2020-07-20 RX ORDER — SODIUM CHLORIDE, SODIUM LACTATE, POTASSIUM CHLORIDE, CALCIUM CHLORIDE 600; 310; 30; 20 MG/100ML; MG/100ML; MG/100ML; MG/100ML
INJECTION, SOLUTION INTRAVENOUS CONTINUOUS
Status: DISCONTINUED | OUTPATIENT
Start: 2020-07-20 | End: 2020-07-21

## 2020-07-20 RX ORDER — LIDOCAINE HYDROCHLORIDE 20 MG/ML
INJECTION, SOLUTION INFILTRATION; PERINEURAL PRN
Status: DISCONTINUED | OUTPATIENT
Start: 2020-07-20 | End: 2020-07-20 | Stop reason: SDUPTHER

## 2020-07-20 RX ORDER — DEXAMETHASONE SODIUM PHOSPHATE 4 MG/ML
INJECTION, SOLUTION INTRA-ARTICULAR; INTRALESIONAL; INTRAMUSCULAR; INTRAVENOUS; SOFT TISSUE PRN
Status: DISCONTINUED | OUTPATIENT
Start: 2020-07-20 | End: 2020-07-20 | Stop reason: SDUPTHER

## 2020-07-20 RX ORDER — SODIUM CHLORIDE 0.9 % (FLUSH) 0.9 %
10 SYRINGE (ML) INJECTION PRN
Status: DISCONTINUED | OUTPATIENT
Start: 2020-07-20 | End: 2020-07-21 | Stop reason: HOSPADM

## 2020-07-20 RX ORDER — HYDROMORPHONE HCL 110MG/55ML
PATIENT CONTROLLED ANALGESIA SYRINGE INTRAVENOUS
Status: COMPLETED
Start: 2020-07-20 | End: 2020-07-20

## 2020-07-20 RX ORDER — OXYCODONE HYDROCHLORIDE AND ACETAMINOPHEN 5; 325 MG/1; MG/1
1 TABLET ORAL EVERY 4 HOURS PRN
Status: DISCONTINUED | OUTPATIENT
Start: 2020-07-20 | End: 2020-07-21 | Stop reason: HOSPADM

## 2020-07-20 RX ORDER — ONDANSETRON 2 MG/ML
INJECTION INTRAMUSCULAR; INTRAVENOUS PRN
Status: DISCONTINUED | OUTPATIENT
Start: 2020-07-20 | End: 2020-07-20 | Stop reason: SDUPTHER

## 2020-07-20 RX ORDER — MAGNESIUM HYDROXIDE 1200 MG/15ML
LIQUID ORAL CONTINUOUS PRN
Status: COMPLETED | OUTPATIENT
Start: 2020-07-20 | End: 2020-07-20

## 2020-07-20 RX ORDER — HEPARIN SODIUM 1000 [USP'U]/ML
INJECTION, SOLUTION INTRAVENOUS; SUBCUTANEOUS PRN
Status: DISCONTINUED | OUTPATIENT
Start: 2020-07-20 | End: 2020-07-20 | Stop reason: SDUPTHER

## 2020-07-20 RX ADMIN — CLINDAMYCIN IN 5 PERCENT DEXTROSE 900 MG: 18 INJECTION, SOLUTION INTRAVENOUS at 16:52

## 2020-07-20 RX ADMIN — MIDODRINE HYDROCHLORIDE 2.5 MG: 5 TABLET ORAL at 20:08

## 2020-07-20 RX ADMIN — Medication 10 ML: at 12:54

## 2020-07-20 RX ADMIN — HYDROMORPHONE HYDROCHLORIDE 0.5 MG: 2 INJECTION, SOLUTION INTRAMUSCULAR; INTRAVENOUS; SUBCUTANEOUS at 10:09

## 2020-07-20 RX ADMIN — TIZANIDINE 4 MG: 4 TABLET ORAL at 12:50

## 2020-07-20 RX ADMIN — PROPOFOL 10 MG: 10 INJECTION, EMULSION INTRAVENOUS at 08:50

## 2020-07-20 RX ADMIN — HYDROMORPHONE HYDROCHLORIDE 0.5 MG: 2 INJECTION, SOLUTION INTRAMUSCULAR; INTRAVENOUS; SUBCUTANEOUS at 10:23

## 2020-07-20 RX ADMIN — PHENYLEPHRINE HYDROCHLORIDE 100 MCG/MIN: 10 INJECTION INTRAVENOUS at 13:40

## 2020-07-20 RX ADMIN — PROTAMINE SULFATE 30 MG: 10 INJECTION, SOLUTION INTRAVENOUS at 09:18

## 2020-07-20 RX ADMIN — PROPOFOL 20 MG: 10 INJECTION, EMULSION INTRAVENOUS at 08:05

## 2020-07-20 RX ADMIN — SODIUM CHLORIDE, POTASSIUM CHLORIDE, SODIUM LACTATE AND CALCIUM CHLORIDE: 600; 310; 30; 20 INJECTION, SOLUTION INTRAVENOUS at 11:37

## 2020-07-20 RX ADMIN — LIDOCAINE HYDROCHLORIDE 100 MG: 20 INJECTION, SOLUTION INFILTRATION; PERINEURAL at 07:52

## 2020-07-20 RX ADMIN — HEPARIN SODIUM 5000 UNITS: 1000 INJECTION INTRAVENOUS; SUBCUTANEOUS at 08:26

## 2020-07-20 RX ADMIN — SUCCINYLCHOLINE CHLORIDE 8 MG: 20 INJECTION, SOLUTION INTRAMUSCULAR; INTRAVENOUS at 07:54

## 2020-07-20 RX ADMIN — ONDANSETRON 4 MG: 2 INJECTION INTRAMUSCULAR; INTRAVENOUS at 08:06

## 2020-07-20 RX ADMIN — ALPRAZOLAM 0.5 MG: 0.5 TABLET ORAL at 21:24

## 2020-07-20 RX ADMIN — VANCOMYCIN HYDROCHLORIDE 1000 MG: 1 INJECTION, POWDER, LYOPHILIZED, FOR SOLUTION INTRAVENOUS at 07:40

## 2020-07-20 RX ADMIN — OXYCODONE HYDROCHLORIDE AND ACETAMINOPHEN 1 TABLET: 5; 325 TABLET ORAL at 17:10

## 2020-07-20 RX ADMIN — PHENYLEPHRINE HYDROCHLORIDE 100 MCG: 10 INJECTION INTRAVENOUS at 09:20

## 2020-07-20 RX ADMIN — LIDOCAINE HYDROCHLORIDE 4 ML: 40 SOLUTION TOPICAL at 07:55

## 2020-07-20 RX ADMIN — PROPOFOL 70 MG: 10 INJECTION, EMULSION INTRAVENOUS at 07:59

## 2020-07-20 RX ADMIN — PROPOFOL 10 MG: 10 INJECTION, EMULSION INTRAVENOUS at 08:34

## 2020-07-20 RX ADMIN — ESCITALOPRAM OXALATE 10 MG: 10 TABLET ORAL at 12:51

## 2020-07-20 RX ADMIN — OXYCODONE HYDROCHLORIDE AND ACETAMINOPHEN 1 TABLET: 5; 325 TABLET ORAL at 12:51

## 2020-07-20 RX ADMIN — PHENYLEPHRINE HYDROCHLORIDE 100 MCG: 10 INJECTION INTRAVENOUS at 08:00

## 2020-07-20 RX ADMIN — PROTAMINE SULFATE 20 MG: 10 INJECTION, SOLUTION INTRAVENOUS at 09:22

## 2020-07-20 RX ADMIN — TRAZODONE HYDROCHLORIDE 150 MG: 50 TABLET ORAL at 21:24

## 2020-07-20 RX ADMIN — ROCURONIUM BROMIDE 10 MG: 10 INJECTION, SOLUTION INTRAVENOUS at 08:07

## 2020-07-20 RX ADMIN — DEXAMETHASONE SODIUM PHOSPHATE 10 MG: 4 INJECTION, SOLUTION INTRAMUSCULAR; INTRAVENOUS at 08:06

## 2020-07-20 RX ADMIN — PROPOFOL 20 MG: 10 INJECTION, EMULSION INTRAVENOUS at 09:15

## 2020-07-20 RX ADMIN — FENTANYL CITRATE 25 MCG: 50 INJECTION, SOLUTION INTRAMUSCULAR; INTRAVENOUS at 07:52

## 2020-07-20 RX ADMIN — Medication 10 ML: at 21:26

## 2020-07-20 RX ADMIN — PHENYLEPHRINE HYDROCHLORIDE 100 MCG/MIN: 10 INJECTION INTRAVENOUS at 08:00

## 2020-07-20 RX ADMIN — PROPOFOL 70 MG: 10 INJECTION, EMULSION INTRAVENOUS at 07:53

## 2020-07-20 RX ADMIN — PROPOFOL 40 MG: 10 INJECTION, EMULSION INTRAVENOUS at 08:12

## 2020-07-20 RX ADMIN — SODIUM CHLORIDE: 9 INJECTION, SOLUTION INTRAVENOUS at 00:02

## 2020-07-20 RX ADMIN — OXYCODONE HYDROCHLORIDE AND ACETAMINOPHEN 1 TABLET: 5; 325 TABLET ORAL at 21:24

## 2020-07-20 RX ADMIN — PHENYLEPHRINE HYDROCHLORIDE 200 MCG: 10 INJECTION INTRAVENOUS at 08:43

## 2020-07-20 RX ADMIN — GLYCOPYRROLATE 0.2 MG: 0.2 INJECTION INTRAMUSCULAR; INTRAVENOUS at 08:05

## 2020-07-20 RX ADMIN — ATORVASTATIN CALCIUM 10 MG: 10 TABLET, FILM COATED ORAL at 21:24

## 2020-07-20 RX ADMIN — CLOPIDOGREL 75 MG: 75 TABLET, FILM COATED ORAL at 12:50

## 2020-07-20 RX ADMIN — SUGAMMADEX 100 MG: 100 INJECTION, SOLUTION INTRAVENOUS at 09:35

## 2020-07-20 RX ADMIN — HYDROMORPHONE HYDROCHLORIDE 0.5 MG: 2 INJECTION, SOLUTION INTRAMUSCULAR; INTRAVENOUS; SUBCUTANEOUS at 10:16

## 2020-07-20 RX ADMIN — PANTOPRAZOLE SODIUM 40 MG: 40 TABLET, DELAYED RELEASE ORAL at 17:10

## 2020-07-20 RX ADMIN — SODIUM CHLORIDE: 9 INJECTION, SOLUTION INTRAVENOUS at 07:40

## 2020-07-20 RX ADMIN — CLINDAMYCIN IN 5 PERCENT DEXTROSE 900 MG: 18 INJECTION, SOLUTION INTRAVENOUS at 12:55

## 2020-07-20 RX ADMIN — PROPOFOL 10 MG: 10 INJECTION, EMULSION INTRAVENOUS at 08:39

## 2020-07-20 RX ADMIN — SODIUM CHLORIDE: 9 INJECTION, SOLUTION INTRAVENOUS at 07:50

## 2020-07-20 RX ADMIN — GLYCOPYRROLATE 0.2 MG: 0.2 INJECTION INTRAMUSCULAR; INTRAVENOUS at 07:51

## 2020-07-20 RX ADMIN — PHENYLEPHRINE HYDROCHLORIDE 100 MCG: 10 INJECTION INTRAVENOUS at 08:04

## 2020-07-20 RX ADMIN — PHENYLEPHRINE HYDROCHLORIDE 10 MCG/MIN: 10 INJECTION INTRAVENOUS at 22:21

## 2020-07-20 RX ADMIN — TIZANIDINE 4 MG: 4 TABLET ORAL at 21:24

## 2020-07-20 RX ADMIN — Medication 10 ML: at 21:25

## 2020-07-20 RX ADMIN — ROCURONIUM BROMIDE 10 MG: 10 INJECTION, SOLUTION INTRAVENOUS at 07:51

## 2020-07-20 ASSESSMENT — PULMONARY FUNCTION TESTS
PIF_VALUE: 16
PIF_VALUE: 17
PIF_VALUE: 25
PIF_VALUE: 18
PIF_VALUE: 16
PIF_VALUE: 18
PIF_VALUE: 16
PIF_VALUE: 17
PIF_VALUE: 1
PIF_VALUE: 17
PIF_VALUE: 16
PIF_VALUE: 27
PIF_VALUE: 18
PIF_VALUE: 18
PIF_VALUE: 16
PIF_VALUE: 17
PIF_VALUE: 18
PIF_VALUE: 18
PIF_VALUE: 17
PIF_VALUE: 10
PIF_VALUE: 17
PIF_VALUE: 4
PIF_VALUE: 17
PIF_VALUE: 17
PIF_VALUE: 18
PIF_VALUE: 17
PIF_VALUE: 17
PIF_VALUE: 18
PIF_VALUE: 16
PIF_VALUE: 17
PIF_VALUE: 17
PIF_VALUE: 18
PIF_VALUE: 18
PIF_VALUE: 2
PIF_VALUE: 17
PIF_VALUE: 16
PIF_VALUE: 18
PIF_VALUE: 16
PIF_VALUE: 18
PIF_VALUE: 17
PIF_VALUE: 2
PIF_VALUE: 18
PIF_VALUE: 17
PIF_VALUE: 18
PIF_VALUE: 18
PIF_VALUE: 16
PIF_VALUE: 17
PIF_VALUE: 18
PIF_VALUE: 17
PIF_VALUE: 17
PIF_VALUE: 18
PIF_VALUE: 17
PIF_VALUE: 18
PIF_VALUE: 16
PIF_VALUE: 17
PIF_VALUE: 18
PIF_VALUE: 18
PIF_VALUE: 17
PIF_VALUE: 15
PIF_VALUE: 17
PIF_VALUE: 2
PIF_VALUE: 2
PIF_VALUE: 23
PIF_VALUE: 17
PIF_VALUE: 17
PIF_VALUE: 3
PIF_VALUE: 15
PIF_VALUE: 17
PIF_VALUE: 16
PIF_VALUE: 17
PIF_VALUE: 17
PIF_VALUE: 16
PIF_VALUE: 16
PIF_VALUE: 18
PIF_VALUE: 18
PIF_VALUE: 17
PIF_VALUE: 18
PIF_VALUE: 17
PIF_VALUE: 16
PIF_VALUE: 17
PIF_VALUE: 18
PIF_VALUE: 17
PIF_VALUE: 29
PIF_VALUE: 17
PIF_VALUE: 15
PIF_VALUE: 17
PIF_VALUE: 18
PIF_VALUE: 17
PIF_VALUE: 18
PIF_VALUE: 18
PIF_VALUE: 1
PIF_VALUE: 17
PIF_VALUE: 18
PIF_VALUE: 18
PIF_VALUE: 17
PIF_VALUE: 18
PIF_VALUE: 17
PIF_VALUE: 18
PIF_VALUE: 17
PIF_VALUE: 16
PIF_VALUE: 1
PIF_VALUE: 17
PIF_VALUE: 18

## 2020-07-20 ASSESSMENT — PAIN DESCRIPTION - PAIN TYPE
TYPE: SURGICAL PAIN
TYPE: SURGICAL PAIN

## 2020-07-20 ASSESSMENT — PAIN - FUNCTIONAL ASSESSMENT: PAIN_FUNCTIONAL_ASSESSMENT: PREVENTS OR INTERFERES SOME ACTIVE ACTIVITIES AND ADLS

## 2020-07-20 ASSESSMENT — PAIN SCALES - GENERAL
PAINLEVEL_OUTOF10: 0
PAINLEVEL_OUTOF10: 8
PAINLEVEL_OUTOF10: 3
PAINLEVEL_OUTOF10: 8
PAINLEVEL_OUTOF10: 10
PAINLEVEL_OUTOF10: 8

## 2020-07-20 ASSESSMENT — PAIN DESCRIPTION - ORIENTATION
ORIENTATION: LEFT
ORIENTATION: LEFT

## 2020-07-20 ASSESSMENT — PAIN DESCRIPTION - FREQUENCY: FREQUENCY: CONTINUOUS

## 2020-07-20 ASSESSMENT — PAIN DESCRIPTION - LOCATION
LOCATION: NECK
LOCATION: NECK

## 2020-07-20 ASSESSMENT — PAIN DESCRIPTION - DESCRIPTORS: DESCRIPTORS: THROBBING

## 2020-07-20 NOTE — FLOWSHEET NOTE
800 ml given as bolus, stopped at 11:30am. Jorden sent up from pharmacy but no need to administer after pts BP stabilized. Hanging on pump if needed. Bp 110/69 currently with cuff, A-line 122/67 and MAP of 88. Will monitor.  Electronically signed by Yannick Thompson RN on 7/20/2020 at 11:46 AM

## 2020-07-20 NOTE — ANESTHESIA PROCEDURE NOTES
Arterial Line:    An arterial line was placed using ultrasound guidance and surface landmarks, in the pre-op for the following indication(s): continuous blood pressure monitoring and blood sampling needed. A 20 gauge (size), 12 cm (length), Arrow (type) catheter was placed, Seldinger technique used, into the left brachial artery, secured by Tegaderm. Anesthesia type: Local  Local infiltration: Injection    Events:  patient tolerated procedure well with no complications and EBL > 5mL. Additional notes:  Right radial attempted sterilely several times without success and pressure dressing applied. No adverse seq. Artery noted to be very small on US.  7/20/2020 7:12 AM7/20/2020 7:37 AM  Anesthesiologist: Levon Vidales MD  Performed: Anesthesiologist   Preanesthetic Checklist  Completed: patient identified, site marked, surgical consent, pre-op evaluation, timeout performed, IV checked, risks and benefits discussed, monitors and equipment checked, anesthesia consent given, oxygen available and patient being monitored

## 2020-07-20 NOTE — ANESTHESIA POSTPROCEDURE EVALUATION
Department of Anesthesiology  Postprocedure Note    Patient: Virgen Wayne  MRN: [id number]  YOB: 1960  Date of evaluation: 7/20/2020  Time:  2:45 PM     Procedure Summary     Date:  07/20/20 Room / Location:  [address]    Anesthesia Start:  [address] Anesthesia Stop:  2746    Procedure:  LEFT CAROTID ENDARTERECTOMY WITH INTRAOPERATIVE DUPLEX SCAN (Left ) Diagnosis:  (LEFT CAROTID ARTERY STENOSIS I65.23)    Surgeon:  Anu Kwan MD Responsible Provider:  So Durham MD    Anesthesia Type:  general ASA Status:  3          Anesthesia Type: general    Ирина Phase I: Ирина Score: 10    Ирина Phase II:      Last vitals: Reviewed and per EMR flowsheets.        Anesthesia Post Evaluation    Level of consciousness: awake  Complications: no

## 2020-07-20 NOTE — PROGRESS NOTES
NEUROLOGY FOLLOWUP    HISTORY OF PRESENT ILLNESS :     Aleta Leonardo is a 62 y.o. female   History was obtained from the patient and dictations in the chart. Patient had left carotid endarterectomy earlier today and is doing well now without any new neurological symptoms. She has had 2 episodes of amaurosis fugax in the last few weeks and was found to have a severe left internal carotid artery stenosis. She denies any further neurological symptoms. REVIEW OF SYSTEMS       Constitutional:  []   Chills   []  Fatigue   []  Fevers   []  Malaise   []  Weight loss     [x] Denies all of the above    Respiratory:   []  Cough    []  Shortness of breath         [x] Denies all of the above     Cardiovascular:   []  Chest pain    []  Exertional chest pressure/discomfort           [] Palpitations    []  Syncope     [x] Denies all of the above    Past Medical History:   Diagnosis Date    Anxiety     Arthritis     Hepatitis C     Hyperlipidemia     Hypertension     Reflux esophagitis      History reviewed. No pertinent family history. Social History     Socioeconomic History    Marital status:       Spouse name: None    Number of children: None    Years of education: None    Highest education level: None   Occupational History    None   Social Needs    Financial resource strain: None    Food insecurity     Worry: None     Inability: None    Transportation needs     Medical: None     Non-medical: None   Tobacco Use    Smoking status: Current Every Day Smoker     Packs/day: 0.50     Types: Cigarettes    Smokeless tobacco: Never Used   Substance and Sexual Activity    Alcohol use: Yes     Frequency: 2-3 times a week     Comment: soc    Drug use: Never    Sexual activity: None   Lifestyle    Physical activity     Days per week: None     Minutes per session: None    Stress: None   Relationships    Social connections     Talks on phone: None     Gets together: None     Attends Mandaeism service: None     Active member of club or organization: None     Attends meetings of clubs or organizations: None     Relationship status: None    Intimate partner violence     Fear of current or ex partner: None     Emotionally abused: None     Physically abused: None     Forced sexual activity: None   Other Topics Concern    None   Social History Narrative    None        PHYSICAL EXAMINATION      /73   Pulse 87   Temp 97.6 °F (36.4 °C) (Temporal)   Resp 20   Ht 5' 3\" (1.6 m)   Wt 129 lb 6.6 oz (58.7 kg)   SpO2 98%   BMI 22.92 kg/m²   This is a well-nourished patient in no acute distress  Patient is awake, alert and oriented x3. Speech is normal.  Pupils are equal round reacting to light. Extraocular movements intact. Face symmetrical. Tongue midline. Motor exam shows normal symmetrical strength. Deep tendon reflexes normal. Plantar reflexes downgoing. Sensory exam normal. Coordination normal. No carotid bruit. No neck stiffness.     DATA :  LABS:  General Labs:    CBC:   Lab Results   Component Value Date    WBC 4.5 07/19/2020    RBC 4.01 07/19/2020    HGB 12.2 07/19/2020    HCT 36.8 07/19/2020    MCV 91.7 07/19/2020    MCH 30.4 07/19/2020    MCHC 33.1 07/19/2020    RDW 13.9 07/19/2020     07/19/2020    MPV 6.8 07/19/2020     BMP:    Lab Results   Component Value Date     07/19/2020    K 4.4 07/19/2020    K 4.2 07/15/2020     07/19/2020    CO2 22 07/19/2020    BUN 15 07/19/2020    CREATININE 0.9 07/19/2020    CALCIUM 9.4 07/19/2020    GFRAA >60 07/19/2020    LABGLOM >60 07/19/2020    GLUCOSE 86 07/19/2020     RADIOLOGY REVIEW:  I have reviewed radiology image(s) and reports(s) of: MRI brain      IMPRESSION :  TIA  Severe left internal carotid artery stenosis  Status post left carotid endarterectomy earlier this morning  Patient Active Problem List   Diagnosis    Hyponatremia    Hypokalemia    Internal carotid artery occlusion, left    Leukopenia    Hepatitis C    HTN (hypertension)       RECOMMENDATIONS :  Discussed with patient  Continue antiplatelet therapy with aspirin and Plavix. Continue statin therapy with Lipitor  Since patient is neurologically stable, I will sign off at this time. Please call if needed. Please note a portion of this chart was generated using dragon dictation software. Although every effort was made to ensure the accuracy of this automated transcription, some errors in transcription may have occurred.          Inocente Alarcon M.D.

## 2020-07-20 NOTE — PROGRESS NOTES
Patient to PACU from OR. She is alert. VSS. Surgical incision to left neck c/d/i. Patient follows commands, neuro assessment wnl. Will monitor.

## 2020-07-20 NOTE — PROGRESS NOTES
Incentive Spirometry education and demonstration completed by Respiratory Therapy Yes      Response to education: Very Good     Teaching Time: 5 minutes    Minimum Predicted Vital Capacity - 524 mL. Patient's Actual Vital Capacity - 1500 mL. Turning over to Nursing for routine follow-up Yes.     Comments:     Electronically signed by Lis Sotelo RCP on 7/20/2020 at 5:14 PM

## 2020-07-20 NOTE — PROGRESS NOTES
Hospitalist Progress Note    Patient:  Kavita Meng  Unit/Bed:OR/NONE   YOB: 1960       MRN: [id number] Acct: [de-identified]  PCP: EMILIANA Razo, ROOSEVELT - NP    Date of Admission: 7/14/2020  --------------------------    Chief Complaint:      headache    Hospital Course:     Kavita Meng is a 62 y.o. female hospitalized on 7/14/2020 for headache, find to have severe hyponatremia. Known history of left internal carotid artery stenosis requiring endarterectomy    During her hospital was noted to have severe hypotension, improved with midodrine and stopping her multiple antihypertensive agent. Assessment:       1. Moderate to severe hyponatremia, resolved, likely secondary to hydrochlorothiazide. 2. Hypotension, attributed to multiple antihypertensive agent, resolved, was on midodrine for short period of time. 3. Hypotension, hold all blood pressure medication including hydrochlorothiazide, currently on Midodrine   4. Headache, likely tension type. Waxing and waning  5. Episode of left facial paresthesia, stroke alert called last night. Repeated CT scan no acute finding. Symptoms improved, no TPA given. 6. Transient episode of confusion of undetermined etiology. Returned to baseline. No focal neurological deficit.       comorbidities:    · Hep C  · Tobacco dependence  ·     Plan:  1. Left carotid endarterectomy planned for today  2. Continue aspirin, statin, Plavix. 3. Currently received IV fluid for hypotension. ,  Blood pressure improved. Discussed with nephrology, likely check for hypercortisolism. Code Status: Full Code         DVT prophylaxis: SCD       Disposition: Discharge home likely tomorrow. I discussed my thought processes at length with patient/family and patient understood.  Question and concerns  Addressed      Discussed with RN      ----------------      Subjective:     Patient seen and examined  Overnight events noted  RN and ancillary staff note reviewed    Overnight she had transient facial numbness, acute stroke alert called, symptoms resolved. CT scan of the head without acute finding    Seen today postoperatively, noted to have hypotension which subsequently resolved with fluid, she has no complaint today. Diet: Diet NPO Effective Now    OBJECTIVE     Exam:  BP (!) 149/91   Pulse 70   Temp 97.3 °F (36.3 °C) (Temporal)   Resp 16   Ht 5' 3\" (1.6 m)   Wt 129 lb 6.6 oz (58.7 kg)   SpO2 99%   BMI 22.92 kg/m²            Gen: Not in distress. Alert. Head: Normocephalic. Atraumatic. Eyes: Conjunctivae/corneas clear. ENT: Oral mucosa moist  Neck: Left neck wound. No bleeding. CVS: Nml S1S2, no murmur  , RRR  Pulmomary: Clear bilaterally. No crackles. No wheezes. Gastrointestinal: Soft, non tender, non distend, . Musculoskeletal: No edema. Warm  Neuro: No focal deficit. Moves extremity spontaneously. Psychiatry: Appropriate affect. Not agitated.     Medications:  Reviewed    Infusion Medications    sodium chloride      sodium chloride 50 mL/hr at 07/20/20 0002     Scheduled Medications    ceFAZolin (ANCEF) IVPB  2 g Intravenous On Call to OR    clindamycin (CLEOCIN) IV  900 mg Intravenous On Call to OR    sodium chloride flush  10 mL Intravenous 2 times per day    ceFAZolin  2 g Intravenous On Call to OR    midodrine  2.5 mg Oral BID WC    nicotine  1 patch Transdermal Daily    escitalopram  10 mg Oral Daily    fluticasone  1 spray Each Nostril Daily    pantoprazole  40 mg Oral BID AC    atorvastatin  10 mg Oral Nightly    tiZANidine  4 mg Oral TID    traZODone  150 mg Oral Nightly    sodium chloride flush  10 mL Intravenous 2 times per day    aspirin  325 mg Oral Daily    clopidogrel  75 mg Oral Daily     PRN Meds: sodium chloride, nitroGLYCERIN, sodium chloride flush, oxyCODONE-acetaminophen, perflutren lipid microspheres, ALPRAZolam, sodium chloride flush, acetaminophen **OR** acetaminophen, magnesium hydroxide, promethazine **OR** ondansetron, potassium chloride **OR** potassium alternative oral replacement **OR** potassium chloride      Intake/Output Summary (Last 24 hours) at 7/20/2020 0905  Last data filed at 7/20/2020 0850  Gross per 24 hour   Intake 2290 ml   Output 1530 ml   Net 760 ml             Labs:   Recent Labs     07/18/20  0502 07/19/20  0517   WBC 4.6 4.5   HGB 13.1 12.2   HCT 39.0 36.8    217     Recent Labs     07/18/20  0502 07/19/20  0517   * 140   K 4.0 4.4    108   CO2 17* 22   BUN 11 15   CREATININE 0.8 0.9   CALCIUM 10.0 9.4     No results for input(s): AST, ALT, BILIDIR, BILITOT, ALKPHOS in the last 72 hours. Recent Labs     07/19/20  0517   INR 0.95     Recent Labs     07/19/20  1929   TROPONINI <0.01       Urinalysis:      Lab Results   Component Value Date    NITRU Negative 07/13/2020    WBCUA 0 07/13/2020    RBCUA 4 07/13/2020    BLOODU TRACE 07/13/2020    SPECGRAV 1.008 07/13/2020    GLUCOSEU Negative 07/13/2020       Radiology:  CTA HEAD NECK W CONTRAST   Final Result   1. No significant stenosis or occlusion of the intracranial vasculature. 2. Atherosclerosis at the left carotid bulb with significant (greater than   75%) stenosis in the proximal left ICA. No significant stenosis within the   right carotid artery or bilateral vertebral arteries. CT HEAD WO CONTRAST   Final Result   No acute intracranial abnormality. Microangiopathic change         MRI BRAIN WO CONTRAST   Final Result   1. No acute intracranial abnormality. Specifically, no acute infarction. 2. Mild parenchymal volume loss. Sequela of minimal chronic microvascular   ischemic changes. CTA HEAD NECK W CONTRAST   Preliminary Result   Near-total occlusion of the proximal left internal carotid artery without   full distal lumen collapse. RECOMMENDATIONS:   Mary Restrepo. Carotid Near-Occlusion: A Comprehensive Review, Part   1--Definition, Terminology, and Diagnosis.

## 2020-07-20 NOTE — PROGRESS NOTES
Preoperative-alert and oriented. Smile symmetrical. Tongue protrusion midline, able to move side-to-side. Hand grasps equal and strong. Feet pushes and pulls equal and strong.

## 2020-07-20 NOTE — PROGRESS NOTES
Spoke with stroke team when they called into check on pt status. States will not give tpa as pt does not have any major effects and strength not diminished.  Will continue to monitor  [address]

## 2020-07-20 NOTE — OP NOTE
Preoperative Diagnosis:   Left Carotid Stenosis    Postoperative Diagnosis:  Same    Procedure:   1. Left carotid endarterectomy    2. Completion Duplex Scan    Surgeon: Aminata Banks II    Date of Surgery: 07/20/20      Indications: carotid occlusive disease     Anesthesia: General endotrachial anesthesia    Findings:    Plaque Characteristics: 90% stenosis,      Ulceration: No      Fresh Thrombus: No    Cerebral Protection:   Blood pressure maintenance      Gonzalez-Inahara shunt    Completion Duplex:  No B-mode abnormalities      ICA with low resistive waveform pattern    Drain:   none  Closure: Platysma : Running 3-0 vicryl Skin: Running 4-0 moncryl    Dressing: Dermabond/Steristrip, Gauze    Sponge, Needle, Instrument Counts: Correct    Estimated Blood Loss: 100 ml    Fluids: 500 ml crystalloid        Disposition:   The patient awoke in the operating room, was extubated, moved all 4 extremities to command, and returned to PACU in satisfactory condition. Technique:          The patient was brought to the operating room, after being properly identified and marked, and placed on the table in supine positions. After induction of General endotrachial anesthesia, the neck was prepped and draped in the usual sterile fashion. A neck incision was made with a # 10 scalpel blade and deepened through subcutaneous tissue and platysma using electrocautery. The facial vein was identified and doubly clamped. It was  suture-ligated with 3-0 silk and then divided. The common, external and internal carotid arteries were sharply dissected and looped with veseel loops. 5000 units of heparin were administered and allowed to circulate for 3 minutes. PRIMARY SHUNT   Clamps were sequentially placed on the internal, external and common carotid arteries.   Using  a # 11 blade and a Meadows scissors, a longitudinal arteriotomy was extended from the common through the bifurcation plaque to normal internal carotid artery. A Gonzalez Inahara shunt was then placed into the internal carotid artery in standard fashion and flow was confirmed with a handheld doppler. STANDARD ENDARTERECTOMY   Endarterectomy was begun by elevating the plaque transversely in the common carotid with a freer-elevator. The plaque was divided transversely and the eversion endarterectomy was performed in the external carotid. Distal internal carotid endarterectomy was then accomplished by feathering the plaque distally. One 7-0 distal tacking sutures of 7-0 prolene were necessary. Proximal endarterectomy was completed by elevating the plaque in the common carotid and then amputating it flush with the arteriotomy. The endarterectomy bed was inspected under loop magnification and all fronds and defects were removed. A Bovine Pericardial patch was brought on to the field and a patch angioplasty performed using 2 running # 6-0 prolene suture. SHUNT REMOVAL        Prior to completion, the shunt was clamped and removed from the internal carotid. The internal carotid was backbled and clamped. The shunt was then removed from the common carotid. This vessel was flushed and then clamped. REPERFUSION AND CLOSURE       The distal internal and external carotids were backbled and the proximal common carotid was flushed. The endarterectomy bed was then flushed with heparinized saline and the suture line was secured. Flow was sequentially restored into the external followed by the internal carotid arteries. Hemostasis was obtained with surgicel and protamine. The duplex scan probe was then brought onto the field and the aforementioned findings were noted. Hemostasis was noted to be excellent. Platysma was approximated using a running technnique and skin was approximated with a running subcuticilar technique. Estuardo Evans M.D., FACS.   7/20/2020  9:42 AM

## 2020-07-20 NOTE — PROGRESS NOTES
MD Rosalina Werner MD Idolina Magyar, MD                  Office: [phone]                 Fax: [phone]         [address] Note      PATIENT NAME: Geo Jones  : 1960  MRN: [id number]     Assessment/Plan:  1. Hyponatremia, unclear duration. At goal.  Needed D5W to slow down correction. Etiology probably secondary to decreased salt intake plus hypotension plus HCTZ. Rate of correction now adequate. UOsm 206  Saranya less than 20  TSH WNL  2. Hypotension. Hold off on antihypertensives. IVF bolus then MIVF  +  Increase Midodrine now. Check random cortisol level. 3.  Renal function is within normal limits. 4.  History of severe left ICA stenosis- Vascular surgery following. S/p CEA on .    5.  History of hepatitis C. Normal renal function. UA only showed trace blood  6. Headache- w/u per Medicine and Neurology  7. Stable from renal perspective. Follow BP closely. High complexity. Multiple medical problems. Discussed with patient and treatment team. Hospitalist,   Thank you for allowing me to participate in this patient's care. Please do not hesitate to contact me for any questions/concerns. We will follow along with you. Lisa Baptiste MD  [facility] [address] [place] [address]   Phone: [phone] or [facility]  Fax: [phone]        Patient Active Problem List   Diagnosis    Hyponatremia    Hypokalemia    Internal carotid artery occlusion, left    Leukopenia    Hepatitis C    HTN (hypertension)       Past Medical History:   has a past medical history of Anxiety, Arthritis, Hepatitis C, Hyperlipidemia, Hypertension, and Reflux esophagitis. Past Social History:   reports that she has been smoking cigarettes. She has been smoking about 0.50 packs per day. She has never used smokeless tobacco. She reports current alcohol use.  She reports that she does not use drugs.     Subjective:    s/p   Left carotid endarterectomy today am    seen resting in bed  Doing ok      Objective:      /79   Pulse 90   Temp 97.6 °F (36.4 °C) (Temporal)   Resp 17   Ht 5' 3\" (1.6 m)   Wt 129 lb 6.6 oz (58.7 kg)   SpO2 98%   BMI 22.92 kg/m²     Wt Readings from Last 3 Encounters:   07/20/20 129 lb 6.6 oz (58.7 kg)   06/23/20 134 lb 9.6 oz (61.1 kg)       BP Readings from Last 3 Encounters:   07/20/20 118/79   07/20/20 (!) 174/79   06/23/20 90/68     Chest- clear  Heart-regular  Abd-soft  Ext- no edema    Labs  Hemoglobin   Date Value Ref Range Status   07/19/2020 12.2 12.0 - 16.0 g/dL Final     Hematocrit   Date Value Ref Range Status   07/19/2020 36.8 36.0 - 48.0 % Final     WBC   Date Value Ref Range Status   07/19/2020 4.5 4.0 - 11.0 K/uL Final     Platelets   Date Value Ref Range Status   07/19/2020 217 135 - 450 K/uL Final     Lab Results   Component Value Date    CREATININE 0.9 07/19/2020    BUN 15 07/19/2020     07/19/2020    K 4.4 07/19/2020     07/19/2020    CO2 22 07/19/2020

## 2020-07-20 NOTE — PROGRESS NOTES
Pt down to stat ct w/ clinical RN [facility] and to CVU from there per Dr. Vilma Mcintosh. No acute distress at time of transfer.

## 2020-07-20 NOTE — PROGRESS NOTES
Discussed with stroke team again at 8:42 PM.  Recommendation at this point is to hold off TPA, especially since patient's exam has improved.

## 2020-07-20 NOTE — ANESTHESIA PRE PROCEDURE
Department of Anesthesiology  Preprocedure Note       Name:  Brock Quinonez   Age:  62 y.o.  :  1960                                          MRN:  [id number]         Date:  2020      Surgeon: Sarthak Patrick):  Evelyn Malloy MD    Procedure: Procedure(s):  LEFT CAROTID ENDARTERECTOMY    Medications prior to admission:   Prior to Admission medications    Medication Sig Start Date End Date Taking? Authorizing Provider   simvastatin (ZOCOR) 20 MG tablet Take 20 mg by mouth nightly 6/10/20 6/10/21  Historical Provider, MD   calcium citrate-vitamin d (CALCIUM CITRATE + D) 250-200 MG-UNIT TABS Take by mouth    Historical Provider, MD   tiZANidine (ZANAFLEX) 4 MG capsule Take 4 mg by mouth 3 times daily    Historical Provider, MD   doxycycline hyclate (VIBRAMYCIN) 100 MG capsule  19   Historical Provider, MD   loratadine (CLARITIN) 10 MG capsule Take 10 mg by mouth daily    Historical Provider, MD   fluticasone (FLONASE) 50 MCG/ACT nasal spray 1 spray by Each Nostril route daily    Historical Provider, MD   ofloxacin (FLOXIN) 0.3 % otic solution Place 5 drops in ear(s) daily 7/10/20   Historical Provider, MD   lisinopril-hydroCHLOROthiazide (PRINZIDE;ZESTORETIC) 20-12.5 MG per tablet Take 1 tablet by mouth daily    Historical Provider, MD   acetaminophen (TYLENOL) 500 MG tablet Take 1,000 mg by mouth every 6 hours as needed for Pain    Historical Provider, MD   lisinopril (PRINIVIL;ZESTRIL) 20 MG tablet Take 20 mg by mouth daily 12.5mg    Historical Provider, MD   ALPRAZolam (XANAX) 0.5 MG tablet Take 0.5 mg by mouth 3 times daily as needed for Sleep.      Historical Provider, MD   aspirin 81 MG chewable tablet Take 81 mg by mouth daily    Historical Provider, MD   amLODIPine (NORVASC) 10 MG tablet Take 10 mg by mouth daily    Historical Provider, MD   metoprolol succinate (TOPROL XL) 50 MG extended release tablet Take 100 mg by mouth 2 times daily Only take evening dose if BP is high    Historical Provider, MD   traZODone (DESYREL) 100 MG tablet Take 150 mg by mouth nightly     Historical Provider, MD   escitalopram (LEXAPRO) 10 MG tablet Take 10 mg by mouth daily    Historical Provider, MD   pantoprazole sodium (PROTONIX) 40 MG PACK packet Take 40 mg by mouth 2 times daily (before meals)     Historical Provider, MD       Current medications:    No current facility-administered medications for this visit. No current outpatient medications on file.      Facility-Administered Medications Ordered in Other Visits   Medication Dose Route Frequency Provider Last Rate Last Dose    nitroGLYCERIN (NITROSTAT) SL tablet 0.4 mg  0.4 mg Sublingual Q5 Min PRN Zach Medina MD   0.4 mg at 07/19/20 1917    nitroGLYCERIN (NITROSTAT) 0.4 MG SL tablet             morphine 2 MG/ML injection             ceFAZolin (ANCEF) 2 g in dextrose 5 % 100 mL IVPB  2 g Intravenous On Call to [address] [place] [address], ROOSEVELT - CNP        clindamycin (CLEOCIN) 900 mg in dextrose 5 % 50 mL IVPB  900 mg Intravenous On Call to [address] [place] [address], ROOSEVELT - CNP        sodium chloride flush 0.9 % injection 10 mL  10 mL Intravenous 2 times per day ROOSEVELT Lewis - CNP        sodium chloride flush 0.9 % injection 10 mL  10 mL Intravenous PRN ROOSEVELT Lewis CNP        vancomycin 1000 mg IVPB in 250 mL D5W addavial  1,000 mg Intravenous On Call to [address] [place] [address], ROOSEVELT - CNP        ceFAZolin (ANCEF) 2 g in dextrose 5 % 100 mL IVPB  2 g Intravenous On Call to [address] [place] [address], APRN - CNP        0.9 % sodium chloride infusion   Intravenous Continuous ROOSEVELT Lewis CNP 50 mL/hr at 07/20/20 0002      oxyCODONE-acetaminophen (PERCOCET) 5-325 MG per tablet 1 tablet  1 tablet Oral Q4H PRN ROOSEVELT Lewis CNP   1 tablet at 07/18/20 1818    midodrine (PROAMATINE) tablet 2.5 mg  2.5 mg Oral BID  Zane Carroll MD   2.5 mg at 07/19/20 1625    nicotine (NICODERM CQ) 21 MG/24HR 1 patch  1 patch Transdermal Daily Filomena TREVIÑO Liam Mercado PA-C   1 patch at 07/19/20 5125    perflutren lipid microspheres (DEFINITY) injection 1.65 mg  1.5 mL Intravenous ONCE PRN Lisa Peters MD        ALPRAZolam Shadia Zion) tablet 0.5 mg  0.5 mg Oral Nightly PRN ED Pettit-C   0.5 mg at 07/19/20 2106    escitalopram (LEXAPRO) tablet 10 mg  10 mg Oral Daily ED Pettit-C   10 mg at 07/19/20 0911    fluticasone (FLONASE) 50 MCG/ACT nasal spray 1 spray  1 spray Each Nostril Daily KRYSTYNA PettitC   1 spray at 07/19/20 0914    pantoprazole (PROTONIX) tablet 40 mg  40 mg Oral BID AC ED Pettit-C   40 mg at 07/19/20 1625    atorvastatin (LIPITOR) tablet 10 mg  10 mg Oral Nightly KRYSTYNA PettitC   10 mg at 07/19/20 2105    tiZANidine (ZANAFLEX) tablet 4 mg  4 mg Oral TID ED Pettit-C   4 mg at 07/19/20 2105    traZODone (DESYREL) tablet 150 mg  150 mg Oral Nightly ED Ptetit-C   150 mg at 07/19/20 2106    sodium chloride flush 0.9 % injection 10 mL  10 mL Intravenous 2 times per day Brenda Aguilera PA-C   10 mL at 07/19/20 0915    sodium chloride flush 0.9 % injection 10 mL  10 mL Intravenous PRN ED Pettit-C   10 mL at 07/16/20 1755    acetaminophen (TYLENOL) tablet 650 mg  650 mg Oral Q6H PRN Brenda Aguilera PA-C   650 mg at 07/15/20 1105    Or    acetaminophen (TYLENOL) suppository 650 mg  650 mg Rectal Q6H PRN KRYSTYNA PettitC        magnesium hydroxide (MILK OF MAGNESIA) 400 MG/5ML suspension 30 mL  30 mL Oral Daily PRN Brenda Aguilera PA-C        promethazine (PHENERGAN) tablet 12.5 mg  12.5 mg Oral Q6H PRN ED Pettit-DEUCE        Or    [facility]ron [facility] injection 4 mg  4 mg Intravenous Q6H PRN ED Pettit-DEUCE   4 mg at 07/18/20 0245    aspirin tablet 325 mg  325 mg Oral Daily Brenda Aguilera PA-C   325 mg at 07/19/20 0911    clopidogrel (PLAVIX) tablet 75 mg  75 mg Oral Daily ED Pettit-DEUCE   75 mg at 07/19/20 0911    file to calculate BMI.    CBC:   Lab Results   Component Value Date    WBC 4.5 2020    RBC 4.01 2020    HGB 12.2 2020    HCT 36.8 2020    MCV 91.7 2020    RDW 13.9 2020     2020       CMP:   Lab Results   Component Value Date     2020    K 4.4 2020    K 4.2 07/15/2020     2020    CO2 22 2020    BUN 15 2020    CREATININE 0.9 2020    GFRAA >60 2020    LABGLOM >60 2020    GLUCOSE 86 2020    CALCIUM 9.4 2020       POC Tests:   Recent Labs     20  1909   POCGLU 148*       Coags:   Lab Results   Component Value Date    PROTIME 11.0 2020    INR 0.95 2020    APTT 33.7 2020       HCG (If Applicable): No results found for: PREGTESTUR, PREGSERUM, HCG, HCGQUANT     ABGs: No results found for: PHART, PO2ART, GGB8AVR, DZY4CPV, BEART, X1YVPBVS     Type & Screen (If Applicable):  No results found for: LABABO, LABRH    Drug/Infectious Status (If Applicable):  No results found for: HIV, HEPCAB    COVID-19 Screening (If Applicable):   Lab Results   Component Value Date    COVID19 Not Detected 2020         Anesthesia Evaluation     Anesthesia Plan        Anna Lunsford MD   2020        Department of Anesthesiology  Preprocedure Note       Name:  Katherine Moe   Age:  62 y.o.  :  1960                                          MRN:  [id number]         Date:  2020      Surgeon: Marisabel Newsome):  Merrell Oppenheim, MD    Procedure: Procedure(s):  LEFT CAROTID ENDARTERECTOMY    Medications prior to admission:   Prior to Admission medications    Medication Sig Start Date End Date Taking?  Authorizing Provider   simvastatin (ZOCOR) 20 MG tablet Take 20 mg by mouth nightly 6/10/20 6/10/21 Yes Historical Provider, MD   calcium citrate-vitamin d (CALCIUM CITRATE + D) 250-200 MG-UNIT TABS Take by mouth   Yes Historical Provider, MD   tiZANidine (ZANAFLEX) 4 MG capsule Take 4 mg by mouth 3 times daily   Yes Historical Provider, MD   doxycycline hyclate (VIBRAMYCIN) 100 MG capsule  1/26/19  Yes Historical Provider, MD   fluticasone (FLONASE) 50 MCG/ACT nasal spray 1 spray by Each Nostril route daily   Yes Historical Provider, MD   ofloxacin (FLOXIN) 0.3 % otic solution Place 5 drops in ear(s) daily 7/10/20  Yes Historical Provider, MD   lisinopril-hydroCHLOROthiazide (PRINZIDE;ZESTORETIC) 20-12.5 MG per tablet Take 1 tablet by mouth daily   Yes Historical Provider, MD   acetaminophen (TYLENOL) 500 MG tablet Take 1,000 mg by mouth every 6 hours as needed for Pain   Yes Historical Provider, MD   ALPRAZolam (XANAX) 0.5 MG tablet Take 0.5 mg by mouth 3 times daily as needed for Sleep.     Yes Historical Provider, MD   aspirin 81 MG chewable tablet Take 81 mg by mouth daily   Yes Historical Provider, MD   amLODIPine (NORVASC) 10 MG tablet Take 10 mg by mouth daily   Yes Historical Provider, MD   metoprolol succinate (TOPROL XL) 50 MG extended release tablet Take 100 mg by mouth 2 times daily Only take evening dose if BP is high   Yes Historical Provider, MD   traZODone (DESYREL) 100 MG tablet Take 150 mg by mouth nightly    Yes Historical Provider, MD   escitalopram (LEXAPRO) 10 MG tablet Take 10 mg by mouth daily   Yes Historical Provider, MD   pantoprazole sodium (PROTONIX) 40 MG PACK packet Take 40 mg by mouth 2 times daily (before meals)    Yes Historical Provider, MD   loratadine (CLARITIN) 10 MG capsule Take 10 mg by mouth daily    Historical Provider, MD   lisinopril (PRINIVIL;ZESTRIL) 20 MG tablet Take 20 mg by mouth daily 12.5mg    Historical Provider, MD       Current medications:    Current Facility-Administered Medications   Medication Dose Route Frequency Provider Last Rate Last Dose    nitroGLYCERIN (NITROSTAT) SL tablet 0.4 mg  0.4 mg Sublingual Q5 Min PRN Alix Perez MD   0.4 mg at 07/19/20 1917    nitroGLYCERIN (NITROSTAT) 0.4 MG SL tablet             morphine 2 MG/ML  Leukopenia D72.819    Hepatitis C B19.20    HTN (hypertension) I10       Past Medical History:        Diagnosis Date    Anxiety     Arthritis     Hepatitis C     Hyperlipidemia     Hypertension     Reflux esophagitis        Past Surgical History:        Procedure Laterality Date    FRACTURE SURGERY      left arm    JOINT REPLACEMENT      knee       Social History:    Social History     Tobacco Use    Smoking status: Current Every Day Smoker     Packs/day: 0.50     Types: Cigarettes    Smokeless tobacco: Never Used   Substance Use Topics    Alcohol use: Yes     Frequency: 2-3 times a week     Comment: soc                                Ready to quit: Not Answered  Counseling given: Not Answered      Vital Signs (Current):   Vitals:    07/20/20 0000 07/20/20 0200 07/20/20 0400 07/20/20 0625   BP: 133/77 (!) 145/75 137/85 (!) 149/91   Pulse: 59 55 81 70   Resp: 23 16 20 16   Temp: 97 °F (36.1 °C)  96.8 °F (36 °C) 97.3 °F (36.3 °C)   TempSrc: Temporal  Temporal Temporal   SpO2: 99%  (!) 76% 99%   Weight:   129 lb 6.6 oz (58.7 kg)    Height:                                                  BP Readings from Last 3 Encounters:   07/20/20 (!) 149/91   06/23/20 90/68       NPO Status: Time of last liquid consumption: 0000                        Time of last solid consumption: 0000                        Date of last liquid consumption: 07/19/20                        Date of last solid food consumption: 07/19/20    BMI:   Wt Readings from Last 3 Encounters:   07/20/20 129 lb 6.6 oz (58.7 kg)   06/23/20 134 lb 9.6 oz (61.1 kg)     Body mass index is 22.92 kg/m².     CBC:   Lab Results   Component Value Date    WBC 4.5 07/19/2020    RBC 4.01 07/19/2020    HGB 12.2 07/19/2020    HCT 36.8 07/19/2020    MCV 91.7 07/19/2020    RDW 13.9 07/19/2020     07/19/2020       CMP:   Lab Results   Component Value Date     07/19/2020    K 4.4 07/19/2020    K 4.2 07/15/2020     07/19/2020    CO2 22 07/19/2020    BUN 15 07/19/2020    CREATININE 0.9 07/19/2020    GFRAA >60 07/19/2020    LABGLOM >60 07/19/2020    GLUCOSE 86 07/19/2020    CALCIUM 9.4 07/19/2020       POC Tests:   Recent Labs     07/19/20 1909   POCGLU 148*       Coags:   Lab Results   Component Value Date    PROTIME 11.0 07/19/2020    INR 0.95 07/19/2020    APTT 33.7 07/13/2020       HCG (If Applicable): No results found for: PREGTESTUR, PREGSERUM, HCG, HCGQUANT     ABGs: No results found for: PHART, PO2ART, APG0XZH, ADU9QDB, BEART, J9WZRUSY     Type & Screen (If Applicable):  No results found for: LABABO, LABRH    Drug/Infectious Status (If Applicable):  No results found for: HIV, HEPCAB    COVID-19 Screening (If Applicable):   Lab Results   Component Value Date    COVID19 Not Detected 07/13/2020         Anesthesia Evaluation  Patient summary reviewed and Nursing notes reviewed  Airway: Mallampati: II        Dental: normal exam         Pulmonary:normal exam                               Cardiovascular:  Exercise tolerance: good (>4 METS),   (+) hypertension:, hyperlipidemia      ECG reviewed  Rhythm: regular                      Neuro/Psych:   (+) TIA,             GI/Hepatic/Renal:   (+) GERD:, hepatitis: C, liver disease:,           Endo/Other:                     Abdominal:           Vascular:   + PVD, aortic or cerebral, . Anesthesia Plan      general     ASA 3       Induction: intravenous. arterial line  MIPS: Postoperative opioids intended, Prophylactic antiemetics administered and Postoperative trial extubation. Anesthetic plan and risks discussed with patient. Plan discussed with CRNA.           MEDICATIONS:  Current Facility-Administered Medications   Medication Dose Route Frequency Provider Last Rate Last Dose    nitroGLYCERIN (NITROSTAT) SL tablet 0.4 mg  0.4 mg Sublingual Q5 Min PRN Lauren Carrington MD   0.4 mg at 07/19/20 1917    nitroGLYCERIN (NITROSTAT) 0.4 MG SL tablet             morphine 2 MG/ML injection             ceFAZolin (ANCEF) 2 g in dextrose 5 % 100 mL IVPB  2 g Intravenous On Call to [address] [place] [address], APRARACELY - CNP        clindamycin (CLEOCIN) 900 mg in dextrose 5 % 50 mL IVPB  900 mg Intravenous On Call to [address] [place] [address], ROOSEVELT - CNP        sodium chloride flush 0.9 % injection 10 mL  10 mL [facility]        sodium chloride flush 0.9 % injection 10 mL  10 mL Intravenous PRN [facility]        vancomycin 1000 mg IVPB in 250 mL D5W addavial  1,000 mg Intravenous On Call to [address] [place] [address], APRN - CNP        ceFAZolin (ANCEF) 2 g in dextrose 5 % 100 mL IVPB  2 g Intravenous On Call to [address] [place] [address], APRN - CNP        0.9 % sodium chloride infusion   Intravenous Continuous [facility] ROOSEVELT Martinez - CNP 50 mL/hr at 07/20/20 0002      oxyCODONE-acetaminophen (PERCOCET) 5-325 MG per tablet 1 tablet  1 tablet Oral Q4H PRN [facility] APRARACELY - CNP   1 tablet at 07/18/20 1818    midodrine (PROAMATINE) tablet 2.5 mg  2.5 mg Oral BID  Zane Reid MD   2.5 mg at 07/19/20 1625    nicotine (NICODERM CQ) 21 MG/24HR 1 patch  1 patch Transdermal Daily Girma Carson PA-C   1 patch at 07/19/20 6075    perflutren lipid microspheres (DEFINITY) injection 1.65 mg  1.5 mL Intravenous ONCE PRN Justen Martinez MD        ALPRAZolam [facility]) tablet 0.5 mg  0.5 mg Oral Nightly PRN Girma Carson PA-C   0.5 mg at 07/19/20 2106    escitalopram (LEXAPRO) tablet 10 mg  10 mg Oral Daily Girma Carson PA-C   10 mg at 07/19/20 0911    fluticasone (FLONASE) 50 MCG/ACT nasal spray 1 spray  1 spray Each Nostril Daily Girma Carson PA-C   1 spray at 07/19/20 0914    pantoprazole (PROTONIX) tablet 40 mg  40 mg Oral BID AC Girma Carson PA-C   40 mg at 07/19/20 1625    atorvastatin (LIPITOR) tablet 10 mg  10 mg Oral Nightly Girma Carson PA-C   10 mg at 07/19/20 2105    tiZANidine (ZANAFLEX) tablet 4 mg  4 mg Oral TID Yoav Saurabh, PA-C   4 mg at 07/19/20 2105    traZODone (DESYREL) tablet 150 mg  150 mg Oral Nightly Yoav Saurabh, PA-C   150 mg at 07/19/20 2106    sodium chloride flush 0.9 % injection 10 mL  10 mL Intravenous 2 times per day Yoav Saurabh, PA-C   10 mL at 07/19/20 0915    sodium chloride flush 0.9 % injection 10 mL  10 mL Intravenous PRN Yoav Saurabh, PA-C   10 mL at 07/16/20 1755    acetaminophen (TYLENOL) tablet 650 mg  650 mg Oral Q6H PRN Yoav Saurabh, PA-C   650 mg at 07/15/20 1105    Or    acetaminophen (TYLENOL) suppository 650 mg  650 mg Rectal Q6H PRN Yoav Saurabh, PA-C        magnesium hydroxide (MILK OF MAGNESIA) 400 MG/5ML suspension 30 mL  30 mL Oral Daily PRN Yoav Saurabh, PA-C        promethazine (PHENERGAN) tablet 12.5 mg  12.5 mg Oral Q6H PRN Yoav Saurabh, PA-C        Or    ondansetron [facility]) injection 4 mg  4 mg Intravenous Q6H PRN Yoav Saurabh, PA-C   4 mg at 07/18/20 0245    aspirin tablet 325 mg  325 mg Oral Daily Yoav Saurabh, PA-C   325 mg at 07/19/20 0911    clopidogrel (PLAVIX) tablet 75 mg  75 mg Oral Daily Yoav Saruabh, PA-C   75 mg at 07/19/20 0911    potassium chloride (KLOR-CON M) extended release tablet 40 mEq  40 mEq Oral PRN Yoav Saurabh, PA-C   40 mEq at 07/15/20 0108    Or    potassium bicarb-citric acid (EFFER-K) effervescent tablet 40 mEq  40 mEq Oral PRN Yoav Saurabh, PA-C        Or    potassium chloride 10 mEq/100 mL IVPB (Peripheral Line)  10 mEq Intravenous PRN Yoav Saurabh, PA-C            LABS:  Lab Results   Component Value Date    WBC 4.5 07/19/2020    HGB 12.2 07/19/2020    HCT 36.8 07/19/2020    MCV 91.7 07/19/2020     07/19/2020    GLUCOSE 86 07/19/2020    PROTIME 11.0 07/19/2020    INR 0.95 07/19/2020    APTT 33.7 07/13/2020       Lab Results   Component Value Date     07/19/2020    K 4.4 07/19/2020     07/19/2020    CO2 22 07/19/2020    BUN 15 07/19/2020 CREATININE 0.9 07/19/2020       Problem List:  Patient Active Problem List   Diagnosis    Hyponatremia    Hypokalemia    Internal carotid artery occlusion, left    Leukopenia    Hepatitis C    HTN (hypertension)         Karan Pineda MD   7/20/2020

## 2020-07-20 NOTE — ANESTHESIA POSTPROCEDURE EVALUATION
Department of Anesthesiology  Postprocedure Note    Patient: Kiarra Mireles  MRN: [id number]  YOB: 1960  Date of evaluation: 7/20/2020  Time:  2:45 PM     Procedure Summary     Date:  07/20/20 Room / Location:  [address]    Anesthesia Start:  [address] Anesthesia Stop:  7658    Procedure:  LEFT CAROTID ENDARTERECTOMY WITH INTRAOPERATIVE DUPLEX SCAN (Left ) Diagnosis:  (LEFT CAROTID ARTERY STENOSIS I65.23)    Surgeon:  Huan Ramires MD Responsible Provider:  Alexis Basilio MD    Anesthesia Type:  general ASA Status:  3          Anesthesia Type: general    Ирина Phase I: Ирина Score: 10    Ирина Phase II:      Last vitals: Reviewed and per EMR flowsheets.        Anesthesia Post Evaluation    Level of consciousness: awake  Complications: no

## 2020-07-20 NOTE — FLOWSHEET NOTE
Jorden started. Blood pressure labile d/t recovery time from vascular suregry . Watching blood pressure closely and titrating gtts as needed for patient stability.

## 2020-07-20 NOTE — PROGRESS NOTES
4 Eyes Skin Assessment     The patient is being assess for  Transfer to New Unit    I agree that 2 RN's have performed a thorough Head to Toe Skin Assessment on the patient. ALL assessment sites listed below have been assessed. Areas assessed by both nurses: Therezoe Jansky  [x]   Head, Face, and Ears   [x]   Shoulders, Back, and Chest  [x]   Arms, Elbows, and Hands   [x]   Coccyx, Sacrum, and IschIum  [x]   Legs, Feet, and Heels        Does the Patient have Skin Breakdown?   No         Faustino Prevention initiated:  No   Wound Care Orders initiated:  No      [facility] nurse consulted for Pressure Injury (Stage 3,4, Unstageable, DTI, NWPT, and Complex wounds), New and Established Ostomies:  No      Nurse 1 eSignature: Electronically signed by Asher Howard RN on 7/19/20 at 11:00 PM EDT    **SHARE this note so that the co-signing nurse is able to place an eSignature**    Nurse 2 eSignature: Electronically signed by Ced Acuna RN on 7/20/20 at 2:33 AM EDT

## 2020-07-20 NOTE — PROGRESS NOTES
Spoke with dr Roland Pro, vascular, updated on pt transfer after stroke alert.  Plan to proceed with carotid with jadyn in am as planned  [address]

## 2020-07-20 NOTE — PROGRESS NOTES
Pt to cvu with leonora cordova. nihs done at bedside with both rn's. Pt only unable to move left leg up shin but states it is due to pain from knee problem, wont attempt to slide up leg.  Pt states numbness on face on left, but  can feel both sides   [address]

## 2020-07-20 NOTE — PROGRESS NOTES
Pt up to bsc with standby assist. Pt able to move to side of bed and to bsc without issue.  No weakness noted from this rn or pt  [address]

## 2020-07-21 VITALS
RESPIRATION RATE: 14 BRPM | HEIGHT: 63 IN | DIASTOLIC BLOOD PRESSURE: 74 MMHG | WEIGHT: 133.38 LBS | OXYGEN SATURATION: 98 % | TEMPERATURE: 98.1 F | HEART RATE: 71 BPM | SYSTOLIC BLOOD PRESSURE: 150 MMHG | BODY MASS INDEX: 23.63 KG/M2

## 2020-07-21 LAB
ALBUMIN SERPL-MCNC: 3.4 G/DL (ref 3.4–5)
ANION GAP SERPL CALCULATED.3IONS-SCNC: 12 MMOL/L (ref 3–16)
BASOPHILS ABSOLUTE: 0 K/UL (ref 0–0.2)
BASOPHILS RELATIVE PERCENT: 0.2 %
BUN BLDV-MCNC: 8 MG/DL (ref 7–20)
CALCIUM SERPL-MCNC: 8.8 MG/DL (ref 8.3–10.6)
CHLORIDE BLD-SCNC: 106 MMOL/L (ref 99–110)
CO2: 19 MMOL/L (ref 21–32)
CORTISOL - AM: 1.3 UG/DL (ref 4.3–22.4)
CREAT SERPL-MCNC: 0.8 MG/DL (ref 0.6–1.2)
EOSINOPHILS ABSOLUTE: 0 K/UL (ref 0–0.6)
EOSINOPHILS RELATIVE PERCENT: 0.1 %
GFR AFRICAN AMERICAN: >60
GFR NON-AFRICAN AMERICAN: >60
GLUCOSE BLD-MCNC: 122 MG/DL (ref 70–99)
HCT VFR BLD CALC: 30.7 % (ref 36–48)
HEMOGLOBIN: 10.1 G/DL (ref 12–16)
LYMPHOCYTES ABSOLUTE: 1.2 K/UL (ref 1–5.1)
LYMPHOCYTES RELATIVE PERCENT: 8.9 %
MCH RBC QN AUTO: 30.5 PG (ref 26–34)
MCHC RBC AUTO-ENTMCNC: 32.9 G/DL (ref 31–36)
MCV RBC AUTO: 92.5 FL (ref 80–100)
MONOCYTES ABSOLUTE: 0.7 K/UL (ref 0–1.3)
MONOCYTES RELATIVE PERCENT: 5.4 %
NEUTROPHILS ABSOLUTE: 11.5 K/UL (ref 1.7–7.7)
NEUTROPHILS RELATIVE PERCENT: 85.4 %
PDW BLD-RTO: 14.1 % (ref 12.4–15.4)
PHOSPHORUS: 4.1 MG/DL (ref 2.5–4.9)
PLATELET # BLD: 232 K/UL (ref 135–450)
PMV BLD AUTO: 6.9 FL (ref 5–10.5)
POTASSIUM SERPL-SCNC: 4.3 MMOL/L (ref 3.5–5.1)
RBC # BLD: 3.32 M/UL (ref 4–5.2)
SODIUM BLD-SCNC: 137 MMOL/L (ref 136–145)
TROPONIN: <0.01 NG/ML
WBC # BLD: 13.5 K/UL (ref 4–11)

## 2020-07-21 PROCEDURE — 36415 COLL VENOUS BLD VENIPUNCTURE: CPT

## 2020-07-21 PROCEDURE — 2580000003 HC RX 258: Performed by: SURGERY

## 2020-07-21 PROCEDURE — 80069 RENAL FUNCTION PANEL: CPT

## 2020-07-21 PROCEDURE — 85025 COMPLETE CBC W/AUTO DIFF WBC: CPT

## 2020-07-21 PROCEDURE — 6370000000 HC RX 637 (ALT 250 FOR IP): Performed by: SURGERY

## 2020-07-21 PROCEDURE — 84484 ASSAY OF TROPONIN QUANT: CPT

## 2020-07-21 PROCEDURE — 82533 TOTAL CORTISOL: CPT

## 2020-07-21 PROCEDURE — APPSS30 APP SPLIT SHARED TIME 16-30 MINUTES: Performed by: NURSE PRACTITIONER

## 2020-07-21 PROCEDURE — APPNB30 APP NON BILLABLE TIME 0-30 MINS: Performed by: NURSE PRACTITIONER

## 2020-07-21 RX ORDER — CLOPIDOGREL BISULFATE 75 MG/1
75 TABLET ORAL DAILY
Qty: 30 TABLET | Refills: 3 | Status: ON HOLD | OUTPATIENT
Start: 2020-07-22 | End: 2022-09-27 | Stop reason: HOSPADM

## 2020-07-21 RX ORDER — OXYCODONE HYDROCHLORIDE AND ACETAMINOPHEN 5; 325 MG/1; MG/1
1 TABLET ORAL EVERY 6 HOURS PRN
Qty: 28 TABLET | Refills: 0 | Status: SHIPPED | OUTPATIENT
Start: 2020-07-21 | End: 2020-07-30 | Stop reason: SDUPTHER

## 2020-07-21 RX ADMIN — OXYCODONE HYDROCHLORIDE AND ACETAMINOPHEN 1 TABLET: 5; 325 TABLET ORAL at 02:47

## 2020-07-21 RX ADMIN — SODIUM CHLORIDE, POTASSIUM CHLORIDE, SODIUM LACTATE AND CALCIUM CHLORIDE: 600; 310; 30; 20 INJECTION, SOLUTION INTRAVENOUS at 04:38

## 2020-07-21 RX ADMIN — PANTOPRAZOLE SODIUM 40 MG: 40 TABLET, DELAYED RELEASE ORAL at 06:26

## 2020-07-21 RX ADMIN — OXYCODONE HYDROCHLORIDE AND ACETAMINOPHEN 1 TABLET: 5; 325 TABLET ORAL at 08:28

## 2020-07-21 RX ADMIN — Medication 10 ML: at 08:30

## 2020-07-21 RX ADMIN — TIZANIDINE 4 MG: 4 TABLET ORAL at 08:28

## 2020-07-21 RX ADMIN — ESCITALOPRAM OXALATE 10 MG: 10 TABLET ORAL at 08:28

## 2020-07-21 RX ADMIN — CLOPIDOGREL 75 MG: 75 TABLET, FILM COATED ORAL at 08:28

## 2020-07-21 RX ADMIN — ASPIRIN 325 MG ORAL TABLET 325 MG: 325 PILL ORAL at 08:28

## 2020-07-21 ASSESSMENT — PAIN DESCRIPTION - ONSET
ONSET: AWAKENED FROM SLEEP
ONSET: GRADUAL

## 2020-07-21 ASSESSMENT — PAIN SCALES - GENERAL
PAINLEVEL_OUTOF10: 2
PAINLEVEL_OUTOF10: 1
PAINLEVEL_OUTOF10: 8
PAINLEVEL_OUTOF10: 10
PAINLEVEL_OUTOF10: 8
PAINLEVEL_OUTOF10: 10

## 2020-07-21 ASSESSMENT — PAIN DESCRIPTION - PAIN TYPE
TYPE: ACUTE PAIN;SURGICAL PAIN
TYPE: SURGICAL PAIN

## 2020-07-21 ASSESSMENT — PAIN DESCRIPTION - LOCATION
LOCATION: NECK
LOCATION: HEAD

## 2020-07-21 ASSESSMENT — PAIN DESCRIPTION - DESCRIPTORS
DESCRIPTORS: HEADACHE
DESCRIPTORS: THROBBING

## 2020-07-21 ASSESSMENT — PAIN DESCRIPTION - FREQUENCY
FREQUENCY: CONTINUOUS
FREQUENCY: CONTINUOUS

## 2020-07-21 ASSESSMENT — PAIN DESCRIPTION - PROGRESSION: CLINICAL_PROGRESSION: GRADUALLY WORSENING

## 2020-07-21 ASSESSMENT — PAIN DESCRIPTION - ORIENTATION: ORIENTATION: LEFT

## 2020-07-21 NOTE — DISCHARGE SUMMARY
Hospital Medicine Discharge Summary    Maxine Johns  :  1960  MRN:  [id number]    Admit date:  2020  Discharge date:  2020    Admitting Physician:  Juan R Odell MD  Primary Care Physician:  Clarissa Dorado, APRN-CNP, APRN - NP      Discharge Diagnoses: Active Problems:    Hyponatremia    Hypokalemia    Internal carotid artery occlusion, left    Leukopenia    Hepatitis C    HTN (hypertension)  Resolved Problems:    * No resolved hospital problems. *      Hospital Course:   Maxine Johns is a 62 y.o. female that was admitted and treated at [facility]   for the following medical issues:  Patient admitted with headache or found to have severe hyponatremia has known history of left internal carotid stenosis carotid enterectomy patient was followed by nephrology started on [facility] drain hyponatremia has now resolved and patient is stable to be discharged we will follow-up with nephrology and primary care as an outpatient       Active Problems:    Hyponatremia    Hypokalemia    Internal carotid artery occlusion, left    Leukopenia    Hepatitis C    HTN (hypertension)  Resolved Problems:    * No resolved hospital problems. *      Patient was seen by the following consultants while admitted to [facility]:   Consults:  [id number] [place] [address] TO HOSPITALIST  IP CONSULT TO NEPHROLOGY  IP CONSULT TO CARDIOLOGY  IP CONSULT TO NEUROLOGY    Significant Diagnostic Studies:    Ct Head Wo Contrast    Result Date: 2020  EXAMINATION: CT OF THE HEAD WITHOUT CONTRAST  2020 5:35 pm TECHNIQUE: CT of the head was performed without the administration of intravenous contrast. Dose modulation, iterative reconstruction, and/or weight based adjustment of the mA/kV was utilized to reduce the radiation dose to as low as reasonably achievable.  COMPARISON: CT head 2020 HISTORY: ORDERING SYSTEM PROVIDED HISTORY: headache TECHNOLOGIST PROVIDED HISTORY: Reason for exam:->headache Has a \"code stroke\" or \"stroke alert\" been called? ->No Reason for Exam: Headache. Acuity: Acute Type of Exam: Initial FINDINGS: BRAIN/VENTRICLES: There is no acute intracranial hemorrhage, mass effect or midline shift. No abnormal extra-axial fluid collection. The gray-white differentiation is maintained without evidence of an acute infarct. There is no evidence of hydrocephalus. ORBITS: The visualized portion of the orbits demonstrate no acute abnormality. SINUSES: The visualized paranasal sinuses and mastoid air cells demonstrate no acute abnormality. SOFT TISSUES/SKULL:  No acute abnormality of the visualized skull or soft tissues. No acute intracranial abnormality. Cta Head Neck W Contrast    Result Date: 7/14/2020  EXAMINATION: CTA OF THE HEAD AND NECK WITH CONTRAST, 7/14/2020 5:36 pm TECHNIQUE: CTA of the head and neck was performed with the administration of intravenous contrast. Multiplanar reformatted images are provided for review. MIP images are provided for review. Stenosis of the internal carotid arteries measured using NASCET criteria. Dose modulation, iterative reconstruction, and/or weight based adjustment of the mA/kV was utilized to reduce the radiation dose to as low as reasonably achievable. 3D reconstructed images were performed on a separate workstation and provided for review. COMPARISON: None HISTORY: ORDERING SYSTEM PROVIDED HISTORY: Headache, h/o carotid stenosis TECHNOLOGIST PROVIDED HISTORY: Reason for exam:->Headache, h/o carotid stenosis Reason for Exam: Headache, h/o carotid stenosis . Acuity: Acute Type of Exam: Initial FINDINGS: CTA NECK: AORTIC ARCH/ARCH VESSELS: No dissection or arterial injury. No significant stenosis of the brachiocephalic or subclavian arteries. CAROTID ARTERIES: The common carotid arteries are patent.  There is a focal severe, greater than 90% stenosis of the proximal left internal carotid artery due to calcified and noncalcified plaque. Reduced caliber of the artery distal to the stenosis is likely due to partial lumen collapse from reduced blood flow. At the midportion of the cervical segments the right carotid measures 4.4 mm and the left measures 3.1 mm. The right internal carotid artery is patent. VERTEBRAL ARTERIES: No dissection, arterial injury, or significant stenosis. SOFT TISSUES: The lung apices are clear. No cervical or superior mediastinal lymphadenopathy. The larynx and pharynx are unremarkable. No acute abnormality of the salivary and thyroid glands. BONES: No acute osseous abnormality. CTA HEAD: ANTERIOR CIRCULATION: No significant stenosis of the intracranial internal carotid, anterior cerebral, or middle cerebral arteries. No aneurysm. POSTERIOR CIRCULATION: No significant stenosis of the vertebral, basilar, or posterior cerebral arteries. No aneurysm. OTHER: No dural venous sinus thrombosis on this non-dedicated study. BRAIN: No mass effect or midline shift. No extra-axial fluid collection. The gray-white differentiation is maintained. Near-total occlusion of the proximal left internal carotid artery without full distal lumen collapse. RECOMMENDATIONS: Leonor Mcgarry Carotid Near-Occlusion: A Comprehensive Review, Part 1--Definition, Terminology, and Diagnosis. AJNR Am J Neuroradiol. 2016 Jan;37(1):2-10. doi: 10.3174/ajnr. . Epub 2015 Aug 27. Review. PubMed PMID: 07764611. [employer]Ro[employer].es. org/content/ajnr/early/2015/09/24/ajnr. .full. pdf       Discharge Medications:       Quincy Cordon   Home Medication Instructions [id number]    Printed on:07/21/20 1125   Medication Information                      acetaminophen (TYLENOL) 500 MG tablet  Take 1,000 mg by mouth every 6 hours as needed for Pain             ALPRAZolam (XANAX) 0.5 MG tablet  Take 0.5 mg by mouth 3 times daily as needed for Sleep.               amLODIPine (NORVASC) 10 MG tablet  Take 10 mg by mouth daily

## 2020-07-21 NOTE — PROGRESS NOTES
MD Mayco Mckinley MD Zaida Kida, MD                  Office: [phone]                 Fax: [phone]         [address] Note      PATIENT NAME: Emilee Arambula  : 1960  MRN: [id number]     Assessment/Plan:  1. Hyponatremia, unclear duration. At goal.  Needed D5W to slow down correction. Etiology probably secondary to decreased salt intake plus hypotension plus HCTZ. Rate of correction now adequate. UOsm 206  Saranya less than 20  TSH WNL  Will continue outpt f/u    2. Hypotension. Hold off on antihypertensives. IVF bolus then MIVF  +  Increase Midodrine now. ** pending random cortisol level. Although BP is much better, so unlikely adrenal insufficiency     3. Renal function is within normal limits. 4.  History of severe left ICA stenosis- Vascular surgery following. S/p CEA on .    5.  History of hepatitis C. Normal renal function. UA only showed trace blood  6. Headache- w/u per Medicine and Neurology  7. Stable from renal perspective. Follow BP closely. High complexity. Multiple medical problems. Discussed with patient and treatment team.  ,   Thank you for allowing me to participate in this patient's care. Please do not hesitate to contact me for any questions/concerns. We will follow along with you. Radha Singleton MD  [facility] [address] [place] [address]   Phone: [phone] or Via [employer]  Fax: [phone]        Patient Active Problem List   Diagnosis    Hyponatremia    Hypokalemia    Internal carotid artery occlusion, left    Leukopenia    Hepatitis C    HTN (hypertension)       Past Medical History:   has a past medical history of Anxiety, Arthritis, Hepatitis C, Hyperlipidemia, Hypertension, and Reflux esophagitis. Past Social History:   reports that she has been smoking cigarettes. She has been smoking about 0.50 packs per day. She has never used smokeless tobacco. She reports current alcohol use. She reports that she does not use drugs.       Subjective:    s/p  Left carotid endarterectomy yesterday   seen resting in bed  No complaints  BP better        Objective:      BP (!) 153/73   Pulse 74   Temp 98.1 °F (36.7 °C) (Temporal)   Resp 17   Ht 5' 3\" (1.6 m)   Wt 133 lb 6.1 oz (60.5 kg)   SpO2 97%   BMI 23.63 kg/m²     Wt Readings from Last 3 Encounters:   07/21/20 133 lb 6.1 oz (60.5 kg)   06/23/20 134 lb 9.6 oz (61.1 kg)       BP Readings from Last 3 Encounters:   07/21/20 (!) 153/73   07/20/20 (!) 174/79   06/23/20 90/68     Chest- clear  Heart-regular  Abd-soft  Ext- no edema    Labs  Hemoglobin   Date Value Ref Range Status   07/21/2020 10.1 (L) 12.0 - 16.0 g/dL Final     Hematocrit   Date Value Ref Range Status   07/21/2020 30.7 (L) 36.0 - 48.0 % Final     WBC   Date Value Ref Range Status   07/21/2020 13.5 (H) 4.0 - 11.0 K/uL Final     Platelets   Date Value Ref Range Status   07/21/2020 232 135 - 450 K/uL Final     Lab Results   Component Value Date    CREATININE 0.9 07/19/2020    BUN 15 07/19/2020     07/19/2020    K 4.4 07/19/2020     07/19/2020    CO2 22 07/19/2020

## 2020-07-21 NOTE — PROGRESS NOTES
Discharge instructions reviewed with patient. Patient verbalized understanding. All home medications have been reviewed, questions answered and patient voiced understanding. All medication side effects reviewed and patient verbalized understanding. Patient given prescriptions, filled at outpatient pharmacy. Given discharge instructions and follow up appointment times. Patient discharged to home with significant other via private car. Taken to lobby via wheelchair by this RN. Follow up appointment(s) reviewed with patient and all attempts made to schedule within 7 days of discharge. [facility]  Depression Screen    1. During the past month, have you often been bothered by feeling down, depressed, or hopeless? Currently on antidepressant medication    2. During the past month, have you often been bothered by little interest or pleasure in       doing things?    Currently on antidepressant medication

## 2020-07-21 NOTE — PROGRESS NOTES
07/21/2020    MCV 92.5 07/21/2020    RDW 14.1 07/21/2020     07/21/2020     Lab Results   Component Value Date    INR 0.95 07/19/2020    PROTIME 11.0 07/19/2020        Scheduled Meds:    sodium chloride flush  10 mL Intravenous 2 times per day    midodrine  2.5 mg Oral BID WC    nicotine  1 patch Transdermal Daily    escitalopram  10 mg Oral Daily    fluticasone  1 spray Each Nostril Daily    pantoprazole  40 mg Oral BID AC    atorvastatin  10 mg Oral Nightly    tiZANidine  4 mg Oral TID    traZODone  150 mg Oral Nightly    sodium chloride flush  10 mL Intravenous 2 times per day    aspirin  325 mg Oral Daily    clopidogrel  75 mg Oral Daily     Continuous Infusions:         Assessment:  Left carotid stenosis- s/p left carotid endarterectomy - POD #1  HTN- controlled  HLD- on statin  Hyponatremia- resolved   Acute blood loss anemia post operatively - as expected following surgery: H/H stable  Tobacco use    Plan:   Discontinue arterial line  Discontinue IVF  Advance diet   Increase activity  Continue home medications  Continue ASA, plavix and statin  Smoking cessation   Okay to discharge home from vascular standpoint  Follow up with Dr. Belia Heimlich in 2-3 weeks  Discharge instructions reviewed with patient       Patient educated on plan of care and disease process. All questions answered.         Electronically signed by ROOSEVELT Ramos CNP on 7/21/2020 at 9:33 AM

## 2020-07-30 RX ORDER — OXYCODONE HYDROCHLORIDE AND ACETAMINOPHEN 5; 325 MG/1; MG/1
1 TABLET ORAL EVERY 6 HOURS PRN
Qty: 14 TABLET | Refills: 0 | Status: SHIPPED | OUTPATIENT
Start: 2020-07-30 | End: 2020-08-06

## 2020-08-11 ENCOUNTER — OFFICE VISIT (OUTPATIENT)
Dept: VASCULAR SURGERY | Age: 60
End: 2020-08-11

## 2020-08-11 VITALS
TEMPERATURE: 97.2 F | WEIGHT: 134 LBS | HEIGHT: 63 IN | SYSTOLIC BLOOD PRESSURE: 118 MMHG | DIASTOLIC BLOOD PRESSURE: 68 MMHG | BODY MASS INDEX: 23.74 KG/M2

## 2020-08-11 PROCEDURE — 99024 POSTOP FOLLOW-UP VISIT: CPT | Performed by: SURGERY

## 2020-08-11 NOTE — PROGRESS NOTES
[facility]) Vascular and Endovascular Surgery     Referring Provider:  Landen White, APRN-CNP, APRN - NP           Post Op Note:  Carotid Endarterectomy    Subjective: Patient  Is status post left carotid endarterectomy on July 20, 2020 and notes. .  She is here for her routine postoperative visit today. She is doing very well and has no complaints today. .  She is still not smoking. Objective:  /68   Temp 97.2 °F (36.2 °C)   Ht 5' 3\" (1.6 m)   Wt 134 lb (60.8 kg)   BMI 23.74 kg/m²   Incision:  Left Neck - healing well, no erythema, no swelling, well approximated{Desc;   Neuro:      No focal deficits  Vascular: Strong left superficial temporal artery pulse    Assessment:  1. Doing well post carotid endarterectomy      Plan:  1. F/U appointment 6 months  2.   Begin surveillance duplex scanning - schedule for scans at  6 months

## 2020-09-02 ENCOUNTER — OFFICE VISIT (OUTPATIENT)
Dept: ENT CLINIC | Age: 60
End: 2020-09-02

## 2020-09-02 VITALS — HEART RATE: 60 BPM | SYSTOLIC BLOOD PRESSURE: 120 MMHG | TEMPERATURE: 98 F | DIASTOLIC BLOOD PRESSURE: 74 MMHG

## 2020-09-02 PROCEDURE — 69210 REMOVE IMPACTED EAR WAX UNI: CPT | Performed by: OTOLARYNGOLOGY

## 2020-09-02 NOTE — PROGRESS NOTES
Chief Complaint   Patient presents with    Ear Problem       HISTORY:    Lex Pollard stated that the right ear is plugged with wax and hearing is decreased in the right ear. She was recently seen by her primary care physician who was unable to clean the right ear. She was told that she had a left ear infection and was started on azithromycin. She started this medication this morning. EXAMINATION:    The right EAC was occluded by cerumen impaction, obscuring visualization of the right TM. The left TM and EAC appeared to be normal normal.       PROCEDURE - REMOVAL OF RIGHT CERUMEN IMPACTION: :   The cerumen impaction occluding the right EAC was removed, under otomicroscopy visualization, with instrumentation, using a Billeau wire loop. After successful cerumen removal, the right EAC appeared to be normal and clear without mass, exudate, or edema. The right tympanic membrane appeared to be normal.  Nonobstructive cerumen accumulation was removed from the left external auditory canal which was otherwise normal.  There was no evidence of acute disease. Lex Pollard reported improved hearing, back to usual normal level, after cerumen removal.          HEARING ASSESSMENT (after removal of cerumen impaction):  Finger rub:  Able to hear finger rub bilaterally. Tuning fork tests, [address] tuning fork:  Ricardo was heard \"all over. \"  Rinne air > bone bilaterally. IMPRESSION / Raegan Ramirez / Carlos Lua / PROCEDURES:       Lex Pollard was seen today for ear problem. Diagnoses and all orders for this visit:    Impacted cerumen of right ear    Conductive hearing loss of right ear with unrestricted hearing of left ear         RECOMMENDATIONS / PLAN:   1. Finish current antibiotics. 2. Return for recurrence of excessive ear wax symptoms, or any further Ear Nose Throat or Sinus problems.

## 2021-02-09 ENCOUNTER — HOSPITAL ENCOUNTER (OUTPATIENT)
Dept: VASCULAR LAB | Age: 61
Discharge: HOME | End: 2021-02-09
Payer: COMMERCIAL

## 2021-02-09 DIAGNOSIS — I65.23 CAROTID ATHEROSCLEROSIS, BILATERAL: ICD-10-CM

## 2021-02-09 PROCEDURE — 93880 EXTRACRANIAL BILAT STUDY: CPT

## 2021-02-10 ENCOUNTER — TELEPHONE (OUTPATIENT)
Dept: VASCULAR SURGERY | Age: 61
End: 2021-02-10

## 2021-02-10 DIAGNOSIS — I65.23 CAROTID ATHEROSCLEROSIS, BILATERAL: Primary | ICD-10-CM

## 2021-02-10 NOTE — TELEPHONE ENCOUNTER
Discussed results of carotid duplex with patient who is s/p left CEA in July 2020. Duplex shows less than 50% stenosis of bilateral ICAs. Plan to repeat carotid duplex in 1 year. Will cancel apt for 2/16.     Electronically signed by ROOSEVELT Vigil CNP on 2/10/2021 at 10:14 AM

## 2021-07-21 ENCOUNTER — OFFICE VISIT (OUTPATIENT)
Dept: ENT CLINIC | Age: 61
End: 2021-07-21
Payer: COMMERCIAL

## 2021-07-21 VITALS
DIASTOLIC BLOOD PRESSURE: 70 MMHG | SYSTOLIC BLOOD PRESSURE: 128 MMHG | TEMPERATURE: 97.9 F | WEIGHT: 144 LBS | HEART RATE: 55 BPM | BODY MASS INDEX: 25.51 KG/M2

## 2021-07-21 DIAGNOSIS — H91.93 BILATERAL HEARING LOSS, UNSPECIFIED HEARING LOSS TYPE: ICD-10-CM

## 2021-07-21 DIAGNOSIS — H93.13 TINNITUS OF BOTH EARS: Primary | ICD-10-CM

## 2021-07-21 PROCEDURE — 99213 OFFICE O/P EST LOW 20 MIN: CPT | Performed by: OTOLARYNGOLOGY

## 2021-07-21 PROCEDURE — 92504 EAR MICROSCOPY EXAMINATION: CPT | Performed by: OTOLARYNGOLOGY

## 2021-07-21 ASSESSMENT — ENCOUNTER SYMPTOMS
STRIDOR: 0
COLOR CHANGE: 0
COUGH: 0
TROUBLE SWALLOWING: 0
PHOTOPHOBIA: 0
SORE THROAT: 0
DIARRHEA: 0
EYE REDNESS: 0
VOICE CHANGE: 0
FACIAL SWELLING: 0
CHOKING: 0
SHORTNESS OF BREATH: 0
RHINORRHEA: 0
SINUS PAIN: 0
EYE PAIN: 0
EYE ITCHING: 0
NAUSEA: 0
SINUS PRESSURE: 0

## 2021-07-21 NOTE — PROGRESS NOTES
[facility]  [address] [address] [facility], [address] [place] [address] Cynthia  P: [phone]  F: [phone]       Patient     Catia Willis  1960    ChiefComplaint     Chief Complaint   Patient presents with    New Patient     Patient is here today because she has ringing and buzzing in both ears, has not had a hearing test in years       History of Present Illness     Catia Willis is a pleasant 56 y.o. female who presents as new patient today for tinnitus hearing loss. She was recently seen by a colleague of mine at another facility for cerumen impaction. She has been experiencing tinnitus and decreased hearing gradually for the past 3 to 5 years. She has had audiogram in the past many years ago, but does not have a copy or remember the results. She describes the ringing as high-frequency nonpulsatile noise in both ears. It does not cause her to have difficulty sleeping. Denies any significant history of loud noise exposure. Denies a history of ear infections or ear surgeries. Denies otalgia, otorrhea, spinning sensation. Denies a family history of ear problems. Past Medical History     Past Medical History:   Diagnosis Date    Anxiety     Arthritis     Hepatitis C     Hyperlipidemia     Hypertension     Reflux esophagitis        Past Surgical History     Past Surgical History:   Procedure Laterality Date    CAROTID ENDARTERECTOMY Left 7/20/2020    LEFT CAROTID ENDARTERECTOMY WITH INTRAOPERATIVE DUPLEX SCAN performed by Carlos Goetz MD at [address]      left arm    JOINT REPLACEMENT      knee       Family History     No family history on file. Social History     Social History     Socioeconomic History    Marital status:       Spouse name: Not on file    Number of children: Not on file    Years of education: Not on file    Highest education level: Not on file   Occupational History    Not on file   Tobacco Use    Smoking status: Former Smoker     Packs/day: 0.50     Types: Cigarettes     Quit date: 2020     Years since quittin.0    Smokeless tobacco: Never Used   Vaping Use    Vaping Use: Never used   Substance and Sexual Activity    Alcohol use: Yes     Comment: soc    Drug use: Never    Sexual activity: Not on file   Other Topics Concern    Not on file   Social History Narrative    Not on file     Social Determinants of Health     Financial Resource Strain:     Difficulty of Paying Living Expenses:    Food Insecurity:     Worried About Running Out of Food in the Last Year:     [address] in the Last Year:    Transportation Needs:     Lack of Transportation (Medical):  Lack of Transportation (Non-Medical):    Physical Activity:     Days of Exercise per Week:     Minutes of Exercise per Session:    Stress:     Feeling of Stress :    Social Connections:     Frequency of Communication with Friends and Family:     Frequency of Social Gatherings with Friends and Family:     Attends [employer] Services:     Active Member of Clubs or Organizations:     Attends Club or Organization Meetings:     Marital Status:    Intimate Partner Violence:     Fear of Current or Ex-Partner:     Emotionally Abused:     Physically Abused:     Sexually Abused:         Allergies     Allergies   Allergen Reactions    Penicillins Nausea And Vomiting and Hives    Hydrochlorothiazide      Hyponatremia    Nut  [Macadamia Nut Oil] Hives    Peanut (Diagnostic) Hives and Swelling    Peanut-Containing Drug Products Hives       Medications     Current Outpatient Medications   Medication Sig Dispense Refill    clopidogrel (PLAVIX) 75 MG tablet Take 1 tablet by mouth daily 30 tablet 3    calcium citrate-vitamin d (CALCIUM CITRATE + D) 250-200 MG-UNIT TABS Take by mouth      tiZANidine (ZANAFLEX) 4 MG capsule Take 4 mg by mouth 3 times daily      loratadine (CLARITIN) 10 MG capsule Take 10 mg by mouth daily      fluticasone (FLONASE) 50 MCG/ACT nasal spray 1 spray by Each Nostril route daily      ofloxacin (FLOXIN) 0.3 % otic solution Place 5 drops in ear(s) daily      acetaminophen (TYLENOL) 500 MG tablet Take 1,000 mg by mouth every 6 hours as needed for Pain      lisinopril (PRINIVIL;ZESTRIL) 20 MG tablet Take 20 mg by mouth daily 12.5mg      ALPRAZolam (XANAX) 0.5 MG tablet Take 0.5 mg by mouth 3 times daily as needed for Sleep.  aspirin 81 MG chewable tablet Take 81 mg by mouth daily      amLODIPine (NORVASC) 10 MG tablet Take 10 mg by mouth daily      metoprolol succinate (TOPROL XL) 50 MG extended release tablet Take 100 mg by mouth 2 times daily Only take evening dose if BP is high      traZODone (DESYREL) 100 MG tablet Take 150 mg by mouth nightly       escitalopram (LEXAPRO) 10 MG tablet Take 10 mg by mouth daily      pantoprazole sodium (PROTONIX) 40 MG PACK packet Take 40 mg by mouth 2 times daily (before meals)       simvastatin (ZOCOR) 20 MG tablet Take 20 mg by mouth nightly       No current facility-administered medications for this visit. Review of Systems     Review of Systems   Constitutional: Negative for chills, fatigue and fever. HENT: Positive for hearing loss and tinnitus. Negative for congestion, ear discharge, ear pain, facial swelling, nosebleeds, postnasal drip, rhinorrhea, sinus pressure, sinus pain, sneezing, sore throat, trouble swallowing and voice change. Eyes: Negative for photophobia, pain, redness, itching and visual disturbance. Respiratory: Negative for cough, choking, shortness of breath and stridor. Gastrointestinal: Negative for diarrhea and nausea. Musculoskeletal: Negative for neck pain and neck stiffness. Skin: Negative for color change and rash. Neurological: Negative for dizziness, facial asymmetry and light-headedness. Hematological: Negative for adenopathy. Psychiatric/Behavioral: Negative for agitation and confusion.          PhysicalExam Vitals:    07/21/21 1359   BP: 128/70   Pulse: 55   Temp: 97.9 °F (36.6 °C)       Physical Exam  Constitutional:       Appearance: She is well-developed. HENT:      Head: Normocephalic and atraumatic. Jaw: No trismus. Right Ear: Tympanic membrane, ear canal and external ear normal. No drainage. No middle ear effusion. Tympanic membrane is not perforated. Left Ear: Tympanic membrane, ear canal and external ear normal. No drainage. No middle ear effusion. Tympanic membrane is not perforated. Nose: No septal deviation, mucosal edema or rhinorrhea. Mouth/Throat:      Dentition: Normal dentition. Pharynx: Uvula midline. No oropharyngeal exudate. Eyes:      General: No scleral icterus. Right eye: No discharge. Left eye: No discharge. Pupils: Pupils are equal, round, and reactive to light. Neck:      Thyroid: No thyromegaly. Trachea: Phonation normal. No tracheal deviation. Pulmonary:      Effort: Pulmonary effort is normal. No respiratory distress. Breath sounds: No stridor. Musculoskeletal:      Cervical back: Neck supple. Lymphadenopathy:      Cervical: No cervical adenopathy. Skin:     General: Skin is warm and dry. Neurological:      Mental Status: She is alert and oriented to person, place, and time. Cranial Nerves: No cranial nerve deficit. Psychiatric:         Behavior: Behavior normal.           Procedure     Otomicroscopy    An operating microscope was utilized to visualize the external auditory canals using a 4mm speculum. The external auditory canals are clear. The tympanic membrane is intact. Ossicles appear intact. No fluid visualized in the middle ear. Assessment and Plan     1. Tinnitus of both ears  Patient with 5 years of worsening tinnitus and hearing loss. No significant history regarding the ears.   Recommend audiogram.  I will contact patient with results of the audiogram.  - Kelsey Toth, DIGNA, Audiology, [facility]    2. Bilateral hearing loss, unspecified hearing loss type    - [address] [facility],[address], [place]. KENIA, Audiology, [facility]      Return if symptoms worsen or fail to improve. Portions of this note were dictated using Dragon.  There may be linguistic errors secondary to the use of this program.

## 2021-07-29 NOTE — PROGRESS NOTES
Amada Holman   1960, 56 y.o. female   [id number]       Referring Provider: Joe Mccabe DO  Referral Type: In an order in [address] [place] [address]    Reason for Visit: Evaluation of suspected change in hearing, tinnitus, or balance. ADULT AUDIOLOGIC EVALUATION      Amada Holman is a 56 y.o. female seen today, 8/4/2021, for an initial audiologic evaluation. AUDIOLOGIC AND OTHER PERTINENT MEDICAL HISTORY:        Amada Holman noted decreased hearing bilaterally, gradual, asks for repetition, feels both ears hear about the same; tinnitus bilaterally, present for a few years, constant; some noise exposure - works at [employer] and can be loud at times. Amada Holman denied otalgia, aural fullness, otorrhea, dizziness, imbalance, history of head trauma, history of ear surgery, and family history of hearing loss. IMPRESSIONS:       Today's results are consistent with bilateral sensorineural hearing loss with notch configuration in the high frequencies with normal middle ear function and excellent word recognition for typical conversational speech bilaterally. Hearing loss is significant enough to result in difficulty understanding speech in most listening environments. Discussed good communication strategies, tinnitus management strategies, and considerations for amplification. Follow medical recommendations from Dr. Jossy Bain. ASSESSMENT AND FINDINGS:       Otoscopy revealed: Clear ear canals bilaterally      RIGHT EAR:  Hearing Sensitivity: Mild through 2000 Hz precipitously sloping to moderate sensorineural hearing loss notch at 4000 Hz, then rising to mild sensorineural hearing loss. Speech Recognition Threshold: 25 dBHL  Word Recognition: Excellent (96%), based on NU-6 25-word list at 55 dBHL using recorded speech stimuli. Tympanometry: Normal peak pressure and compliance, Type A tympanogram, consistent with normal middle ear function.       LEFT EAR:  Hearing Sensitivity: Mild through 3000 Hz precipitously sloping to moderate sensorineural hearing loss notch 1342-2617 Hz, then rising to mild sensorineural hearing loss. Speech Recognition Threshold: 25 dBHL  Word Recognition: Excellent (96%), based on NU-6 25-word list at 55 dBHL using recorded speech stimuli. Tympanometry: Normal peak pressure and compliance, Type A tympanogram, consistent with normal middle ear function. Reliability: Good  Transducer: Inserts    See scanned audiogram dated 8/4/2021 for results. PATIENT EDUCATION:       The following items were discussed with the patient:    - Good Communication Strategies   - Hearing Loss and Hearing Aids   - Tinnitus Management Strategies    - Noise-Induced Hearing Loss and use of Hearing Protection Devices (HPDs)    Educational information was shared in the After Visit Summary. RECOMMENDATIONS:                                                                                                                                                                                                                                                                      The following items are recommended based on patient report and results from today's appointment:   - Continue medical follow-up with Kevin Flynn DO.   - Retest hearing as medically indicated and/or sooner if a change in hearing is noted. - If desired, schedule a Hearing Aid Evaluation (HAE) appointment to discuss hearing aid options. Recommended she contact her insurance provider to determine if there is a possible benefit for hearing aids. - Utilize \"Good Communication Strategies\" as discussed to assist in speech understanding with communication partners. - Maintain a sound enriched environment to assist in the management of tinnitus symptoms.     - Use hearing protection devices (HPDs), such as protective ear muffs and ear plugs, when exposed to dangerous sound levels. [facility] [place], [place]  Audiologist      Chart CC'd to:  Ran Carballo,       Degree of   Hearing Sensitivity dB Range   Within Normal Limits (WNL) 0 - 20   Mild 20 - 40   Moderate 40 - 55   Moderately-Severe 55 - 70   Severe 70 - 90   Profound 90 +

## 2021-08-04 ENCOUNTER — PROCEDURE VISIT (OUTPATIENT)
Dept: AUDIOLOGY | Age: 61
End: 2021-08-04
Payer: COMMERCIAL

## 2021-08-04 DIAGNOSIS — H93.13 TINNITUS, BILATERAL: ICD-10-CM

## 2021-08-04 DIAGNOSIS — H90.3 SENSORINEURAL HEARING LOSS, BILATERAL: Primary | ICD-10-CM

## 2021-08-04 PROCEDURE — 92557 COMPREHENSIVE HEARING TEST: CPT | Performed by: AUDIOLOGIST

## 2021-08-04 PROCEDURE — 92567 TYMPANOMETRY: CPT | Performed by: AUDIOLOGIST

## 2021-08-04 NOTE — PATIENT INSTRUCTIONS
and similar products such as ibuprofen or naproxen. · Get exercise often. It can help improve blood flow to the ear. Ways to manage/cope with tinnitus  Some tinnitus may last a long time. To manage your tinnitus, try to:  · Avoid noises that you think caused your tinnitus. If you can't avoid loud noises, wear earplugs or earmuffs. · Ignore the sound by paying attention to other things. Keeping your brain busy with other tasks or background noise can help your brain not focus on the tinnitus. · Try to not give the tinnitus an emotional reaction. Do your best to ignore the sound and not let it bother you. Relax using biofeedback, meditation, or yoga. Feeling stressed and being tired can make tinnitus worse. · Play music or white noise to help you sleep. Background noise may cover up the noise that you hear in your ears. You can buy a tabletop machine or a device that sits under your pillow to play soothing sounds, like ocean waves. · Smart phones have free apps, such as Whist, Relax Melodies, ReSound Relief, and White Noise Lite. These apps have different types of sounds/noise, some of which you can blend together to find sounds that are most soothing to you. · Hearing aid technology, especially when there is some hearing loss, may help reduce tinnitus symptoms by giving your brain better access to the sounds it is missing. There are some hearing aids with built-in noise generator programs, which may help when amplification alone is not enough. Additional resources may be found through the American [employer]inn[employer] Association at www.dori.org    When should you call for help? Call 911 anytime you think you may need emergency care. For example, call if:    · You have symptoms of a stroke. These may include:  ? Sudden numbness, tingling, weakness, or loss of movement in your face, arm, or leg, especially on only one side of your body. ? Sudden vision changes. ? Sudden trouble speaking.   ? Sudden confusion or trouble understanding simple statements. ? Sudden problems with walking or balance. ? A sudden, severe headache that is different from past headaches. Call your doctor now or seek immediate medical care if:    · You develop other symptoms. These may include hearing loss (or worse hearing loss), balance problems, dizziness, nausea, or vomiting. Watch closely for changes in your health, and be sure to contact your doctor if:    · Your tinnitus moves from both ears to one ear. · Your hearing loss gets worse within 1 day after an ear injury. · Your tinnitus or hearing loss does not get better within 1 week after an ear injury. · Your tinnitus bothers you enough that you want to take medicines to help you cope with it. If you notice changes in your tinnitus and/or your hearing, it is recommended that you have your hearing tested by your audiologist and to follow-up with your physician that manages your hearing loss (such as your ENT or Primary Care doctor). Hearing Loss: Care Instructions  Your Care Instructions      Hearing loss is a sudden or slow decrease in how well you hear. It can range from mild to profound. Permanent hearing loss can occur with aging, and it can happen when you are exposed long-term to loud noise. Examples include listening to loud music, riding motorcycles, or being around other loud machines. Hearing loss can affect your work and home life. It can make you feel lonely or depressed. You may feel that you have lost your independence. But hearing aids and other devices can help you hear better and feel connected to others. Follow-up care is a key part of your treatment and safety. Be sure to make and go to all appointments, and call your doctor if you are having problems. It's also a good idea to know your test results and keep a list of the medicines you take. How can you care for yourself at home? · Avoid loud noises whenever possible.  This helps keep your hearing from getting worse. Always wear hearing protection around loud noises. · If appropriate, wear hearing aid(s) as directed. It is recommended that hearing aids are worn during all waking hours to keep your brain active and give it access to the sounds it is missing. · If you are beginning your process with hearing aid(s), schedule a \"Hearing Aid Evaluation\" with an audiologist to discuss your lifestyle, features of hearing aid technology, and styles of hearing aids available. It is recommended that you contact your insurance company to determine if you have a hearing aid benefit, as this may dictate who you can see for these services. · Have hearing tests as your doctor suggests. They can show whether your hearing has changed. Your hearing aid may need to be adjusted. · Use other assistive devices as needed. These may include:  ? Telephone amplifiers and hearing aids that can connect to a television, stereo, radio, or microphone. ? Devices that use lights or vibrations. These alert you to the doorbell, a ringing telephone, or a baby monitor. ? Television closed-captioning. This shows the words at the bottom of the screen. Most new TVs can do this. ? TTY (text telephone). This lets you type messages back and forth on the telephone instead of talking or listening. These devices are also called TDD. When messages are typed on the keyboard, they are sent over the phone line to a receiving TTY. The message is shown on a monitor. · Use pagers, fax machines, text, and email if it is hard for you to communicate by telephone. · Try to learn a listening technique called speech-reading. It is not lip-reading. You pay attention to people's gestures, expressions, posture, and tone of voice. These clues can help you understand what a person is saying. Face the person you are talking to, and have him or her face you. Make sure the lighting is good.  You need to see the other person's face clearly. · Think about counseling if you need help to adjust to your hearing loss. When should you call for help? Watch closely for changes in your health, and be sure to contact your doctor if:    · You think your hearing is getting worse. · You have new symptoms, such as dizziness or nausea. Good Communication Strategies    Communication can be challenging for anyone, but can be especially difficult for those with some degree of hearing loss. While we may not be able to control every factor that may lead to difficulty with communication, there are Good Communication Strategies that we can all use in our day-to-day lives. Communication takes both parties working together for it to be successful. Tips as a Listener:   1. Control your environment. It is important to limit the amount of background noise in the room when possible. You should also consider having a good light source in the room to best see the other person. 2. Ask for clarification. Instead of saying \"What?\", you can use parts of what you heard to make a new question. For example, if you heard the word \"Thursday\" but not the rest of the week, you may ask \"What was that about Thursday? \" or \"What did you want to do Thursday? \". This shows the person talking that you are listening and will help them better explain what they are saying. 3. Be an advocate for yourself. If you are hearing but not understanding, tell the other person \"I can hear you, but I need you to slow down when you speak. \"  Or if someone is facing the other direction, say \"I cannot hear you when you are not looking at me when we talk. \"       Tips as a Talker:   - Sit or stand 3 to 6 feet away to maximize audibility         -- It is unrealistic to believe someone else will fully hear your message if you are speaking from across the room or in a different room in the house   - Stay at eye level to help with visual cues   - Make sure you have the persons attention before speaking   - Use facial expressions and gestures to accentuate your message   - Raise your voice slightly (do not scream)   - Speak slowly and distinctly   - Use short, simple sentences   - Rephrase your words if the person is having a hard time understanding you    - To avoid distortion, dont speak directly into a persons ear      Some additional items that may be helpful:   - Remain patient - this is important for both parties   - Write down items that still cannot be heard/understood. You may write with pen/paper or consider typing/texting on a cell phone or smart device. - If background noise is unavoidable, try to keep yourself in a good position in the room. By sitting at a coelho on the side of the restaurant (preferably a corner), it will be easier to communicate than if you were sitting at a table in the middle with background noise surrounding you. Keep yourself positioned away from music speakers or heavy foot traffic.   - If you have difficulty with the television, consider these options:      -- Use closed-captioning, which is a setting you can turn on that displays the spoken words in a written form on the screen. There may be a slight delay, but this can help fill in missing information. This can be especially helpful when watching programs with accented speech. -- Consider use of a sound bar or speakers that come from the front of the TV. With modern flat screen TVs, many of them have speakers that come out of the back of the device, which makes sound bounce off the wall behind it, then go into the room. Sound bars can allow the sound to go straight in your direction and can improve sound quality. -- Consider ear level devices to help improve the volume and/or sound quality of the program.  There are devices that work like headphones that you can adjust the volume for your ears while others can have the volume at a more comfortable level, such as \"TV Ears\".   Most hearing aids have devices that allow them to connect directly to the TV and improve sound quality. Noise-Induced Hearing Loss  What it is, and what you can do to prevent it    Exposure to loud sounds, in an occupational setting or recreational, can cause permanent hearing loss. Sound is measured in decibels (dB). Noise-induced hearing loss is the ONLY type of preventable hearing loss. Hearing loss related to noise exposure can occur at any age. There are small sensory cells, called inner and outer hair cells, within the inner ear (cochlea). These cells process the loudness (intensity) and pitch (frequency) of sound and send the signal to the brain via our auditory nerve (vestibulocochlear nerve, cranial nerve VIII). When these cells are damaged, they can result in permanent hearing loss and/or tinnitus. The hair cells responsible for high frequency sounds, like birds chirping, are most likely to be damaged due to loud sounds. The high frequency sounds are also very important for our clarity and understanding of speech. OCCUPATIONAL NOISE EXPOSURE RECREATIONAL NOISE EXPOSURE   Some jobs may have exposure to loud sounds in the workplace. These jobs may include but are not limited to:  [employer]   Welding   Landscaping   Hairdressing/hairstyling   [facility]  [employer]    ... And more! Many activities outside of work may cause permanent hearing loss. These activities may include but are not limited to:  Lawnmowers, leaf blowers  [employer] (such as pigs squealing)   Chainsaws and other power tools  [employer] musical instruments and/or singing   Listening to music too loudly - at concerts, through stereo, through ear buds or headphones   Attending sporting events   Attending fireworks shows or using fireworks at home  [employer] of firearms   . .. And more!        REDUCE OR PROTECT YOUR EARS FROM NOISE EXPOSURE    To do your best to avoid noise-induced hearing loss, here are some tips:   Limit exposure to loud sounds. 85 dB (decibels) is safe for 8 hours. As sounds are louder, the length of time the sound is safe lessens. These numbers are cumulative across a 24-hour period. (NIOSH and CDC, 2002)  o 85 dB is safe for 8 hours  o 88 dB is safe for 4 hours  o 91 dB is safe for 2 hours  o 94 dB is safe for 1 hour  o 97 dB is safe for 30 minutes  o 100 dB is safe for 15 minutes  o 103 dB is safe for 7.5 minutes  o 106 dB is safe for 3.75 minutes  o 109 dB is safe for LESS THAN 2 minutes  o 112 dB is safe for LESS THAN 1 minute  o 115 dB is safe for ~ 30 seconds  o 130 dB can cause IMMEDIATE hearing loss   If you are unsure if a sound is too loud, consider checking the sound level with a \"sound level meter\". There are apps on smart devices, such as \"Decibel X\", that can measure the loudness of the sound. They are not as accurate as expensive equipment used by scientists, but it will give you a guesstimate of how loud the sound is, and if it may be damaging to your hearing.  If you cannot avoid loud sounds, here are ways to reduce your exposure:  o 1. Wear hearing protection  - Ear plugs and protective ear muffs can be used to reduce the intensity of the sound. The higher the NRR (noise reduction rating), the better reduction of the intensity of the sound   o 2. Turn the volume down  - When listening to music, turn the volume down, especially when wearing ear buds or headphones. A good rule of thumb is to not go beyond the middle setting on your device. If you can't hear someone talking to you from arm's length away, your music may be at a level that it can cause damage. If someone else can hear your music from 3 feet away, it may also be at a level that it can cause damage.   o 3. Walk away from the sound  - If you do not have the ability to wear hearing protection or turn down the volume of the sound, you should do your best to move away from the source of the sound. - Sound decreases in intensity as we move further from the source. The sound will decrease by 6 dB for every doubling of distance from the sound source. TYPES OF HEARING PROTECTION    The most common types of hearing protection are protective ear muffs and ear plugs. Protective ear muffs are commonly found at home improvement or sporting good stores, they can be worn time and time again and are great if you need to take your hearing protection off frequently. Ear plugs are often made of foam or soft silicone. The foam ones are designed for one-time use, while silicone ear plugs may be used multiple times. There are also \"filtered\" ear plugs that help provide even attenuation of the sound across all frequencies. These are great for listening to music or going to concerts, and allow for better understanding of speech in louder environments. They can be purchased at music stores or online retailers (search \"Ety Plugs\" or \"filtered ear plugs\"), or custom earmolds can be made with an audiologist.    There are \"custom\" hearing protection devices that you can further discuss with your audiologist based on your specific needs, if desired. Exposure to these sounds may cause permanent damage to your hearing.   If you suspect your hearing has changed, it is recommended that you have your hearing tested by your audiologist.

## 2021-08-04 NOTE — Clinical Note
Dr. Andrea Oconnell,    Please see note from this patient's audiogram from today. Please let me know if there is anything further you need.       [facility] [id number] [place]  Audiologist

## 2021-11-18 ENCOUNTER — TELEPHONE (OUTPATIENT)
Dept: SURGERY | Age: 61
End: 2021-11-18

## 2022-02-22 ENCOUNTER — OFFICE VISIT (OUTPATIENT)
Dept: VASCULAR SURGERY | Age: 62
End: 2022-02-22

## 2022-02-22 ENCOUNTER — PROCEDURE VISIT (OUTPATIENT)
Dept: VASCULAR SURGERY | Age: 62
End: 2022-02-22

## 2022-02-22 VITALS
BODY MASS INDEX: 27.01 KG/M2 | WEIGHT: 158.2 LBS | HEIGHT: 64 IN | SYSTOLIC BLOOD PRESSURE: 120 MMHG | DIASTOLIC BLOOD PRESSURE: 70 MMHG

## 2022-02-22 DIAGNOSIS — I65.23 CAROTID ATHEROSCLEROSIS, BILATERAL: ICD-10-CM

## 2022-02-22 DIAGNOSIS — I65.23 CAROTID ATHEROSCLEROSIS, BILATERAL: Primary | ICD-10-CM

## 2022-02-22 PROCEDURE — 93880 EXTRACRANIAL BILAT STUDY: CPT | Performed by: SURGERY

## 2022-02-22 PROCEDURE — 99213 OFFICE O/P EST LOW 20 MIN: CPT | Performed by: SURGERY

## 2022-02-22 NOTE — PROGRESS NOTES
[facility])   Vascular Surgery Followup    Referring Provider:  Tito Davis, APRN-CNP, APRN - NP     Chief Complaint   Patient presents with    Follow-up        History of Present Illness:  67-year-old female here today for follow-up of carotid atherosclerosis. Presents today for carotid duplex scan and to discuss results. History significant for left carotid endarterectomy in July 2020. She is doing well and has no complaints. Denies TIA stroke or amaurosis    Past Medical History:   has a past medical history of Anxiety, Arthritis, Hepatitis C, Hyperlipidemia, Hypertension, and Reflux esophagitis. Surgical History:   has a past surgical history that includes joint replacement; fracture surgery; and Carotid endarterectomy (Left, 7/20/2020). Social History:   reports that she quit smoking about 19 months ago. Her smoking use included cigarettes. She smoked 0.50 packs per day. She has never used smokeless tobacco. She reports current alcohol use. She reports that she does not use drugs. Family History:  family history is not on file. Home Medications:  Current Outpatient Medications   Medication Sig Dispense Refill    clopidogrel (PLAVIX) 75 MG tablet Take 1 tablet by mouth daily 30 tablet 3    calcium citrate-vitamin d (CALCIUM CITRATE + D) 250-200 MG-UNIT TABS Take by mouth      tiZANidine (ZANAFLEX) 4 MG capsule Take 4 mg by mouth 3 times daily      ofloxacin (FLOXIN) 0.3 % otic solution Place 5 drops in ear(s) daily      acetaminophen (TYLENOL) 500 MG tablet Take 1,000 mg by mouth every 6 hours as needed for Pain      ALPRAZolam (XANAX) 0.5 MG tablet Take 0.5 mg by mouth 3 times daily as needed for Sleep.        aspirin 81 MG chewable tablet Take 81 mg by mouth daily      amLODIPine (NORVASC) 10 MG tablet Take 10 mg by mouth daily      metoprolol succinate (TOPROL XL) 50 MG extended release tablet Take 100 mg by mouth 2 times daily Only take evening dose if BP is high  traZODone (DESYREL) 100 MG tablet Take 150 mg by mouth nightly       escitalopram (LEXAPRO) 10 MG tablet Take 10 mg by mouth daily      simvastatin (ZOCOR) 20 MG tablet Take 20 mg by mouth nightly      loratadine (CLARITIN) 10 MG capsule Take 10 mg by mouth daily (Patient not taking: Reported on 2/22/2022)      fluticasone (FLONASE) 50 MCG/ACT nasal spray 1 spray by Each Nostril route daily (Patient not taking: Reported on 2/22/2022)      lisinopril (PRINIVIL;ZESTRIL) 20 MG tablet Take 20 mg by mouth daily 12.5mg (Patient not taking: Reported on 2/22/2022)      pantoprazole sodium (PROTONIX) 40 MG PACK packet Take 40 mg by mouth 2 times daily (before meals)  (Patient not taking: Reported on 2/22/2022)       No current facility-administered medications for this visit. Allergies:  Penicillins, Hydrochlorothiazide, Nut  [macadamia nut oil], Peanut (diagnostic), and Peanut-containing drug products     Review of Systems:   · Constitutional: there has been no unanticipated weight loss. There's been no change in energy level, sleep pattern, or activity level. · Eyes: No visual changes or diplopia. No scleral icterus. · ENT: No Headaches, hearing loss or vertigo. No mouth sores or sore throat. · Cardiovascular: Reviewed in HPI  · Respiratory: No cough or wheezing, no sputum production. No hematemesis. · Gastrointestinal: No abdominal pain, appetite loss, blood in stools. No change in bowel or bladder habits. · Genitourinary: No dysuria, trouble voiding, or hematuria. · Musculoskeletal:  No gait disturbance, weakness or joint complaints. · Integumentary: No rash or pruritis. · Neurological: No headache, diplopia, change in muscle strength, numbness or tingling. No change in gait, balance, coordination, mood, affect, memory, mentation, behavior. · Psychiatric: No anxiety, no depression. · Endocrine: No malaise, fatigue or temperature intolerance. No excessive thirst, fluid intake, or urination. No tremor. · Hematologic/Lymphatic: No abnormal bruising or bleeding, blood clots or swollen lymph nodes. · Allergic/Immunologic: No nasal congestion or hives. Physical Examination:    Vitals:    02/22/22 1131   BP: 120/70          General appearance: alert, appears stated age, cooperative and no distress  Head: Normocephalic, without obvious abnormality, atraumatic  Neck: no adenopathy, no carotid bruit, no JVD, supple, symmetrical, trachea midline and thyroid: not enlarged, symmetric, no tenderness/mass/nodules  Lungs: clear to auscultation bilaterally  Heart: regular rate and rhythm, S1, S2 normal, no murmur, click, rub or gallop  Abdomen: soft, non-tender. Bowel sounds normal. No masses,  no organomegaly  Extremities: extremities normal, atraumatic, no cyanosis or edema  Neurologic: Grossly normal    MEDICAL DECISION MAKING/TESTING  I have reviewed the testing personally and my interpretation is below. Less than 50% bilateral internal carotid artery stenosis    Assessment:     Patient Active Problem List   Diagnosis    Hyponatremia    Hypokalemia    Internal carotid artery occlusion, left    Leukopenia    Hepatitis C    HTN (hypertension)       Plan:  66-year-old female with stable asymptomatic carotid atherosclerosis. No evidence of recurrent carotid disease on the operative left side. Less than 50% on the right. Plan for continued routine surveillance in 1 year    Thank you for allowing me to participate in the care of this individual.  Please do not hesitate to contact me with any questions. Eleni Lockwood M.D., FACS.  2/22/2022  11:38 AM

## 2022-02-22 NOTE — LETTER
[facility]) - Vascular and Endovascular Surgeons  [facility] [address] [place] [id number]  Phone: [phone]  Fax: [phone]           Yaneth Rausch MD      February 22, 2022     Patient: Aura Odonnell   MR Number: [id number]   YOB: 1960   Date of Visit: 2/22/2022       Dear Dr. Macias Diss: Thank you for referring Amber Cates to me for evaluation/treatment. Below are the relevant portions of my assessment and plan of care. If you have questions, please do not hesitate to call me. I look forward to following Rosalino Camilo along with you.     Sincerely,        Yaneth Rausch MD     providers:  ROOSEVELT Oleary NP  [address] [place] [id number]  Via Fax: [phone]

## 2022-07-27 ENCOUNTER — HOSPITAL ENCOUNTER (EMERGENCY)
Age: 62
Discharge: HOME | End: 2022-07-27
Attending: EMERGENCY MEDICINE
Payer: COMMERCIAL

## 2022-07-27 VITALS
SYSTOLIC BLOOD PRESSURE: 132 MMHG | TEMPERATURE: 97.8 F | OXYGEN SATURATION: 95 % | DIASTOLIC BLOOD PRESSURE: 95 MMHG | HEART RATE: 70 BPM | RESPIRATION RATE: 16 BRPM

## 2022-07-27 DIAGNOSIS — R35.0 URINARY FREQUENCY: Primary | ICD-10-CM

## 2022-07-27 PROCEDURE — 99282 EMERGENCY DEPT VISIT SF MDM: CPT

## 2022-07-27 ASSESSMENT — PAIN - FUNCTIONAL ASSESSMENT: PAIN_FUNCTIONAL_ASSESSMENT: NONE - DENIES PAIN

## 2022-07-27 NOTE — TELEPHONE ENCOUNTER
LMOR to please call to schedule carotid US and an office visit with Dr Hailee Villela sometime after 2/10/22. Abdiaziz (Plastics / Hand)

## 2022-07-28 NOTE — DISCHARGE INSTRUCTIONS
Uvulitis care for you today. You were seen in the emergency department for your urinary frequency and dysuria. You were prescribed antibiotics by your primary care doctor. Please continue this course of medications and follow-up as prescribed. As we discussed, you may continue to have dysuria and urgency urinary frequency. Your symptoms may not be caused by a urinary tract infection. If your symptoms continue. Please follow-up with your doctor who may refer you to urogyn clinic to be seen for urinary incontinence.

## 2022-07-28 NOTE — ED TRIAGE NOTES
Patient presents to ED for excessive urination after diagnosis of a UTI. Patient states since coming to the ED, her urination has slowed down.

## 2022-07-28 NOTE — ED PROVIDER NOTES
[id number] [facility] RESIDENT NOTE       Date of evaluation: 7/27/2022    Chief Complaint     Urinary Tract Infection      History of Present Illness     Rj Martinez is a 59 y.o. female who presents     with dysuria and urinary frequency patient was seen by her primary care provider for this today. She had been started on antibiotics and is currently taking course of Macrobid with 2 days left. She states her symptoms have improved and she is been waiting in the emergency department. She had previously urinating every 2 hours and felt like she could not leave the toilet. She is wearing depends. She was having burning pain at the end of urination, but this is improved. She denies any fevers, chills, nausea vomiting or diarrhea. No abdominal pain she is postmenopausal and has no abnormal vaginal bleeding or discharge. No saddle anesthesia or fecal incontinence. Review of Systems     Review of Systems  Pertinent positives and negatives reviewed in the HPI. A complete review of was otherwise obtained is negative except as noted above. Past Medical, Surgical, Family, and Social History     She has a past medical history of Anxiety, Arthritis, Hepatitis C, Hyperlipidemia, Hypertension, and Reflux esophagitis. She has a past surgical history that includes joint replacement; fracture surgery; and Carotid endarterectomy (Left, 7/20/2020). Her family history is not on file. She reports that she quit smoking about 2 years ago. Her smoking use included cigarettes. She smoked an average of .5 packs per day. She has never used smokeless tobacco. She reports current alcohol use. She reports that she does not use drugs. Medications     Previous Medications    ACETAMINOPHEN (TYLENOL) 500 MG TABLET    Take 1,000 mg by mouth every 6 hours as needed for Pain    ALPRAZOLAM (XANAX) 0.5 MG TABLET    Take 0.5 mg by mouth 3 times daily as needed for Sleep.      AMLODIPINE (NORVASC) 10 MG TABLET    Take 10 mg by mouth daily    ASPIRIN 81 MG CHEWABLE TABLET    Take 81 mg by mouth daily    CALCIUM CITRATE-VITAMIN D (CALCIUM CITRATE + D) 250-200 MG-UNIT TABS    Take by mouth    CLOPIDOGREL (PLAVIX) 75 MG TABLET    Take 1 tablet by mouth daily    ESCITALOPRAM (LEXAPRO) 10 MG TABLET    Take 10 mg by mouth daily    FLUTICASONE (FLONASE) 50 MCG/ACT NASAL SPRAY    1 spray by Each Nostril route daily    LISINOPRIL (PRINIVIL;ZESTRIL) 20 MG TABLET    Take 20 mg by mouth daily 12.5mg    LORATADINE (CLARITIN) 10 MG CAPSULE    Take 10 mg by mouth daily    METOPROLOL SUCCINATE (TOPROL XL) 50 MG EXTENDED RELEASE TABLET    Take 100 mg by mouth 2 times daily Only take evening dose if BP is high    OFLOXACIN (FLOXIN) 0.3 % OTIC SOLUTION    Place 5 drops in ear(s) daily    PANTOPRAZOLE SODIUM (PROTONIX) 40 MG PACK PACKET    Take 40 mg by mouth 2 times daily (before meals)     SIMVASTATIN (ZOCOR) 20 MG TABLET    Take 20 mg by mouth nightly    TIZANIDINE (ZANAFLEX) 4 MG CAPSULE    Take 4 mg by mouth 3 times daily    TRAZODONE (DESYREL) 100 MG TABLET    Take 150 mg by mouth nightly        Allergies     She is allergic to penicillins, hydrochlorothiazide, nut  [macadamia nut oil], peanut (diagnostic), and peanut-containing drug products. Physical Exam     INITIAL VITALS: BP: (!) 132/95, Temp: 97.8 °F (36.6 °C), Heart Rate: 70, Resp: 16, SpO2: 95 %   Physical Exam    Constitutional:  59 y.o. female, well nourished; well developed; in no apparent distress  HEENT:  Normocephalic, atraumatic. Mucous membranes are moist.  Eyes: Anicteric, PERRL, EOMI   Neck:  Supple, Full ROM, trachea midline   Pulmonary:   Lungs clear to auscultation bilaterally with symmetric aeration. No Wheezes/Rales/Rhonchi. Cardiac:  Regular rate and rhythm. No murmurs/rubs/gallops. Abdomen:  Soft, non-tender, non-distended. No suprapubic tenderness no CVA tenderness  Extremities: 2+ radial pulses.  No extremity deformity, swelling or tenderness appreciated. Skin:  No rashes or bruising. Neuro:  Alert and oriented x3. Speech normal. Moves UE and LE spontaneously and symmetrically. Psych:  Mood and affect appropriate for situation. DiagnosticResults     RADIOLOGY:  No orders to display       LABS:   No results found for this visit on 07/27/22. ED BEDSIDE ULTRASOUND:  None    RECENT VITALS:  BP: (!) 132/95, Temp: 97.8 °F (36.6 °C), Heart Rate: 70,Resp: 16, SpO2: 95 %     Procedures     None    ED Course     Nursing Notes, Past Medical Hx, Past Surgical Hx, Social Hx, Allergies, and Family Hx were reviewed. The patient was given the followingmedications:  No orders of the defined types were placed in this encounter. CONSULTS:  None    MEDICAL DECISION MAKING / ASSESSMENT / PLAN     Leobardo Smith is a 59 y.o. female who presents with now resolved dysuria and urinary frequency. I reviewed her primary care provider's note and the urinalysis, which was not overly concerning for urinary tract infection. She is completing a course of Macrobid and her symptoms are resolving. I strongly encouraged follow-up with her primary care physician which she does have scheduled. I did discuss with her other causes of dysuria and urinary frequency, namely incontinence and the need to follow-up with additional specialists if her symptoms do not resolve. Patient was well-appearing, afebrile and without abdominal tenderness. This patient was also evaluated by the attending physician. All care plans were discussed and agreed upon. Risks, benefits, and alternatives were discussed. At this time the patient has been deemed safe for discharge. My customary discharge instructions including strict return precautions for worsening or new symptoms have been communicated. Clinical Impression     1. Urinary frequency        Disposition     PATIENT REFERRED TO:  No follow-up provider specified.     DISCHARGE MEDICATIONS:  New Prescriptions No medications on file       DISPOSITION Decision To Discharge 07/27/2022 09:28:20 PM       Rosie Mohan MD  Resident  07/27/22 [id number]

## 2022-07-28 NOTE — ED NOTES
Pt discharged from ED in stable condition. Discharge instruction explain, all question answered. Pt walked to lobby independently.        Jaimee Nieves, RN  07/27/22 [id number]

## 2022-08-10 NOTE — ED PROVIDER NOTES
ED Attending Attestation Note     Date of evaluation: 7/27/2022    This patient was seen by the resident. I have seen and examined the patient, agree with the workup, evaluation, management and diagnosis. The care plan has been discussed. I was present for any procedures performed in the resident's  note and have made edits to the note where appropriate. My assessment reveals 59 y.o. female presenting for urinary frequency. Was seen earlier today by her PCP for the same after being initiated on antibiotics earlier this week. She is nontoxic-appearing, abdomen soft and benign, no tenderness.        Daniel Maldonado MD  08/10/22 [id number]

## 2022-09-24 ENCOUNTER — HOSPITAL ENCOUNTER (INPATIENT)
Age: 62
LOS: 3 days | Discharge: HOME | DRG: 720 | End: 2022-09-27
Attending: STUDENT IN AN ORGANIZED HEALTH CARE EDUCATION/TRAINING PROGRAM | Admitting: INTERNAL MEDICINE
Payer: MEDICAID

## 2022-09-24 ENCOUNTER — APPOINTMENT (OUTPATIENT)
Dept: GENERAL RADIOLOGY | Age: 62
DRG: 720 | End: 2022-09-24
Payer: MEDICAID

## 2022-09-24 ENCOUNTER — APPOINTMENT (OUTPATIENT)
Dept: CT IMAGING | Age: 62
DRG: 720 | End: 2022-09-24
Payer: MEDICAID

## 2022-09-24 DIAGNOSIS — A41.9 SEPSIS ASSOCIATED HYPOTENSION (HCC): ICD-10-CM

## 2022-09-24 DIAGNOSIS — N17.9 ACUTE KIDNEY INJURY (HCC): ICD-10-CM

## 2022-09-24 DIAGNOSIS — I95.9 SEPSIS ASSOCIATED HYPOTENSION (HCC): ICD-10-CM

## 2022-09-24 DIAGNOSIS — N13.30 HYDRONEPHROSIS, BILATERAL: ICD-10-CM

## 2022-09-24 DIAGNOSIS — N10 ACUTE PYELONEPHRITIS: ICD-10-CM

## 2022-09-24 DIAGNOSIS — A41.9 SEPSIS WITHOUT ACUTE ORGAN DYSFUNCTION, DUE TO UNSPECIFIED ORGANISM (HCC): Primary | ICD-10-CM

## 2022-09-24 LAB
A/G RATIO: 1.3 (ref 1.1–2.2)
ALBUMIN SERPL-MCNC: 3.8 G/DL (ref 3.4–5)
ALP BLD-CCNC: 77 U/L (ref 40–129)
ALT SERPL-CCNC: 13 U/L (ref 10–40)
AMORPHOUS: ABNORMAL /HPF
ANION GAP SERPL CALCULATED.3IONS-SCNC: 15 MMOL/L (ref 3–16)
AST SERPL-CCNC: 26 U/L (ref 15–37)
BACTERIA: ABNORMAL /HPF
BASOPHILS ABSOLUTE: 0 K/UL (ref 0–0.2)
BASOPHILS RELATIVE PERCENT: 0 %
BILIRUB SERPL-MCNC: 0.4 MG/DL (ref 0–1)
BILIRUBIN URINE: NEGATIVE
BLOOD, URINE: ABNORMAL
BUN BLDV-MCNC: 22 MG/DL (ref 7–20)
CALCIUM SERPL-MCNC: 8.9 MG/DL (ref 8.3–10.6)
CHLORIDE BLD-SCNC: 97 MMOL/L (ref 99–110)
CLARITY: CLEAR
CO2: 20 MMOL/L (ref 21–32)
COLOR: YELLOW
CREAT SERPL-MCNC: 2 MG/DL (ref 0.6–1.2)
EOSINOPHILS ABSOLUTE: 0 K/UL (ref 0–0.6)
EOSINOPHILS RELATIVE PERCENT: 0 %
EPITHELIAL CELLS, UA: ABNORMAL /HPF (ref 0–5)
GFR AFRICAN AMERICAN: 30
GFR NON-AFRICAN AMERICAN: 25
GLUCOSE BLD-MCNC: 109 MG/DL (ref 70–99)
GLUCOSE URINE: NEGATIVE MG/DL
HCT VFR BLD CALC: 39.3 % (ref 36–48)
HEMOGLOBIN: 13.5 G/DL (ref 12–16)
INFLUENZA A: NOT DETECTED
INFLUENZA B: NOT DETECTED
KETONES, URINE: NEGATIVE MG/DL
LACTIC ACID, SEPSIS: 1.6 MMOL/L (ref 0.4–1.9)
LACTIC ACID, SEPSIS: 5 MMOL/L (ref 0.4–1.9)
LEUKOCYTE ESTERASE, URINE: ABNORMAL
LYMPHOCYTES ABSOLUTE: 0.4 K/UL (ref 1–5.1)
LYMPHOCYTES RELATIVE PERCENT: 2 %
MCH RBC QN AUTO: 30.3 PG (ref 26–34)
MCHC RBC AUTO-ENTMCNC: 34.4 G/DL (ref 31–36)
MCV RBC AUTO: 88.2 FL (ref 80–100)
MICROSCOPIC EXAMINATION: YES
MONOCYTES ABSOLUTE: 0.8 K/UL (ref 0–1.3)
MONOCYTES RELATIVE PERCENT: 4 %
NEUTROPHILS ABSOLUTE: 19.2 K/UL (ref 1.7–7.7)
NEUTROPHILS RELATIVE PERCENT: 94 %
NITRITE, URINE: POSITIVE
PDW BLD-RTO: 15.1 % (ref 12.4–15.4)
PH UA: 6.5 (ref 5–8)
PLATELET # BLD: 150 K/UL (ref 135–450)
PMV BLD AUTO: 7.8 FL (ref 5–10.5)
POTASSIUM REFLEX MAGNESIUM: 3.7 MMOL/L (ref 3.5–5.1)
PROTEIN UA: 100 MG/DL
RBC # BLD: 4.45 M/UL (ref 4–5.2)
RBC UA: ABNORMAL /HPF (ref 0–4)
SARS-COV-2 RNA, RT PCR: NOT DETECTED
SODIUM BLD-SCNC: 132 MMOL/L (ref 136–145)
SPECIFIC GRAVITY UA: 1.01 (ref 1–1.03)
TOTAL PROTEIN: 6.8 G/DL (ref 6.4–8.2)
URINE REFLEX TO CULTURE: YES
URINE TYPE: ABNORMAL
UROBILINOGEN, URINE: 0.2 E.U./DL
WBC # BLD: 20.4 K/UL (ref 4–11)
WBC UA: ABNORMAL /HPF (ref 0–5)

## 2022-09-24 PROCEDURE — 36415 COLL VENOUS BLD VENIPUNCTURE: CPT

## 2022-09-24 PROCEDURE — 87186 SC STD MICRODIL/AGAR DIL: CPT

## 2022-09-24 PROCEDURE — 93005 ELECTROCARDIOGRAM TRACING: CPT | Performed by: INTERNAL MEDICINE

## 2022-09-24 PROCEDURE — 96365 THER/PROPH/DIAG IV INF INIT: CPT

## 2022-09-24 PROCEDURE — 93005 ELECTROCARDIOGRAM TRACING: CPT

## 2022-09-24 PROCEDURE — 87150 DNA/RNA AMPLIFIED PROBE: CPT

## 2022-09-24 PROCEDURE — 83605 ASSAY OF LACTIC ACID: CPT

## 2022-09-24 PROCEDURE — 2580000003 HC RX 258

## 2022-09-24 PROCEDURE — 87636 SARSCOV2 & INF A&B AMP PRB: CPT

## 2022-09-24 PROCEDURE — 87040 BLOOD CULTURE FOR BACTERIA: CPT

## 2022-09-24 PROCEDURE — 2580000003 HC RX 258: Performed by: INTERNAL MEDICINE

## 2022-09-24 PROCEDURE — 1200000000 HC SEMI PRIVATE

## 2022-09-24 PROCEDURE — 71045 X-RAY EXAM CHEST 1 VIEW: CPT

## 2022-09-24 PROCEDURE — 80053 COMPREHEN METABOLIC PANEL: CPT

## 2022-09-24 PROCEDURE — 6370000000 HC RX 637 (ALT 250 FOR IP)

## 2022-09-24 PROCEDURE — 6360000002 HC RX W HCPCS

## 2022-09-24 PROCEDURE — 85025 COMPLETE CBC W/AUTO DIFF WBC: CPT

## 2022-09-24 PROCEDURE — 6360000004 HC RX CONTRAST MEDICATION

## 2022-09-24 PROCEDURE — 96361 HYDRATE IV INFUSION ADD-ON: CPT

## 2022-09-24 PROCEDURE — 71260 CT THORAX DX C+: CPT

## 2022-09-24 PROCEDURE — 99285 EMERGENCY DEPT VISIT HI MDM: CPT

## 2022-09-24 PROCEDURE — 2500000003 HC RX 250 WO HCPCS

## 2022-09-24 PROCEDURE — 81001 URINALYSIS AUTO W/SCOPE: CPT

## 2022-09-24 RX ORDER — CLOPIDOGREL BISULFATE 75 MG/1
75 TABLET ORAL DAILY
Status: DISCONTINUED | OUTPATIENT
Start: 2022-09-25 | End: 2022-09-25 | Stop reason: ALTCHOICE

## 2022-09-24 RX ORDER — MONTELUKAST SODIUM 4 MG/1
3 TABLET, CHEWABLE ORAL 2 TIMES DAILY
COMMUNITY
Start: 2022-02-14

## 2022-09-24 RX ORDER — POLYETHYLENE GLYCOL 3350 17 G/17G
17 POWDER, FOR SOLUTION ORAL DAILY PRN
Status: DISCONTINUED | OUTPATIENT
Start: 2022-09-24 | End: 2022-09-27 | Stop reason: HOSPADM

## 2022-09-24 RX ORDER — BENZONATATE 100 MG/1
100 CAPSULE ORAL 3 TIMES DAILY PRN
Status: DISCONTINUED | OUTPATIENT
Start: 2022-09-24 | End: 2022-09-27 | Stop reason: HOSPADM

## 2022-09-24 RX ORDER — ALBUTEROL SULFATE 2.5 MG/3ML
2.5 SOLUTION RESPIRATORY (INHALATION) ONCE
Status: DISCONTINUED | OUTPATIENT
Start: 2022-09-24 | End: 2022-09-25

## 2022-09-24 RX ORDER — ATOMOXETINE 60 MG/1
60 CAPSULE ORAL DAILY
COMMUNITY
Start: 2022-09-13

## 2022-09-24 RX ORDER — MESALAMINE 800 MG/1
TABLET, DELAYED RELEASE ORAL
COMMUNITY
Start: 2022-09-22

## 2022-09-24 RX ORDER — LISINOPRIL 20 MG/1
20 TABLET ORAL DAILY
Status: DISCONTINUED | OUTPATIENT
Start: 2022-09-25 | End: 2022-09-26

## 2022-09-24 RX ORDER — SODIUM CHLORIDE 9 MG/ML
INJECTION, SOLUTION INTRAVENOUS PRN
Status: DISCONTINUED | OUTPATIENT
Start: 2022-09-24 | End: 2022-09-27 | Stop reason: HOSPADM

## 2022-09-24 RX ORDER — ACETAMINOPHEN 500 MG
TABLET ORAL
Status: COMPLETED
Start: 2022-09-24 | End: 2022-09-24

## 2022-09-24 RX ORDER — METOPROLOL SUCCINATE 100 MG/1
100 TABLET, EXTENDED RELEASE ORAL DAILY
Status: DISCONTINUED | OUTPATIENT
Start: 2022-09-25 | End: 2022-09-26

## 2022-09-24 RX ORDER — ATORVASTATIN CALCIUM 20 MG/1
20 TABLET, FILM COATED ORAL NIGHTLY
Status: DISCONTINUED | OUTPATIENT
Start: 2022-09-24 | End: 2022-09-27 | Stop reason: HOSPADM

## 2022-09-24 RX ORDER — ASPIRIN 81 MG/1
81 TABLET, CHEWABLE ORAL DAILY
Status: DISCONTINUED | OUTPATIENT
Start: 2022-09-25 | End: 2022-09-27 | Stop reason: HOSPADM

## 2022-09-24 RX ORDER — ALBUTEROL SULFATE 2.5 MG/3ML
2.5 SOLUTION RESPIRATORY (INHALATION) 4 TIMES DAILY PRN
Status: DISCONTINUED | OUTPATIENT
Start: 2022-09-25 | End: 2022-09-27 | Stop reason: HOSPADM

## 2022-09-24 RX ORDER — SODIUM CHLORIDE 0.9 % (FLUSH) 0.9 %
5-40 SYRINGE (ML) INJECTION EVERY 12 HOURS SCHEDULED
Status: DISCONTINUED | OUTPATIENT
Start: 2022-09-24 | End: 2022-09-27 | Stop reason: HOSPADM

## 2022-09-24 RX ORDER — AMLODIPINE BESYLATE 10 MG/1
10 TABLET ORAL DAILY
Status: DISCONTINUED | OUTPATIENT
Start: 2022-09-25 | End: 2022-09-26

## 2022-09-24 RX ORDER — SODIUM CHLORIDE, SODIUM LACTATE, POTASSIUM CHLORIDE, AND CALCIUM CHLORIDE .6; .31; .03; .02 G/100ML; G/100ML; G/100ML; G/100ML
1000 INJECTION, SOLUTION INTRAVENOUS
Status: COMPLETED | OUTPATIENT
Start: 2022-09-24 | End: 2022-09-24

## 2022-09-24 RX ORDER — ACETAMINOPHEN 325 MG/1
650 TABLET ORAL EVERY 6 HOURS PRN
Status: DISCONTINUED | OUTPATIENT
Start: 2022-09-24 | End: 2022-09-27 | Stop reason: HOSPADM

## 2022-09-24 RX ORDER — ENOXAPARIN SODIUM 100 MG/ML
30 INJECTION SUBCUTANEOUS DAILY
Status: DISCONTINUED | OUTPATIENT
Start: 2022-09-25 | End: 2022-09-27 | Stop reason: HOSPADM

## 2022-09-24 RX ORDER — ONDANSETRON 2 MG/ML
4 INJECTION INTRAMUSCULAR; INTRAVENOUS EVERY 6 HOURS PRN
Status: DISCONTINUED | OUTPATIENT
Start: 2022-09-24 | End: 2022-09-27 | Stop reason: HOSPADM

## 2022-09-24 RX ORDER — SENNA AND DOCUSATE SODIUM 50; 8.6 MG/1; MG/1
2 TABLET, FILM COATED ORAL 2 TIMES DAILY
Status: DISCONTINUED | OUTPATIENT
Start: 2022-09-25 | End: 2022-09-25

## 2022-09-24 RX ORDER — ESCITALOPRAM OXALATE 10 MG/1
10 TABLET ORAL DAILY
Status: DISCONTINUED | OUTPATIENT
Start: 2022-09-25 | End: 2022-09-27 | Stop reason: HOSPADM

## 2022-09-24 RX ORDER — SODIUM CHLORIDE 9 MG/ML
INJECTION, SOLUTION INTRAVENOUS CONTINUOUS
Status: DISCONTINUED | OUTPATIENT
Start: 2022-09-24 | End: 2022-09-25

## 2022-09-24 RX ORDER — ACETAMINOPHEN 500 MG
1000 TABLET ORAL EVERY 6 HOURS PRN
Status: DISCONTINUED | OUTPATIENT
Start: 2022-09-24 | End: 2022-09-27 | Stop reason: HOSPADM

## 2022-09-24 RX ORDER — SODIUM CHLORIDE, SODIUM LACTATE, POTASSIUM CHLORIDE, AND CALCIUM CHLORIDE .6; .31; .03; .02 G/100ML; G/100ML; G/100ML; G/100ML
1000 INJECTION, SOLUTION INTRAVENOUS ONCE
Status: COMPLETED | OUTPATIENT
Start: 2022-09-24 | End: 2022-09-24

## 2022-09-24 RX ORDER — METOPROLOL TARTRATE 5 MG/5ML
5 INJECTION INTRAVENOUS ONCE
Status: COMPLETED | OUTPATIENT
Start: 2022-09-24 | End: 2022-09-24

## 2022-09-24 RX ORDER — TIZANIDINE 4 MG/1
4 TABLET ORAL 3 TIMES DAILY
Status: DISCONTINUED | OUTPATIENT
Start: 2022-09-24 | End: 2022-09-27 | Stop reason: HOSPADM

## 2022-09-24 RX ORDER — ACETAMINOPHEN 500 MG
1000 TABLET ORAL
Status: COMPLETED | OUTPATIENT
Start: 2022-09-24 | End: 2022-09-24

## 2022-09-24 RX ORDER — NICOTINE 21 MG/24HR
1 PATCH, TRANSDERMAL 24 HOURS TRANSDERMAL DAILY
Status: DISCONTINUED | OUTPATIENT
Start: 2022-09-25 | End: 2022-09-27 | Stop reason: HOSPADM

## 2022-09-24 RX ORDER — ONDANSETRON 4 MG/1
4 TABLET, ORALLY DISINTEGRATING ORAL EVERY 8 HOURS PRN
Status: DISCONTINUED | OUTPATIENT
Start: 2022-09-24 | End: 2022-09-27 | Stop reason: HOSPADM

## 2022-09-24 RX ORDER — ALPRAZOLAM 0.5 MG/1
0.5 TABLET ORAL 3 TIMES DAILY PRN
Status: DISCONTINUED | OUTPATIENT
Start: 2022-09-24 | End: 2022-09-24 | Stop reason: SDUPTHER

## 2022-09-24 RX ORDER — ACETAMINOPHEN 650 MG/1
650 SUPPOSITORY RECTAL EVERY 6 HOURS PRN
Status: DISCONTINUED | OUTPATIENT
Start: 2022-09-24 | End: 2022-09-27 | Stop reason: HOSPADM

## 2022-09-24 RX ORDER — METOPROLOL TARTRATE 5 MG/5ML
INJECTION INTRAVENOUS
Status: COMPLETED
Start: 2022-09-24 | End: 2022-09-24

## 2022-09-24 RX ORDER — ALPRAZOLAM 0.5 MG/1
0.5 TABLET ORAL 3 TIMES DAILY PRN
Status: DISCONTINUED | OUTPATIENT
Start: 2022-09-24 | End: 2022-09-27 | Stop reason: HOSPADM

## 2022-09-24 RX ORDER — SODIUM CHLORIDE 0.9 % (FLUSH) 0.9 %
5-40 SYRINGE (ML) INJECTION PRN
Status: DISCONTINUED | OUTPATIENT
Start: 2022-09-24 | End: 2022-09-27 | Stop reason: HOSPADM

## 2022-09-24 RX ORDER — PANTOPRAZOLE SODIUM 40 MG/1
40 TABLET, DELAYED RELEASE ORAL
Status: DISCONTINUED | OUTPATIENT
Start: 2022-09-25 | End: 2022-09-27 | Stop reason: HOSPADM

## 2022-09-24 RX ADMIN — METOPROLOL TARTRATE 5 MG: 5 INJECTION INTRAVENOUS at 22:20

## 2022-09-24 RX ADMIN — IBUPROFEN 600 MG: 200 TABLET, FILM COATED ORAL at 15:09

## 2022-09-24 RX ADMIN — VANCOMYCIN HYDROCHLORIDE 1500 MG: 10 INJECTION, POWDER, LYOPHILIZED, FOR SOLUTION INTRAVENOUS at 21:12

## 2022-09-24 RX ADMIN — TRAZODONE HYDROCHLORIDE 150 MG: 100 TABLET ORAL at 23:16

## 2022-09-24 RX ADMIN — BENZONATATE 100 MG: 100 CAPSULE ORAL at 23:16

## 2022-09-24 RX ADMIN — ATORVASTATIN CALCIUM 20 MG: 20 TABLET, FILM COATED ORAL at 23:17

## 2022-09-24 RX ADMIN — SODIUM CHLORIDE, SODIUM LACTATE, POTASSIUM CHLORIDE, AND CALCIUM CHLORIDE 1000 ML: .6; .31; .03; .02 INJECTION, SOLUTION INTRAVENOUS at 16:29

## 2022-09-24 RX ADMIN — ACETAMINOPHEN 1000 MG: 500 TABLET ORAL at 22:11

## 2022-09-24 RX ADMIN — SODIUM CHLORIDE: 9 INJECTION, SOLUTION INTRAVENOUS at 22:41

## 2022-09-24 RX ADMIN — SODIUM CHLORIDE, SODIUM LACTATE, POTASSIUM CHLORIDE, AND CALCIUM CHLORIDE 1000 ML: .6; .31; .03; .02 INJECTION, SOLUTION INTRAVENOUS at 15:25

## 2022-09-24 RX ADMIN — SODIUM CHLORIDE, PRESERVATIVE FREE 10 ML: 5 INJECTION INTRAVENOUS at 23:17

## 2022-09-24 RX ADMIN — IOPAMIDOL 75 ML: 755 INJECTION, SOLUTION INTRAVENOUS at 17:45

## 2022-09-24 RX ADMIN — CEFEPIME 2000 MG: 2 INJECTION, POWDER, FOR SOLUTION INTRAMUSCULAR; INTRAVENOUS at 18:59

## 2022-09-24 RX ADMIN — ALPRAZOLAM 0.5 MG: 0.5 TABLET ORAL at 23:16

## 2022-09-24 RX ADMIN — TIZANIDINE 4 MG: 4 TABLET ORAL at 23:17

## 2022-09-24 RX ADMIN — SODIUM CHLORIDE, SODIUM LACTATE, POTASSIUM CHLORIDE, AND CALCIUM CHLORIDE 1000 ML: .6; .31; .03; .02 INJECTION, SOLUTION INTRAVENOUS at 19:14

## 2022-09-24 RX ADMIN — ACETAMINOPHEN 1000 MG: 500 TABLET ORAL at 15:09

## 2022-09-24 ASSESSMENT — ENCOUNTER SYMPTOMS
ABDOMINAL PAIN: 1
NAUSEA: 0
CONSTIPATION: 1
VOMITING: 0
DIARRHEA: 0
ABDOMINAL DISTENTION: 1

## 2022-09-24 ASSESSMENT — PAIN - FUNCTIONAL ASSESSMENT: PAIN_FUNCTIONAL_ASSESSMENT: NONE - DENIES PAIN

## 2022-09-24 NOTE — ED NOTES
Brief Progress Note    The patient Yosvany Schuler is acutely critically ill and requires cross-sectional imaging with intravenous contrast. The potential risk of clinically significant kidney injury as a consequence of contrast, rather than their underlying etiology, is outweighed by the very real potential benefit of discovering an actionable abnormality. Patient has current CALI with Cr 2.0 from normal baseline. Likely dehydration/pre-renal in s/o sepsis and poor PO intake. She is receiving IVF resuscitation to address this. Her renal function will continue to be monitored. According to recent literature co-authored by the [employer] and the [employer] (PMID: 39753677), the risk of CALI in patients with baseline eGFR of <30mL/min/1.73m2 is 0-17%, and less than 2% in patients with higher eGFR. My clinical judgment suggests that Yosvany Schuler is at higher risk of harm from delaying her diagnosis and subsequent therapy.      Jenny Chin MD  5:44 PM 9/24/2022       Jenny Chin MD  Resident  09/24/22 [id number]

## 2022-09-24 NOTE — ED PROVIDER NOTES
[address] [facility] ENCOUNTER          EM RESIDENT NOTE       Date of evaluation: 9/24/2022    Chief Complaint     Dysuria    of Present Illness     Julio Queen is a 59 y.o. female with a h/o HTN, TIA s/p carotid endarectomy, IBS-D, PUD, >40 pack year current smoker who presents to the emergency department with cough and dysuria. He is accompanied by her . Onset of cough 1 month ago, began as cough in the morning that would resolve later in the day. Productive of yellow-colored sputum. This morning, her cough was much more significant and it has been persistent since, she additionally felt lightheaded and weak and was unable to go to work. She has decreased appetite with a weight loss of approximately 10 pounds over the past month. She attributes this to grieving the passing of her mother 1 month ago. She additionally endorses dysuria for the past week and she believes she might have a urinary tract infection. Prior to this, is otherwise been feeling well. Has not checked her temperature at home but has not felt feverish. Denies shortness of breath, chest discomfort or pleuritic chest pain, peripheral edema, abdominal pain, nausea vomiting, or other physical complaints. Denies any regular cough prior to 1 month ago. No known sick contacts, has received COVID vaccination and 1 booster, is overdue for second booster. No prior history of COVID-19 infection. She was seen in our ED 7/27/22 for excessive urination in s/o UTI on macrobid (dx by PCP). Review of her culture data shows three recent urine cultures 5/18/2022, 6/10/2022, 7/27/2022 all growing mixed urogenital schuyler. Review of Systems     A complete ROS was performed and is otherwise negative except as described above. Past Medical, Surgical, Family, and Social History     She has a past medical history of Anxiety, Arthritis, Hepatitis C, Hyperlipidemia, Hypertension, and Reflux esophagitis.   She has a past surgical history that includes joint replacement; fracture surgery; and Carotid endarterectomy (Left, 7/20/2020). Her family history is not on file. She reports that she has been smoking cigarettes. She has a 23.00 pack-year smoking history. She has never used smokeless tobacco. She reports current alcohol use. She reports that she does not use drugs. Medications     Previous Medications    ACETAMINOPHEN (TYLENOL) 500 MG TABLET    Take 1,000 mg by mouth every 6 hours as needed for Pain    ALPRAZOLAM (XANAX) 0.5 MG TABLET    Take 0.5 mg by mouth 3 times daily as needed for Sleep. AMLODIPINE (NORVASC) 10 MG TABLET    Take 10 mg by mouth daily    ASPIRIN 81 MG CHEWABLE TABLET    Take 81 mg by mouth daily    CALCIUM CITRATE-VITAMIN D (CALCIUM CITRATE + D) 250-200 MG-UNIT TABS    Take by mouth    CLOPIDOGREL (PLAVIX) 75 MG TABLET    Take 1 tablet by mouth daily    ESCITALOPRAM (LEXAPRO) 10 MG TABLET    Take 10 mg by mouth daily    FLUTICASONE (FLONASE) 50 MCG/ACT NASAL SPRAY    1 spray by Each Nostril route daily    LISINOPRIL (PRINIVIL;ZESTRIL) 20 MG TABLET    Take 20 mg by mouth daily 12.5mg    LORATADINE (CLARITIN) 10 MG CAPSULE    Take 10 mg by mouth daily    METOPROLOL SUCCINATE (TOPROL XL) 50 MG EXTENDED RELEASE TABLET    Take 100 mg by mouth 2 times daily Only take evening dose if BP is high    OFLOXACIN (FLOXIN) 0.3 % OTIC SOLUTION    Place 5 drops in ear(s) daily    PANTOPRAZOLE SODIUM (PROTONIX) 40 MG PACK PACKET    Take 40 mg by mouth 2 times daily (before meals)     SIMVASTATIN (ZOCOR) 20 MG TABLET    Take 20 mg by mouth nightly    TIZANIDINE (ZANAFLEX) 4 MG CAPSULE    Take 4 mg by mouth 3 times daily    TRAZODONE (DESYREL) 100 MG TABLET    Take 150 mg by mouth nightly        Allergies     She is allergic to penicillins, hydrochlorothiazide, nut  [macadamia nut oil], peanut (diagnostic), and peanut-containing drug products.     Physical Exam     INITIAL VITALS: BP: (!) 141/90, Temp: (!) 103.1 °F (39.5 °C), Heart Rate: (!) 125, Resp: 16, SpO2: 94 %     Triage and nursing notes reviewed. General: Appears acutely ill and tired, but nontoxic, ambulates independently  Head: atraumatic, normocephalic  Eyes: PERRL, EOM intact, sclerae normal  ENT: oropharynx moist and clear, no nasal discharge  Neck: supple, ROM intact  Resp: breathing comfortably on room air but has intermittent coughing fits, speaks in full sentences, breath sounds clear bilaterally, no wheezing  Cards: regular rate and rhythm, normal S1 S2 without murmur, rubs, or gallops  Vasc: extremities warm and well-perfused, distal pulses 2+ throughout, cap refill slightly delayed  Abd: soft, non-distended, non-tender without peritoneal signs  Back: There is left-sided thoracic and flank tenderness, involving CVA but extending around the area. No right-sided CVA tenderness. Ext/MSK: extremities atraumatic, no peripheral edema  Skin: Diffusely very warm to touch, dry, color appropriate, no rashes on examined skin  Neuro: alert and oriented, responds to questions appropriately, no focal deficit, coordination and strength grossly intact      DiagnosticResults       RADIOLOGY:  CT CHEST ABDOMEN PELVIS W CONTRAST   Final Result      CHEST:   No acute bony process or consolidation      ABDOMEN AND PELVIS:      Mild right hydronephrosis and mild to moderate left hydronephrosis and hydroureter greater on the left with bladder wall thickening and without definitive obstructing stones. Correlate for cystitis and/or pyelonephritis with some enhancement around the    renal collecting systems suggesting infection, without obstructing stones identified      Nonspecific fluid-filled loops of small bowel may reflect an ileus. Moderate stool noted throughout the colon without obstructing lesion appreciated. Mild diverticulosis in the sigmoid region. XR CHEST PORTABLE   Final Result      1. No acute disease.              LABS:   Results for orders placed or performed during the hospital encounter of 09/24/22   CBC with Auto Differential   Result Value Ref Range    WBC 20.4 (H) 4.0 - 11.0 K/uL    RBC 4.45 4.00 - 5.20 M/uL    Hemoglobin 13.5 12.0 - 16.0 g/dL    Hematocrit 39.3 36.0 - 48.0 %    MCV 88.2 80.0 - 100.0 fL    MCH 30.3 26.0 - 34.0 pg    MCHC 34.4 31.0 - 36.0 g/dL    RDW 15.1 12.4 - 15.4 %    Platelets 725 338 - 585 K/uL    MPV 7.8 5.0 - 10.5 fL    Neutrophils % 94.0 %    Lymphocytes % 2.0 %    Monocytes % 4.0 %    Eosinophils % 0.0 %    Basophils % 0.0 %    Neutrophils Absolute 19.2 (H) 1.7 - 7.7 K/uL    Lymphocytes Absolute 0.4 (L) 1.0 - 5.1 K/uL    Monocytes Absolute 0.8 0.0 - 1.3 K/uL    Eosinophils Absolute 0.0 0.0 - 0.6 K/uL    Basophils Absolute 0.0 0.0 - 0.2 K/uL   CMP w/ Reflex to MG   Result Value Ref Range    Sodium 132 (L) 136 - 145 mmol/L    Potassium reflex Magnesium 3.7 3.5 - 5.1 mmol/L    Chloride 97 (L) 99 - 110 mmol/L    CO2 20 (L) 21 - 32 mmol/L    Anion Gap 15 3 - 16    Glucose 109 (H) 70 - 99 mg/dL    BUN 22 (H) 7 - 20 mg/dL    Creatinine 2.0 (H) 0.6 - 1.2 mg/dL    GFR Non-African American 25 (A) >60    GFR  30 (A) >60    Calcium 8.9 8.3 - 10.6 mg/dL    Total Protein 6.8 6.4 - 8.2 g/dL    Albumin 3.8 3.4 - 5.0 g/dL    Albumin/Globulin Ratio 1.3 1.1 - 2.2    Total Bilirubin 0.4 0.0 - 1.0 mg/dL    Alkaline Phosphatase 77 40 - 129 U/L    ALT 13 10 - 40 U/L    AST 26 15 - 37 U/L   Lactate, Sepsis   Result Value Ref Range    Lactic Acid, Sepsis 1.6 0.4 - 1.9 mmol/L   Urinalysis with Reflex to Culture    Specimen: Urine   Result Value Ref Range    Color, UA Yellow Straw/Yellow    Clarity, UA Clear Clear    Glucose, Ur Negative Negative mg/dL    Bilirubin Urine Negative Negative    Ketones, Urine Negative Negative mg/dL    Specific Gravity, UA 1.010 1.005 - 1.030    Blood, Urine SMALL (A) Negative    pH, UA 6.5 5.0 - 8.0    Protein,  (A) Negative mg/dL    Urobilinogen, Urine 0.2 <2.0 E.U./dL    Nitrite, Urine POSITIVE (A) Negative    Leukocyte Esterase, Urine LARGE (A) Negative    Microscopic Examination YES     Urine Type Other     Urine Reflex to Culture Yes    Microscopic Urinalysis   Result Value Ref Range    WBC, UA  (A) 0 - 5 /HPF    RBC, UA 3-4 0 - 4 /HPF    Epithelial Cells, UA 0-1 0 - 5 /HPF    Bacteria, UA 3+ (A) None Seen /HPF    Amorphous, UA Rare /HPF       ED BEDSIDE ULTRASOUND:  No results found. RECENT VITALS:  BP: 108/69, Temp: 100.3 °F (37.9 °C), Heart Rate: 72,Resp: 18, SpO2: 95 %     Procedures     None    ED Course     Nursing Notes, Past Medical Hx, Past Surgical Hx, Social Hx, Allergies, and Family Hx were reviewed. ED Course as of 09/24/22 1952   Sat Sep 24, 2022   1943 COVID-19 & Influenza Combo [IL]      ED Course User Index  [IL] Ramírez Santillan MD       The patient was given the followingmedications:  Orders Placed This Encounter   Medications    lactated ringers bolus    acetaminophen (TYLENOL) tablet 1,000 mg    ibuprofen (ADVIL;MOTRIN) tablet 600 mg    lactated ringers bolus    iopamidol (ISOVUE-370) 76 % injection 75 mL    cefepime (MAXIPIME) 2000 mg IVPB minibag     Order Specific Question:   Antimicrobial Indications     Answer:   Sepsis of Unknown Etiology    vancomycin (VANCOCIN) 1,500 mg in dextrose 5 % 250 mL IVPB     Order Specific Question:   Antimicrobial Indications     Answer:   Sepsis of Unknown Etiology    lactated ringers bolus       CONSULTS:  IP CONSULT TO HOSPITALIST    MEDICAL DECISION MAKING / ASSESSMENT / Nirmal Mabel is a 59 y.o. female with history as described above presenting with 1 month of progressive productive cough 1 week of dysuria, found to be febrile to 103F and tachycardic in the 120s, initially normotensive at triage. On exam, she is diffusely warm with clear lungs and well-perfused extremities. She has left-sided thoracic and flank tenderness otherwise nontender nonacute abdomen.   Initial labs notable for leukocytosis to 20 and CALI with a creatinine of 2.0 (BUN 21) from a normal baseline, lactate 1.6. Normal hepatic labs. blood cultures obtained. X-ray without acute process. COVID/influenza negative. Given Tylenol and ibuprofen for fever and started IV fluid boluses with lactated Ringer's. CT chest abdomen pelvis performed given sepsis with infection of unclear origin as well as her cough and flank pain. While awaiting results, she was started on cefepime and vancomycin as empiric antimicrobial treatment. CT most notable for bilateral hydronephrosis without obstructing stone stone, size just above cystitis. UA resulted nitrite and leukocyte positive. Limited bedside POCUS study with grossly normal EF. Blood pressure trending down with systolic in the 71P, MAP still above 70 after 2 L LR. Ordered additional liter of LR to run over 1 hour prior to signout. At this time, going off service and will be signing out to Dr. Jamar Stoddard. Awaiting callback from hospitalist team for admission. Oncoming provider to follow-up patient's blood pressure and fluid responsiveness, consider pressors if not fluid responsive / not at MAP goal.    This patient was also evaluated by the attending physician. All care plans were discussed and agreed upon. Clinical Impression     1. Sepsis without acute organ dysfunction, due to unspecified organism ([address]    2. Hydronephrosis, bilateral    3. Acute pyelonephritis    4. Sepsis associated hypotension ([address]    5. Acute kidney injury ([address]        Disposition     PATIENT REFERRED TO:  No follow-up provider specified.     DISCHARGE MEDICATIONS:  New Prescriptions    No medications on file       DISPOSITION Decision To Admit 09/24/2022 07:33:05 PM        Elo Quan MD  Resident  09/24/22 [id number]

## 2022-09-24 NOTE — ED PROVIDER NOTES
ED Attending Attestation Note     Date of evaluation: 9/24/2022    This patient was seen by the resident. I have seen and examined the patient, agree with the workup, evaluation, management and diagnosis. The care plan has been discussed. I have reviewed the ECG and concur with the resident's interpretation. My assessment reveals a 20-year-old female complaining of generalized malaise x1 week, productive cough with wheezing, and dysuria. She has had associated nausea but no vomiting. She does report recent unintentional weight loss with poor oral intake. On exam, she has bibasilar wheezes with normal work of breathing and good aeration, a soft and nontender abdomen, and focal tenderness over her left thoracic back.          Jose Moreno MD  09/24/22 [id number]

## 2022-09-25 ENCOUNTER — ANESTHESIA EVENT (OUTPATIENT)
Dept: OPERATING ROOM | Age: 62
DRG: 720 | End: 2022-09-25
Payer: MEDICAID

## 2022-09-25 ENCOUNTER — APPOINTMENT (OUTPATIENT)
Dept: GENERAL RADIOLOGY | Age: 62
DRG: 720 | End: 2022-09-25
Payer: MEDICAID

## 2022-09-25 ENCOUNTER — ANESTHESIA (OUTPATIENT)
Dept: OPERATING ROOM | Age: 62
DRG: 720 | End: 2022-09-25
Payer: MEDICAID

## 2022-09-25 PROBLEM — A41.9 SEVERE SEPSIS WITH ACUTE ORGAN DYSFUNCTION (HCC): Status: ACTIVE | Noted: 2022-09-25

## 2022-09-25 PROBLEM — N10 ACUTE PYELONEPHRITIS: Status: ACTIVE | Noted: 2022-09-25

## 2022-09-25 PROBLEM — E87.20 LACTIC ACIDOSIS: Status: ACTIVE | Noted: 2022-09-25

## 2022-09-25 PROBLEM — R65.20 SEVERE SEPSIS WITH ACUTE ORGAN DYSFUNCTION (HCC): Status: ACTIVE | Noted: 2022-09-25

## 2022-09-25 PROBLEM — K21.9 GASTROESOPHAGEAL REFLUX DISEASE WITHOUT ESOPHAGITIS: Status: ACTIVE | Noted: 2022-09-25

## 2022-09-25 PROBLEM — E78.5 HYPERLIPIDEMIA: Status: ACTIVE | Noted: 2022-09-25

## 2022-09-25 PROBLEM — R73.9 HYPERGLYCEMIA: Status: ACTIVE | Noted: 2022-09-25

## 2022-09-25 PROBLEM — N17.9 AKI (ACUTE KIDNEY INJURY) (HCC): Status: ACTIVE | Noted: 2022-09-25

## 2022-09-25 LAB
ANION GAP SERPL CALCULATED.3IONS-SCNC: 14 MMOL/L (ref 3–16)
BANDED NEUTROPHILS RELATIVE PERCENT: 11 % (ref 0–7)
BASOPHILS ABSOLUTE: 0 K/UL (ref 0–0.2)
BASOPHILS RELATIVE PERCENT: 0 %
BUN BLDV-MCNC: 27 MG/DL (ref 7–20)
CALCIUM SERPL-MCNC: 7.7 MG/DL (ref 8.3–10.6)
CHLORIDE BLD-SCNC: 105 MMOL/L (ref 99–110)
CO2: 18 MMOL/L (ref 21–32)
CREAT SERPL-MCNC: 2.2 MG/DL (ref 0.6–1.2)
CREATININE URINE: 56.8 MG/DL (ref 28–259)
EKG ATRIAL RATE: 125 BPM
EKG DIAGNOSIS: NORMAL
EKG P AXIS: 46 DEGREES
EKG P-R INTERVAL: 120 MS
EKG Q-T INTERVAL: 308 MS
EKG QRS DURATION: 70 MS
EKG QTC CALCULATION (BAZETT): 444 MS
EKG R AXIS: 36 DEGREES
EKG T AXIS: 44 DEGREES
EKG VENTRICULAR RATE: 125 BPM
EOSINOPHILS ABSOLUTE: 0 K/UL (ref 0–0.6)
EOSINOPHILS RELATIVE PERCENT: 0 %
GFR AFRICAN AMERICAN: 27
GFR NON-AFRICAN AMERICAN: 22
GLUCOSE BLD-MCNC: 78 MG/DL (ref 70–99)
HCT VFR BLD CALC: 34.2 % (ref 36–48)
HEMOGLOBIN: 11.3 G/DL (ref 12–16)
LACTIC ACID: 1.8 MMOL/L (ref 0.4–2)
LACTIC ACID: 2.5 MMOL/L (ref 0.4–2)
LACTIC ACID: 2.6 MMOL/L (ref 0.4–2)
LYMPHOCYTES ABSOLUTE: 0.4 K/UL (ref 1–5.1)
LYMPHOCYTES RELATIVE PERCENT: 2 %
MCH RBC QN AUTO: 29.8 PG (ref 26–34)
MCHC RBC AUTO-ENTMCNC: 33.1 G/DL (ref 31–36)
MCV RBC AUTO: 89.9 FL (ref 80–100)
METAMYELOCYTES RELATIVE PERCENT: 1 %
MONOCYTES ABSOLUTE: 1.7 K/UL (ref 0–1.3)
MONOCYTES RELATIVE PERCENT: 9 %
MYELOCYTE PERCENT: 1 %
NEUTROPHILS ABSOLUTE: 17.2 K/UL (ref 1.7–7.7)
NEUTROPHILS RELATIVE PERCENT: 76 %
PDW BLD-RTO: 15.6 % (ref 12.4–15.4)
PLATELET # BLD: 127 K/UL (ref 135–450)
PMV BLD AUTO: 8.8 FL (ref 5–10.5)
POTASSIUM REFLEX MAGNESIUM: 4.5 MMOL/L (ref 3.5–5.1)
RBC # BLD: 3.8 M/UL (ref 4–5.2)
REPORT: NORMAL
SODIUM BLD-SCNC: 137 MMOL/L (ref 136–145)
SODIUM URINE: <20 MMOL/L
WBC # BLD: 19.3 K/UL (ref 4–11)

## 2022-09-25 PROCEDURE — 2580000003 HC RX 258: Performed by: ANESTHESIOLOGY

## 2022-09-25 PROCEDURE — 83605 ASSAY OF LACTIC ACID: CPT

## 2022-09-25 PROCEDURE — C1758 CATHETER, URETERAL: HCPCS | Performed by: UROLOGY

## 2022-09-25 PROCEDURE — 7100000001 HC PACU RECOVERY - ADDTL 15 MIN: Performed by: UROLOGY

## 2022-09-25 PROCEDURE — 2580000003 HC RX 258: Performed by: UROLOGY

## 2022-09-25 PROCEDURE — 84300 ASSAY OF URINE SODIUM: CPT

## 2022-09-25 PROCEDURE — 3600000014 HC SURGERY LEVEL 4 ADDTL 15MIN: Performed by: UROLOGY

## 2022-09-25 PROCEDURE — 6370000000 HC RX 637 (ALT 250 FOR IP): Performed by: STUDENT IN AN ORGANIZED HEALTH CARE EDUCATION/TRAINING PROGRAM

## 2022-09-25 PROCEDURE — 6360000002 HC RX W HCPCS

## 2022-09-25 PROCEDURE — 82570 ASSAY OF URINE CREATININE: CPT

## 2022-09-25 PROCEDURE — 0T788DZ DILATION OF BILATERAL URETERS WITH INTRALUMINAL DEVICE, VIA NATURAL OR ARTIFICIAL OPENING ENDOSCOPIC: ICD-10-PCS | Performed by: UROLOGY

## 2022-09-25 PROCEDURE — BT14YZZ FLUOROSCOPY OF KIDNEYS, URETERS AND BLADDER USING OTHER CONTRAST: ICD-10-PCS | Performed by: UROLOGY

## 2022-09-25 PROCEDURE — 3600000004 HC SURGERY LEVEL 4 BASE: Performed by: UROLOGY

## 2022-09-25 PROCEDURE — 94640 AIRWAY INHALATION TREATMENT: CPT

## 2022-09-25 PROCEDURE — 2709999900 HC NON-CHARGEABLE SUPPLY: Performed by: UROLOGY

## 2022-09-25 PROCEDURE — 6360000002 HC RX W HCPCS: Performed by: ANESTHESIOLOGY

## 2022-09-25 PROCEDURE — C2617 STENT, NON-COR, TEM W/O DEL: HCPCS | Performed by: UROLOGY

## 2022-09-25 PROCEDURE — 2580000003 HC RX 258: Performed by: INTERNAL MEDICINE

## 2022-09-25 PROCEDURE — 85025 COMPLETE CBC W/AUTO DIFF WBC: CPT

## 2022-09-25 PROCEDURE — 1200000000 HC SEMI PRIVATE

## 2022-09-25 PROCEDURE — 80048 BASIC METABOLIC PNL TOTAL CA: CPT

## 2022-09-25 PROCEDURE — 3700000001 HC ADD 15 MINUTES (ANESTHESIA): Performed by: UROLOGY

## 2022-09-25 PROCEDURE — 3700000000 HC ANESTHESIA ATTENDED CARE: Performed by: UROLOGY

## 2022-09-25 PROCEDURE — 2580000003 HC RX 258: Performed by: STUDENT IN AN ORGANIZED HEALTH CARE EDUCATION/TRAINING PROGRAM

## 2022-09-25 PROCEDURE — 36415 COLL VENOUS BLD VENIPUNCTURE: CPT

## 2022-09-25 PROCEDURE — 6370000000 HC RX 637 (ALT 250 FOR IP): Performed by: INTERNAL MEDICINE

## 2022-09-25 PROCEDURE — 7100000000 HC PACU RECOVERY - FIRST 15 MIN: Performed by: UROLOGY

## 2022-09-25 PROCEDURE — 6360000002 HC RX W HCPCS: Performed by: INTERNAL MEDICINE

## 2022-09-25 PROCEDURE — 6370000000 HC RX 637 (ALT 250 FOR IP)

## 2022-09-25 PROCEDURE — 2500000003 HC RX 250 WO HCPCS: Performed by: ANESTHESIOLOGY

## 2022-09-25 PROCEDURE — 6360000002 HC RX W HCPCS: Performed by: STUDENT IN AN ORGANIZED HEALTH CARE EDUCATION/TRAINING PROGRAM

## 2022-09-25 PROCEDURE — 94761 N-INVAS EAR/PLS OXIMETRY MLT: CPT

## 2022-09-25 PROCEDURE — 94664 DEMO&/EVAL PT USE INHALER: CPT

## 2022-09-25 DEVICE — URETERAL STENT
Type: IMPLANTABLE DEVICE | Site: URETER | Status: FUNCTIONAL
Brand: POLARIS™ ULTRA

## 2022-09-25 RX ORDER — PROPOFOL 10 MG/ML
INJECTION, EMULSION INTRAVENOUS PRN
Status: DISCONTINUED | OUTPATIENT
Start: 2022-09-25 | End: 2022-09-25 | Stop reason: SDUPTHER

## 2022-09-25 RX ORDER — MEPERIDINE HYDROCHLORIDE 25 MG/ML
12.5 INJECTION INTRAMUSCULAR; INTRAVENOUS; SUBCUTANEOUS AS NEEDED
Status: DISCONTINUED | OUTPATIENT
Start: 2022-09-25 | End: 2022-09-25 | Stop reason: HOSPADM

## 2022-09-25 RX ORDER — SODIUM CHLORIDE 0.9 % (FLUSH) 0.9 %
5-40 SYRINGE (ML) INJECTION PRN
Status: DISCONTINUED | OUTPATIENT
Start: 2022-09-25 | End: 2022-09-25 | Stop reason: HOSPADM

## 2022-09-25 RX ORDER — PROCHLORPERAZINE EDISYLATE 5 MG/ML
5 INJECTION INTRAMUSCULAR; INTRAVENOUS
Status: DISCONTINUED | OUTPATIENT
Start: 2022-09-25 | End: 2022-09-25 | Stop reason: HOSPADM

## 2022-09-25 RX ORDER — SODIUM CHLORIDE, SODIUM LACTATE, POTASSIUM CHLORIDE, AND CALCIUM CHLORIDE .6; .31; .03; .02 G/100ML; G/100ML; G/100ML; G/100ML
1000 INJECTION, SOLUTION INTRAVENOUS ONCE
Status: COMPLETED | OUTPATIENT
Start: 2022-09-25 | End: 2022-09-25

## 2022-09-25 RX ORDER — SUCCINYLCHOLINE/SOD CL,ISO/PF 200MG/10ML
SYRINGE (ML) INTRAVENOUS PRN
Status: DISCONTINUED | OUTPATIENT
Start: 2022-09-25 | End: 2022-09-25 | Stop reason: SDUPTHER

## 2022-09-25 RX ORDER — SODIUM CHLORIDE 9 MG/ML
INJECTION, SOLUTION INTRAVENOUS PRN
Status: DISCONTINUED | OUTPATIENT
Start: 2022-09-25 | End: 2022-09-25 | Stop reason: HOSPADM

## 2022-09-25 RX ORDER — SODIUM CHLORIDE 0.9 % (FLUSH) 0.9 %
5-40 SYRINGE (ML) INJECTION EVERY 12 HOURS SCHEDULED
Status: DISCONTINUED | OUTPATIENT
Start: 2022-09-25 | End: 2022-09-25 | Stop reason: HOSPADM

## 2022-09-25 RX ORDER — SODIUM CHLORIDE, SODIUM LACTATE, POTASSIUM CHLORIDE, CALCIUM CHLORIDE 600; 310; 30; 20 MG/100ML; MG/100ML; MG/100ML; MG/100ML
INJECTION, SOLUTION INTRAVENOUS CONTINUOUS PRN
Status: DISCONTINUED | OUTPATIENT
Start: 2022-09-25 | End: 2022-09-25 | Stop reason: SDUPTHER

## 2022-09-25 RX ORDER — HYDRALAZINE HYDROCHLORIDE 20 MG/ML
10 INJECTION INTRAMUSCULAR; INTRAVENOUS
Status: DISCONTINUED | OUTPATIENT
Start: 2022-09-25 | End: 2022-09-25 | Stop reason: HOSPADM

## 2022-09-25 RX ORDER — SENNA AND DOCUSATE SODIUM 50; 8.6 MG/1; MG/1
2 TABLET, FILM COATED ORAL 2 TIMES DAILY
Status: DISCONTINUED | OUTPATIENT
Start: 2022-09-25 | End: 2022-09-27 | Stop reason: HOSPADM

## 2022-09-25 RX ORDER — ONDANSETRON 2 MG/ML
INJECTION INTRAMUSCULAR; INTRAVENOUS PRN
Status: DISCONTINUED | OUTPATIENT
Start: 2022-09-25 | End: 2022-09-25 | Stop reason: SDUPTHER

## 2022-09-25 RX ORDER — ROCURONIUM BROMIDE 10 MG/ML
INJECTION, SOLUTION INTRAVENOUS PRN
Status: DISCONTINUED | OUTPATIENT
Start: 2022-09-25 | End: 2022-09-25 | Stop reason: SDUPTHER

## 2022-09-25 RX ORDER — 0.9 % SODIUM CHLORIDE 0.9 %
1000 INTRAVENOUS SOLUTION INTRAVENOUS ONCE
Status: DISCONTINUED | OUTPATIENT
Start: 2022-09-25 | End: 2022-09-25

## 2022-09-25 RX ORDER — IPRATROPIUM BROMIDE AND ALBUTEROL SULFATE 2.5; .5 MG/3ML; MG/3ML
1 SOLUTION RESPIRATORY (INHALATION) EVERY 4 HOURS PRN
Status: DISCONTINUED | OUTPATIENT
Start: 2022-09-25 | End: 2022-09-27 | Stop reason: HOSPADM

## 2022-09-25 RX ORDER — FENTANYL CITRATE 50 UG/ML
INJECTION, SOLUTION INTRAMUSCULAR; INTRAVENOUS PRN
Status: DISCONTINUED | OUTPATIENT
Start: 2022-09-25 | End: 2022-09-25 | Stop reason: SDUPTHER

## 2022-09-25 RX ORDER — SODIUM CHLORIDE, SODIUM LACTATE, POTASSIUM CHLORIDE, AND CALCIUM CHLORIDE .6; .31; .03; .02 G/100ML; G/100ML; G/100ML; G/100ML
1000 INJECTION, SOLUTION INTRAVENOUS ONCE
Status: DISCONTINUED | OUTPATIENT
Start: 2022-09-25 | End: 2022-09-25

## 2022-09-25 RX ORDER — SODIUM CHLORIDE, SODIUM LACTATE, POTASSIUM CHLORIDE, CALCIUM CHLORIDE 600; 310; 30; 20 MG/100ML; MG/100ML; MG/100ML; MG/100ML
INJECTION, SOLUTION INTRAVENOUS CONTINUOUS
Status: DISCONTINUED | OUTPATIENT
Start: 2022-09-25 | End: 2022-09-27

## 2022-09-25 RX ORDER — MAGNESIUM HYDROXIDE 1200 MG/15ML
LIQUID ORAL PRN
Status: DISCONTINUED | OUTPATIENT
Start: 2022-09-25 | End: 2022-09-25 | Stop reason: HOSPADM

## 2022-09-25 RX ORDER — ONDANSETRON 2 MG/ML
4 INJECTION INTRAMUSCULAR; INTRAVENOUS
Status: DISCONTINUED | OUTPATIENT
Start: 2022-09-25 | End: 2022-09-25 | Stop reason: HOSPADM

## 2022-09-25 RX ORDER — DIPHENHYDRAMINE HYDROCHLORIDE 50 MG/ML
12.5 INJECTION INTRAMUSCULAR; INTRAVENOUS
Status: DISCONTINUED | OUTPATIENT
Start: 2022-09-25 | End: 2022-09-25 | Stop reason: HOSPADM

## 2022-09-25 RX ORDER — METHYLPREDNISOLONE SODIUM SUCCINATE 125 MG/2ML
125 INJECTION, POWDER, LYOPHILIZED, FOR SOLUTION INTRAMUSCULAR; INTRAVENOUS EVERY 12 HOURS
Status: DISCONTINUED | OUTPATIENT
Start: 2022-09-25 | End: 2022-09-25

## 2022-09-25 RX ADMIN — METHYLPREDNISOLONE SODIUM SUCCINATE 125 MG: 125 INJECTION, POWDER, FOR SOLUTION INTRAMUSCULAR; INTRAVENOUS at 06:41

## 2022-09-25 RX ADMIN — SODIUM CHLORIDE, POTASSIUM CHLORIDE, SODIUM LACTATE AND CALCIUM CHLORIDE: 600; 310; 30; 20 INJECTION, SOLUTION INTRAVENOUS at 20:18

## 2022-09-25 RX ADMIN — PANTOPRAZOLE SODIUM 40 MG: 40 TABLET, DELAYED RELEASE ORAL at 06:41

## 2022-09-25 RX ADMIN — FENTANYL CITRATE 50 MCG: 50 INJECTION, SOLUTION INTRAMUSCULAR; INTRAVENOUS at 16:32

## 2022-09-25 RX ADMIN — SODIUM CHLORIDE 1000 ML: 9 INJECTION, SOLUTION INTRAVENOUS at 05:15

## 2022-09-25 RX ADMIN — MOMETASONE FUROATE AND FORMOTEROL FUMARATE DIHYDRATE 2 PUFF: 100; 5 AEROSOL RESPIRATORY (INHALATION) at 09:03

## 2022-09-25 RX ADMIN — ROCURONIUM BROMIDE 10 MG: 10 INJECTION INTRAVENOUS at 16:32

## 2022-09-25 RX ADMIN — MEROPENEM 1000 MG: 1 INJECTION, POWDER, FOR SOLUTION INTRAVENOUS at 20:20

## 2022-09-25 RX ADMIN — ATORVASTATIN CALCIUM 20 MG: 20 TABLET, FILM COATED ORAL at 21:07

## 2022-09-25 RX ADMIN — SODIUM CHLORIDE, SODIUM LACTATE, POTASSIUM CHLORIDE, AND CALCIUM CHLORIDE: .6; .31; .03; .02 INJECTION, SOLUTION INTRAVENOUS at 16:25

## 2022-09-25 RX ADMIN — SODIUM CHLORIDE, POTASSIUM CHLORIDE, SODIUM LACTATE AND CALCIUM CHLORIDE: 600; 310; 30; 20 INJECTION, SOLUTION INTRAVENOUS at 18:03

## 2022-09-25 RX ADMIN — TRAZODONE HYDROCHLORIDE 150 MG: 100 TABLET ORAL at 21:07

## 2022-09-25 RX ADMIN — ESCITALOPRAM OXALATE 10 MG: 10 TABLET ORAL at 08:43

## 2022-09-25 RX ADMIN — MOMETASONE FUROATE AND FORMOTEROL FUMARATE DIHYDRATE 2 PUFF: 100; 5 AEROSOL RESPIRATORY (INHALATION) at 19:42

## 2022-09-25 RX ADMIN — ENOXAPARIN SODIUM 30 MG: 100 INJECTION SUBCUTANEOUS at 08:42

## 2022-09-25 RX ADMIN — MEROPENEM 1000 MG: 1 INJECTION, POWDER, FOR SOLUTION INTRAVENOUS at 08:48

## 2022-09-25 RX ADMIN — FENTANYL CITRATE 50 MCG: 50 INJECTION, SOLUTION INTRAMUSCULAR; INTRAVENOUS at 16:27

## 2022-09-25 RX ADMIN — SODIUM CHLORIDE: 9 INJECTION, SOLUTION INTRAVENOUS at 07:22

## 2022-09-25 RX ADMIN — TIZANIDINE 4 MG: 4 TABLET ORAL at 21:07

## 2022-09-25 RX ADMIN — ASPIRIN 81 MG 81 MG: 81 TABLET ORAL at 08:42

## 2022-09-25 RX ADMIN — PHENYLEPHRINE HYDROCHLORIDE 100 MCG: 10 INJECTION, SOLUTION INTRAMUSCULAR; INTRAVENOUS; SUBCUTANEOUS at 16:52

## 2022-09-25 RX ADMIN — Medication 140 MG: at 16:32

## 2022-09-25 RX ADMIN — ALPRAZOLAM 0.5 MG: 0.5 TABLET ORAL at 21:07

## 2022-09-25 RX ADMIN — ONDANSETRON 4 MG: 2 INJECTION INTRAMUSCULAR; INTRAVENOUS at 16:47

## 2022-09-25 RX ADMIN — SENNOSIDES AND DOCUSATE SODIUM 2 TABLET: 50; 8.6 TABLET ORAL at 21:07

## 2022-09-25 RX ADMIN — MAGNESIUM HYDROXIDE 30 ML: 400 SUSPENSION ORAL at 11:55

## 2022-09-25 RX ADMIN — PROPOFOL 140 MG: 10 INJECTION, EMULSION INTRAVENOUS at 16:32

## 2022-09-25 RX ADMIN — SENNOSIDES AND DOCUSATE SODIUM 2 TABLET: 50; 8.6 TABLET ORAL at 11:58

## 2022-09-25 RX ADMIN — SODIUM CHLORIDE, POTASSIUM CHLORIDE, SODIUM LACTATE AND CALCIUM CHLORIDE 1000 ML: 600; 310; 30; 20 INJECTION, SOLUTION INTRAVENOUS at 14:45

## 2022-09-25 ASSESSMENT — PAIN SCALES - GENERAL
PAINLEVEL_OUTOF10: 0
PAINLEVEL_OUTOF10: 0

## 2022-09-25 ASSESSMENT — COPD QUESTIONNAIRES: CAT_SEVERITY: MODERATE

## 2022-09-25 ASSESSMENT — ENCOUNTER SYMPTOMS
COUGH: 0
SHORTNESS OF BREATH: 1
ABDOMINAL PAIN: 1
WHEEZING: 0
CONSTIPATION: 1
ABDOMINAL DISTENTION: 1
DIARRHEA: 0
BACK PAIN: 0
VOMITING: 0
NAUSEA: 0
SORE THROAT: 0

## 2022-09-25 ASSESSMENT — LIFESTYLE VARIABLES: SMOKING_STATUS: 1

## 2022-09-25 NOTE — PLAN OF CARE
Problem: Infection - Adult  Goal: Absence of infection at discharge  9/25/2022 1954 by Benita Lozano RN  Outcome: Progressing  Flowsheets (Taken 9/25/2022 1954)  Absence of infection at discharge: Assess and monitor for signs and symptoms of infection     Problem: Respiratory - Adult  Goal: Achieves optimal ventilation and oxygenation  Outcome: Progressing  Flowsheets (Taken 9/25/2022 1954)  Achieves optimal ventilation and oxygenation: Assess for changes in respiratory status     Problem: Cardiovascular - Adult  Goal: Absence of cardiac dysrhythmias or at baseline  Outcome: Progressing  Flowsheets (Taken 9/25/2022 1954)  Absence of cardiac dysrhythmias or at baseline: Monitor cardiac rate and rhythm     Problem: Gastrointestinal - Adult  Goal: Maintains or returns to baseline bowel function  Flowsheets (Taken 9/25/2022 1954)  Maintains or returns to baseline bowel function:   Assess bowel function   Administer IV fluids as ordered to ensure adequate hydration     Problem: Gastrointestinal - Adult  Goal: Maintains adequate nutritional intake  Outcome: Progressing  Flowsheets (Taken 9/25/2022 1954)  Maintains adequate nutritional intake: Monitor percentage of each meal consumed     Problem: Metabolic/Fluid and Electrolytes - Adult  Goal: Hemodynamic stability and optimal renal function maintained  Outcome: Progressing  Flowsheets (Taken 9/25/2022 1954)  Hemodynamic stability and optimal renal function maintained: Monitor intake, output and patient weight     Problem: Discharge Planning  Goal: Discharge to home or other facility with appropriate resources  9/25/2022 1954 by Benita Lozano RN  Outcome: Progressing  Flowsheets (Taken 9/25/2022 1954)  Discharge to home or other facility with appropriate resources: Identify discharge learning needs (meds, wound care, etc)

## 2022-09-25 NOTE — PROGRESS NOTES
PACU Transfer Note [id number]    Vitals:    09/25/22 1730   BP: 95/72   Pulse: 92   Resp: 18   Temp: 98.6 °F (37 °C)   SpO2: 96%       In: 595 [P.O.:120;  I.V.:475]  Out: 1550 [Urine:1550]    Pain assessment:  none       Report given to Receiving unit RN.    9/25/2022 5:39 PM

## 2022-09-25 NOTE — H&P
Internal Medicine  PGY 1  History & Physical      CC: Dysuria    History Obtained From:  patient    HISTORY OF PRESENT ILLNESS:  RAGHAV is a 60 y/o female with a PMHx of HTN, anxiety, internal cartoid artery occlusion and cholecystectomy who presents to the ED complaining of dysuria. She said the dysuria started two weeks ago and has been progressively getting worse. Today, she said she had problems dressing herself because she could not think straight, so she came straight to the ED. She reports headache, confusion, abdominal pain and fullness, fever and constipation. Her last bowel movement was several days ago. Although she feels her stomach rumble, nothing passes through. She denies chest pain, lightheadedness and hematuria. She was recently treated for a UTI at the beginning of July and has completed a course of Macrobid. ED course:  Vitals:   BP   /65-90   Temp   103.1   Pulse   125   Resp   16-18   SpO2    94-97   Labs: WBC=20.4; Creatinine of 2.0; Uo=278; U/a + for nitrites, LE, bacteria  Imaging: CT shows mild R hydronephrosis and mild/moderate left hydronephrosis. No identifiable stones. Suggestion of pyelonephritis and cystitis given enhancement around renal collecting systems. CT also shows fluid-filled loops of small bowel. Interventions: Sepsis bolus; Vancomycin and Cefepime    Past Medical History:        Diagnosis Date    Anxiety     Arthritis     Hepatitis C     Hyperlipidemia     Hypertension     Reflux esophagitis        Past Surgical History:        Procedure Laterality Date    CAROTID ENDARTERECTOMY Left 7/20/2020    LEFT CAROTID ENDARTERECTOMY WITH INTRAOPERATIVE DUPLEX SCAN performed by Troy Lynne MD at [address]      left arm    JOINT REPLACEMENT      knee       Medications Priorto Admission:    Not in a hospital admission.     Allergies:  Penicillins, Hydrochlorothiazide, Nut  [macadamia nut oil], Peanut (diagnostic), and Peanut-containing drug products    Social History:   TOBACCO:   reports that she has been smoking cigarettes. She has a 23.00 pack-year smoking history. She has never used smokeless tobacco.  ETOH:   reports current alcohol use. DRUGS : denies    Family History:   History reviewed. No pertinent family history. Review of Systems   Constitutional:  Positive for fever. Negative for chills and fatigue. Cardiovascular:  Negative for chest pain. Gastrointestinal:  Positive for abdominal distention, abdominal pain and constipation. Negative for diarrhea, nausea and vomiting. Genitourinary:  Positive for dysuria. ROS: A 10 point review of systems was conducted, significant findings as noted in HPI. Physical Exam  Cardiovascular:      Rate and Rhythm: Normal rate and regular rhythm. Pulses: Normal pulses. Heart sounds: Normal heart sounds. Pulmonary:      Effort: Pulmonary effort is normal.      Breath sounds: Wheezing present. Abdominal:      General: Abdomen is flat. Bowel sounds are normal.      Palpations: Abdomen is soft. Tenderness: There is abdominal tenderness. There is right CVA tenderness and left CVA tenderness. Positive signs include Real's sign. Neurological:      General: No focal deficit present. Mental Status: She is alert. Physical exam:       Vitals:    09/24/22 1930   BP: 93/67   Pulse:    Resp:    Temp:    SpO2: 95%       DATA:    Labs:  CBC:   Recent Labs     09/24/22  1517   WBC 20.4*   HGB 13.5   HCT 39.3          BMP:   Recent Labs     09/24/22  1517   *   K 3.7   CL 97*   CO2 20*   BUN 22*   CREATININE 2.0*   GLUCOSE 109*     LFT's:   Recent Labs     09/24/22  1517   AST 26   ALT 13   BILITOT 0.4   ALKPHOS 77     Troponin: No results for input(s): TROPONINI in the last 72 hours. BNP:No results for input(s): BNP in the last 72 hours. ABGs: No results for input(s): PHART, YTH4EDR, PO2ART in the last 72 hours.   INR: No results for input(s): INR in the last 72 hours.    U/A:  Recent Labs     09/24/22  1843   COLORU Yellow   PHUR 6.5   WBCUA *   RBCUA 3-4   BACTERIA 3+*   CLARITYU Clear   SPECGRAV 1.010   LEUKOCYTESUR LARGE*   UROBILINOGEN 0.2   BILIRUBINUR Negative   BLOODU SMALL*   GLUCOSEU Negative   AMORPHOUS Rare       CT CHEST ABDOMEN PELVIS W CONTRAST   Final Result      CHEST:   No acute bony process or consolidation      ABDOMEN AND PELVIS:      Mild right hydronephrosis and mild to moderate left hydronephrosis and hydroureter greater on the left with bladder wall thickening and without definitive obstructing stones. Correlate for cystitis and/or pyelonephritis with some enhancement around the    renal collecting systems suggesting infection, without obstructing stones identified      Nonspecific fluid-filled loops of small bowel may reflect an ileus. Moderate stool noted throughout the colon without obstructing lesion appreciated. Mild diverticulosis in the sigmoid region. XR CHEST PORTABLE   Final Result      1. No acute disease. ASSESSMENT AND PLAN:    Severe Sepsis 2/2 Pyelonephritis- Patient presented tachycardic, febrile with an elevated WBC of 20.1 and a positive U/A as the likely source. Given that she has evidence of fat stranding and kidney inflammation on CT, etiology is likely pyelo. Received sepsis bolus of 3L in ED along with vancocin and cefepime. - Urine Culture  - Blood Cultures x2  - Cefepime   - Lactic acid q6h  - NS for 150 ml/hr  - Daily BMP  - Daily CBC    2. Constipation - Patient has not passed a bowel movement in several days. CT shows fluid filled loops in the small bowel indicative of an ileus. Positive bowel sounds make ileus unlikely.    - Soap Suds  - Senokot        3. CALI - Patient has a creatinine of 2.0 on a background of 0.8.  Given b/l hydronephrosis and sepsis, likely both intra and post renal.   - Urine Sodium  - Urine Creatinine  - Post void residual  - Consult Urology    Chronic Problems  Carotid Stenosis s/p stent - Aspirin and Plavix  2. Anxiety  - Lexipro and Xanax prn   3.    Insomnia - Trazodone    Will discuss with attending physician Dr. Herrera Dean Status:Full code  FEN: Regular  PPX: Heparin  DISPO: Braulio Bennett MD  9/24/2022,  8:34 PM

## 2022-09-25 NOTE — FLOWSHEET NOTE
Pt assess completed per flow sheet. Pt denies any pain or needs. Sitting at edge of bed with 100 of dinner eaten. Pt walked to bathroom per self with steady gait. POC discussed with pt for night and all questions answered.

## 2022-09-25 NOTE — CONSULTS
Urology Attending Consult Note      Reason for Consultation: bilateral hydronephrosis; urosepsis    History: 59year old female admitted with fevers and bilateral flank pain. Patient placed on IV abx therapy for urosepsis. CT scan showed evidence of pyelonephritis but also showed bilateral hydronephrosis - left greater than right but no obstructing stones noted. Patient states flank pain improved today. Patient also with CALI. Family History, Social History, Review of Systems:  Reviewed and agreed to as per chart    Vitals:  BP 91/60   Pulse 86   Temp 98.3 °F (36.8 °C) (Oral)   Resp 19   Ht 5' 4\" (1.626 m)   Wt 143 lb 4.8 oz (65 kg)   SpO2 94%   BMI 24.60 kg/m²   Temp  Av.4 °F (38 °C)  Min: 98.3 °F (36.8 °C)  Max: 103.1 °F (39.5 °C)    Intake/Output Summary (Last 24 hours) at 2022 1156  Last data filed at 2022 1027  Gross per 24 hour   Intake 0 ml   Output 1400 ml   Net -1400 ml         Physical:  Well developed, well nourished in no acute distress  Mood indicates no abnormalities. Pt doesnt appear depressed  Orientated to time and place  Neck is supple, trachea is midline  Respiratory effort is normal  Cardiovascular show no extremity swelling  Abdomen no masses or hernias are palpated, there is no tenderness. Liver and Spleen appear normal.  Skin show no abnormal lesions  Lymph nodes are not palpated in the inguinal, neck, or axillary area.         Labs:  WBC:    Lab Results   Component Value Date/Time    WBC 19.3 2022 08:33 AM     Hemoglobin/Hematocrit:    Lab Results   Component Value Date/Time    HGB 11.3 2022 08:33 AM    HCT 34.2 2022 08:33 AM     BMP:    Lab Results   Component Value Date/Time     2022 08:33 AM    K 4.5 2022 08:33 AM     2022 08:33 AM    CO2 18 2022 08:33 AM    BUN 27 2022 08:33 AM    LABALBU 3.8 2022 03:17 PM    CREATININE 2.2 2022 08:33 AM    CALCIUM 7.7 2022 08:33 AM    GFRAA 27 09/25/2022 08:33 AM    LABGLOM 22 09/25/2022 08:33 AM     PT/INR:    Lab Results   Component Value Date/Time    PROTIME 11.0 07/19/2020 05:17 AM    INR 0.95 07/19/2020 05:17 AM     PTT:    Lab Results   Component Value Date/Time    APTT 33.7 07/13/2020 08:10 AM   [APTT    Urinalysis: 51- 100 wbc    Urine Culture: pending    Blood Culture: e coli    Antibiotic Therapy:  iv merrem    Imaging: CT  CHEST:   No acute bony process or consolidation       ABDOMEN AND PELVIS:       Mild right hydronephrosis and mild to moderate left hydronephrosis and hydroureter greater on the left with bladder wall thickening and without definitive obstructing stones. Correlate for cystitis and/or pyelonephritis with some enhancement around the    renal collecting systems suggesting infection, without obstructing stones identified       Nonspecific fluid-filled loops of small bowel may reflect an ileus. Moderate stool noted throughout the colon without obstructing lesion appreciated. Mild diverticulosis in the sigmoid region.                Impression/Plan: Urosepsis - on iv abx  Bilateral hydronephrosis concerning for obstruction though no obstructing ureteral stones noted    Options reviewed with pt - observation vs proceeding with cystoscopy and bilateral ureteral stent insertion - pt will proceed with cystoscopy    oJrdi Ortiz MD

## 2022-09-25 NOTE — PROGRESS NOTES
Pharmacy Note - Renal Dosing and Extended Infusion Beta-Lactam Adjustment    Meropenem ordered for treatment of EColi Bacteremia 2/2 pyelonephritis. Per [facility] Renal Dose Adjustment Policy and Extended Infusion Beta-Lactam Policy, Meropenem 1gm W8OJ will be changed to 1000 mg q12hr. Pt has antibiotic allergies. Estimated Creatinine Clearance: Estimated Creatinine Clearance: 25 mL/min (A) (based on SCr of 2 mg/dL (H)). Dialysis Status, CALI, CKD: CALI, baseline SCr= 1.0  BMI: Body mass index is 24.6 kg/m². Rationale for Adjustment: Agent is renally eliminated and demonstrates time-dependent effect on bacterial eradication. Extended-infusion dosing strategy aims to enhance microbiologic and clinical efficacy. Pharmacy will continue to monitor renal function, cultures and sensitivities (where available) and adjust dose as necessary. Please call with any questions.     Liz Cotton, PharmD, BCPS

## 2022-09-25 NOTE — ANESTHESIA POSTPROCEDURE EVALUATION
Department of Anesthesiology  Postprocedure Note    Patient: Yosvany Schuler  MRN: [id number]  YOB: 1960  Date of evaluation: 9/25/2022      Procedure Summary     Date: 09/25/22 Room / Location: [address] [place] [address]    Anesthesia Start: 1625 Anesthesia Stop: 7194    Procedure: CYSTOSCOPY RETROGRADE PYELOGRAM STENT INSERTION BILATERAL (Bilateral: Bladder) Diagnosis: Bilateral hydronephrosis    Surgeons: Doreen Mcgill MD Responsible Provider: Ynes Markham DO    Anesthesia Type: General ASA Status: 3 - Emergent          Anesthesia Type: General    Ирина Phase I:      Ирина Phase II:        Anesthesia Post Evaluation    Patient location during evaluation: PACU  Patient participation: complete - patient participated  Level of consciousness: awake and alert  Pain score: 0  Airway patency: patent  Nausea & Vomiting: no nausea and no vomiting  Complications: no  Cardiovascular status: hemodynamically stable  Respiratory status: acceptable  Hydration status: stable

## 2022-09-25 NOTE — PROGRESS NOTES
4 Eyes Admission Assessment     I agree as the admission nurse that 2 RN's have performed a thorough Head to Toe Skin Assessment on the patient. ALL assessment sites listed below have been assessed on admission. Areas assessed by both nurses: Laine Gregorio and [facility] [place], Face, and Ears   [x][x]   Shoulders, Back, and Chest  [x]   Arms, Elbows, and Hands   [x]   Coccyx, Sacrum, and Ischium  [x]   Legs, Feet, and Heels        Does the Patient have Skin Breakdown?   No         Faustino Prevention initiated:  NA   Wound Care Orders initiated:  NA      WOC nurse consulted for Pressure Injury (Stage 3,4, Unstageable, DTI, NWPT, and Complex wounds) or Faustino score 18 or lower:  NA      Nurse 1 eSignature: Electronically signed by Ibis Turpin RN on 9/25/22 at 5:21 AM EDT    **SHARE this note so that the co-signing nurse is able to place an eSignature**    Nurse 2 eSignature: Electronically signed by Zeb Patterson RN on 9/25/22 at 12:21 AM EDT

## 2022-09-25 NOTE — PROGRESS NOTES
Progress Note    Admit Date: 9/24/2022  Day: 1  Diet: ADULT DIET; Regular    CC: Dysuria    Interval history:  Pt laying bed in no distress. She states she is feeling better since presentation, however she continues to endorse abd distension, TTP of upper abdomen, pain in her bilateral flank L>R, and mild SOB at rest. She has not had a BM in 2-3 days, which is not normal for her (she has BM daily). She is s/p 5L IVF with BP 93/63 and HR 94. Satting well on RA. She is s/p 1 dose cefepime in ED. HPI:  RAGHAV is a 58 y/o female with a PMHx of HTN, anxiety, internal cartoid artery occlusion and cholecystectomy who presents to the ED complaining of dysuria. She said the dysuria started two weeks ago and has been progressively getting worse. Today, she said she had problems dressing herself because she could not think straight, so she came straight to the ED. She reports headache, confusion, abdominal pain and fullness, fever and constipation. Her last bowel movement was several days ago. Although she feels her stomach rumble, nothing passes through. She denies chest pain, lightheadedness and hematuria. She was recently treated for a UTI at the beginning of July and has completed a course of Macrobid. ED course:  Vitals:   BP   /65-90   Temp   103.1   Pulse   125   Resp   16-18   SpO2    94-97   Labs: WBC=20.4; Creatinine of 2.0; Vj=528; U/a + for nitrites, LE, bacteria  Imaging: CT shows mild R hydronephrosis and mild/moderate left hydronephrosis. No identifiable stones. Suggestion of pyelonephritis and cystitis given enhancement around renal collecting systems. CT also shows fluid-filled loops of small bowel.   Interventions: Sepsis bolus; Vancomycin and Cefepime      Medications:     Scheduled Meds:   methylPREDNISolone  125 mg IntraVENous Q12H    mometasone-formoterol  2 puff Inhalation BID    [Held by provider] sennosides-docusate sodium  2 tablet Oral BID    [Held by provider] magnesium hydroxide  30 mL Oral Once    meropenem  1,000 mg IntraVENous Q12H    aspirin  81 mg Oral Daily    escitalopram  10 mg Oral Daily    tiZANidine  4 mg Oral TID    traZODone  150 mg Oral Nightly    sodium chloride flush  5-40 mL IntraVENous 2 times per day    enoxaparin  30 mg SubCUTAneous Daily    [Held by provider] amLODIPine  10 mg Oral Daily    [Held by provider] lisinopril  20 mg Oral Daily    [Held by provider] metoprolol succinate  100 mg Oral Daily    pantoprazole  40 mg Oral QAM AC    nicotine  1 patch TransDERmal Daily    atorvastatin  20 mg Oral Nightly     Continuous Infusions:   sodium chloride      sodium chloride 100 mL/hr at 09/25/22 0800     PRN Meds:ipratropium-albuterol, acetaminophen, sodium chloride flush, sodium chloride, ondansetron **OR** ondansetron, polyethylene glycol, acetaminophen **OR** acetaminophen, albuterol, benzonatate, ALPRAZolam    Objective:   Vitals:   T-max:  Patient Vitals for the past 8 hrs:   BP Temp Temp src Pulse Resp SpO2 Weight   09/25/22 0904 -- -- -- 94 19 94 % --   09/25/22 0824 93/63 -- -- -- -- -- --   09/25/22 0719 91/65 98.7 °F (37.1 °C) Oral 89 20 93 % --   09/25/22 0625 84/60 -- -- 87 -- -- --   09/25/22 0436 (!) 71/53 98.6 °F (37 °C) Oral 93 20 94 % 143 lb 4.8 oz (65 kg)       Intake/Output Summary (Last 24 hours) at 9/25/2022 1024  Last data filed at 9/25/2022 0858  Gross per 24 hour   Intake 0 ml   Output 900 ml   Net -900 ml       Review of Systems   Constitutional:  Negative for chills and fever. HENT:  Negative for sore throat. Eyes:  Negative for visual disturbance. Respiratory:  Positive for shortness of breath. Negative for cough and wheezing. Cardiovascular:  Negative for chest pain and palpitations. Gastrointestinal:  Positive for abdominal distention, abdominal pain and constipation. Negative for diarrhea, nausea and vomiting. Genitourinary:  Positive for dysuria and frequency. Musculoskeletal:  Negative for back pain. Skin:  Negative for rash. Neurological:  Negative for headaches. Psychiatric/Behavioral:  Negative for confusion. Physical Exam  Constitutional:       General: She is not in acute distress. Appearance: Normal appearance. She is not ill-appearing. HENT:      Head: Normocephalic and atraumatic. Mouth/Throat:      Mouth: Mucous membranes are dry. Eyes:      Pupils: Pupils are equal, round, and reactive to light. Cardiovascular:      Rate and Rhythm: Regular rhythm. Tachycardia present. Pulmonary:      Effort: Pulmonary effort is normal. No respiratory distress. Breath sounds: Wheezing present. Abdominal:      General: Bowel sounds are normal. There is distension. Palpations: Abdomen is soft. Tenderness: There is abdominal tenderness (TTP to b/l flank, RUQ, LUQ). Musculoskeletal:      Right lower leg: No edema. Left lower leg: No edema. Skin:     General: Skin is warm and dry. Neurological:      General: No focal deficit present. Mental Status: She is alert and oriented to person, place, and time. Psychiatric:         Mood and Affect: Mood normal.         Behavior: Behavior normal.       LABS:    CBC:   Recent Labs     09/24/22  1517 09/25/22  0833   WBC 20.4* 19.3*   HGB 13.5 11.3*   HCT 39.3 34.2*    127*   MCV 88.2 89.9     Renal:    Recent Labs     09/24/22  1517 09/25/22  0833   * 137   K 3.7 4.5   CL 97* 105   CO2 20* 18*   BUN 22* 27*   CREATININE 2.0* 2.2*   GLUCOSE 109* 78   CALCIUM 8.9 7.7*   ANIONGAP 15 14     Hepatic:   Recent Labs     09/24/22  1517   AST 26   ALT 13   BILITOT 0.4   PROT 6.8   LABALBU 3.8   ALKPHOS 77     Troponin: No results for input(s): TROPONINI in the last 72 hours. BNP: No results for input(s): BNP in the last 72 hours. Lipids: No results for input(s): CHOL, HDL in the last 72 hours.     Invalid input(s): LDLCALCU, TRIGLYCERIDE  ABGs:  No results for input(s): PHART, NJU2LAR, PO2ART, FIY8IKU, BEART, THGBART, A4UFLBXV, ROS2HWT in the last 72 hours. INR: No results for input(s): INR in the last 72 hours. Lactate: No results for input(s): LACTATE in the last 72 hours. Cultures:  -----------------------------------------------------------------  RAD:   CT CHEST ABDOMEN PELVIS W CONTRAST   Final Result      CHEST:   No acute bony process or consolidation      ABDOMEN AND PELVIS:      Mild right hydronephrosis and mild to moderate left hydronephrosis and hydroureter greater on the left with bladder wall thickening and without definitive obstructing stones. Correlate for cystitis and/or pyelonephritis with some enhancement around the    renal collecting systems suggesting infection, without obstructing stones identified      Nonspecific fluid-filled loops of small bowel may reflect an ileus. Moderate stool noted throughout the colon without obstructing lesion appreciated. Mild diverticulosis in the sigmoid region. XR CHEST PORTABLE   Final Result      1. No acute disease. Assessment/Plan:   RAGHAV is a 58 y/o female with a PMHx of HTN, anxiety, internal cartoid artery occlusion and cholecystectomy who presents to the ED complaining of dysuria. Severe Sepsis 2/2 Pyelonephritis  Patient presented tachycardic, febrile with an elevated WBC of 20.1 and a positive U/A as the likely source. Given that she has evidence of fat stranding and kidney inflammation on CT, etiology is likely pyelo. Received sepsis bolus of 3L in ED along with vancocin and cefepime. Lactate uptrending to 5.0 (1.6) this AM, however repeat normalized. - urology consulted - appreciate recs  - nephrology consulted - appreciate recs  - start merrem IV (9/25- )  - f/u stat lactate - normalized 9/25 AM   - if hypotensive, get STAT lactate  - stop stress dose steroids  - cont NS @ 150 ml/hr  - f/u Urine Culture  - f/u Blood Cultures x2  - Daily BMP  - Daily CBC     Constipation  Patient has not passed a bowel movement in several days.  CT shows fluid filled loops in the small bowel indicative of an ileus. Positive bowel sounds make ileus unlikely.    - Soap Suds  - Senokot       CALI  Patient has a creatinine of 2.0 on a background of 0.8. Given b/l hydronephrosis and sepsis, likely both intra and post renal. Making good urine 9/25 AM.  - Urine Sodium  - Urine Creatinine  - Post void residual  - Consult Urology - appreciate recs     Chronic Problems  Carotid Stenosis s/p stent - Aspirin and Plavix  2. Anxiety  - Lexipro and Xanax prn   3. Insomnia - Trazodone    Code Status: FULL  FEN: Regular  PPX: lovenox  DISPO: wards    Dmitriy Floyd MD, PGY-1  09/25/22  10:24 AM    This patient has been staffed and discussed with Dr. Army Johnson.

## 2022-09-25 NOTE — ANESTHESIA PRE PROCEDURE
for Sleep.      Historical Provider, MD   aspirin 81 MG chewable tablet Take 81 mg by mouth daily    Historical Provider, MD   amLODIPine (NORVASC) 10 MG tablet Take 10 mg by mouth daily    Historical Provider, MD   metoprolol succinate (TOPROL XL) 50 MG extended release tablet Take 100 mg by mouth daily Only take evening dose if BP is high    Historical Provider, MD   traZODone (DESYREL) 100 MG tablet Take 200 mg by mouth nightly    Historical Provider, MD   escitalopram (LEXAPRO) 10 MG tablet Take 10 mg by mouth daily    Historical Provider, MD   pantoprazole sodium (PROTONIX) 40 MG PACK packet Take 40 mg by mouth nightly    Historical Provider, MD       Current medications:    Current Facility-Administered Medications   Medication Dose Route Frequency Provider Last Rate Last Admin    ipratropium-albuterol (DUONEB) nebulizer solution 1 ampule  1 ampule Inhalation Q4H PRN Keturah Quinonez MD        mometasone-formoterol [facility]) 100-5 MCG/ACT inhaler 2 puff  2 puff Inhalation BID Keturah Conley MD   2 puff at 09/25/22 0903    sennosides-docusate sodium (SENOKOT-S) 8.6-50 MG tablet 2 tablet  2 tablet Oral BID Carlota Degroot MD   2 tablet at 09/25/22 1158    meropenem (MERREM) 1,000 mg in sodium chloride 0.9 % 100 mL IVPB (mini-bag)  1,000 mg IntraVENous Q12H Carlota Degroot MD        lactated ringers infusion   IntraVENous Continuous Gaye Tenorio MD        sterile water for irrigation    PRN Mango Gaspar MD   1,000 mL at 09/25/22 1644    acetaminophen (TYLENOL) tablet 1,000 mg  1,000 mg Oral Q6H PRN Sancho Vivar MD        aspirin chewable tablet 81 mg  81 mg Oral Daily Sancho Vivar MD   81 mg at 09/25/22 0842    escitalopram (LEXAPRO) tablet 10 mg  10 mg Oral Daily Sancho Vivar MD   10 mg at 09/25/22 0843    tiZANidine (ZANAFLEX) tablet 4 mg  4 mg Oral TID Sancho Vivar MD   4 mg at 09/24/22 2317    traZODone (DESYREL) tablet 150 mg  150 mg Oral Nightly Sancho Vivar MD   150 mg at 09/24/22 2316    sodium chloride flush 0.9 % injection 5-40 mL  5-40 mL IntraVENous 2 times per day John Jacobs MD   10 mL at 09/24/22 2317    sodium chloride flush 0.9 % injection 5-40 mL  5-40 mL IntraVENous PRN John Jacobs MD        0.9 % sodium chloride infusion   IntraVENous PRN John Jacobs MD        enoxaparin Sodium (LOVENOX) injection 30 mg  30 mg SubCUTAneous Daily John Jacobs MD   30 mg at 09/25/22 0842    ondansetron (ZOFRAN-ODT) disintegrating tablet 4 mg  4 mg Oral Q8H PRN John Jacobs MD        Or    ondansetron (ZOFRAN) injection 4 mg  4 mg IntraVENous Q6H PRN John Jacobs MD        polyethylene glycol (GLYCOLAX) packet 17 g  17 g Oral Daily PRN John Jacobs MD        acetaminophen (TYLENOL) tablet 650 mg  650 mg Oral Q6H PRN John Jacobs MD        Or    acetaminophen (TYLENOL) suppository 650 mg  650 mg Rectal Q6H PRN John Jacobs MD        [Held by provider] amLODIPine (NORVASC) tablet 10 mg  10 mg Oral Daily Samuel Alejandro MD        [Held by provider] lisinopril (PRINIVIL;ZESTRIL) tablet 20 mg  20 mg Oral Daily Samuel Alejandro MD        [Held by provider] metoprolol succinate (TOPROL XL) extended release tablet 100 mg  100 mg Oral Daily Samuel Alejandro MD        pantoprazole (PROTONIX) tablet 40 mg  40 mg Oral QAM AC Samuel Alejandro MD   40 mg at 09/25/22 0641    nicotine (NICODERM CQ) 21 MG/24HR 1 patch  1 patch TransDERmal Daily John Jacobs MD        albuterol (PROVENTIL) nebulizer solution 2.5 mg  2.5 mg Nebulization 4x Daily PRN John Jacobs MD        benzonatate (TESSALON) capsule 100 mg  100 mg Oral TID PRN John Jacobs MD   100 mg at 09/24/22 2316    ALPRAZolam (XANAX) tablet 0.5 mg  0.5 mg Oral TID PRN John Jacobs MD   0.5 mg at 09/24/22 2316    atorvastatin (LIPITOR) tablet 20 mg  20 mg Oral Nightly John Jacobs MD   20 mg at 09/24/22 2317     Facility-Administered Medications Ordered in Other Encounters   Medication Dose Route Frequency Provider Last Rate Last Admin    lactated ringers infusion   IntraVENous Continuous PRN Gonzalez Mindy, DO   New Bag at 09/25/22 1625    fentaNYL (SUBLIMAZE) injection   IntraVENous PRN Gonzalez Mindy, DO   50 mcg at 09/25/22 2144    succinylcholine (ANECTINE) injection   IntraVENous PRN Gonzalez Mindy, DO   140 mg at 09/25/22 5975    propofol injection   IntraVENous PRN Gonzalez Mindy, DO   140 mg at 09/25/22 1632    rocuronium [facility]) injection   IntraVENous PRN Gonzalez Mindy, DO   10 mg at 09/25/22 1632    ondansetron (ZOFRAN) injection   IntraVENous PRN Gonzalez Mindy, DO   4 mg at 09/25/22 1647       Allergies:     Allergies   Allergen Reactions    Penicillins Nausea And Vomiting and Hives    Hydrochlorothiazide      Hyponatremia    Nut  [Macadamia Nut Oil] Hives    Peanut (Diagnostic) Hives and Swelling    Peanut-Containing Drug Products Hives       Problem List:    Patient Active Problem List   Diagnosis Code    Hyponatremia E87.1    Hypokalemia E87.6    Internal carotid artery occlusion, left I65.22    Leukopenia D72.819    Hepatitis C B19.20    HTN (hypertension) I10    Severe sepsis with acute organ dysfunction (HCC) A41.9, R65.20    Acute pyelonephritis N10    CALI (acute kidney injury) ([facility] [address] N17.9    Lactic acidosis E87.2    Hyperglycemia R73.9    Gastroesophageal reflux disease without esophagitis K21.9    Hyperlipidemia E78.5       Past Medical History:        Diagnosis Date    Anxiety     Arthritis     Hepatitis C     Hyperlipidemia     Hypertension     Reflux esophagitis        Past Surgical History:        Procedure Laterality Date    CAROTID ENDARTERECTOMY Left 7/20/2020    LEFT CAROTID ENDARTERECTOMY WITH INTRAOPERATIVE DUPLEX SCAN performed by Wilfredo Dumont MD at [place] [address]      left arm    JOINT REPLACEMENT      knee       Social History:    Social History     Tobacco Use    Smoking status: Every Day     Packs/day: 1.00     Years: 23.00     Pack years: 23.00     Types: Cigarettes    Smokeless tobacco: Never   Substance Use Topics    Alcohol use: Yes     Comment: soc                                Ready to quit: Not Answered  Counseling given: Not Answered      Vital Signs (Current):   Vitals:    09/25/22 0824 09/25/22 0904 09/25/22 1144 09/25/22 1300   BP: 93/63  91/60 93/60   Pulse:  94 86    Resp:  19 19    Temp:   98.3 °F (36.8 °C)    TempSrc:   Oral    SpO2:  94% 94%    Weight:       Height:                                                  BP Readings from Last 3 Encounters:   09/25/22 93/60   07/27/22 (!) 132/95   02/22/22 120/70       NPO Status: Time of last liquid consumption: 1000                        Time of last solid consumption: 1000                        Date of last liquid consumption: 09/25/22 (10 am - coffee)                        Date of last solid food consumption: 09/25/22    BMI:   Wt Readings from Last 3 Encounters:   09/25/22 143 lb 4.8 oz (65 kg)   02/22/22 158 lb 3.2 oz (71.8 kg)   07/21/21 144 lb (65.3 kg)     Body mass index is 24.6 kg/m².     CBC:   Lab Results   Component Value Date/Time    WBC 19.3 09/25/2022 08:33 AM    RBC 3.80 09/25/2022 08:33 AM    HGB 11.3 09/25/2022 08:33 AM    HCT 34.2 09/25/2022 08:33 AM    MCV 89.9 09/25/2022 08:33 AM    RDW 15.6 09/25/2022 08:33 AM     09/25/2022 08:33 AM       CMP:   Lab Results   Component Value Date/Time     09/25/2022 08:33 AM    K 4.5 09/25/2022 08:33 AM     09/25/2022 08:33 AM    CO2 18 09/25/2022 08:33 AM    BUN 27 09/25/2022 08:33 AM    CREATININE 2.2 09/25/2022 08:33 AM    GFRAA 27 09/25/2022 08:33 AM    AGRATIO 1.3 09/24/2022 03:17 PM    LABGLOM 22 09/25/2022 08:33 AM    GLUCOSE 78 09/25/2022 08:33 AM    PROT 6.8 09/24/2022 03:17 PM    CALCIUM 7.7 09/25/2022 08:33 AM    BILITOT 0.4 09/24/2022 03:17 PM    ALKPHOS 77 09/24/2022 03:17 PM    AST 26 09/24/2022 03:17 PM    ALT 13 09/24/2022 03:17 PM       POC Tests: No results for input(s): POCGLU, POCNA, POCK, POCCL, POCBUN, Allan Archuleta in the last 72 hours. Coags:   Lab Results   Component Value Date/Time    PROTIME 11.0 07/19/2020 05:17 AM    INR 0.95 07/19/2020 05:17 AM    APTT 33.7 07/13/2020 08:10 AM       HCG (If Applicable): No results found for: PREGTESTUR, PREGSERUM, HCG, HCGQUANT     ABGs: No results found for: PHART, PO2ART, ROT3MVJ, VLO6AQF, BEART, E3ZGOZZU     Type & Screen (If Applicable):  No results found for: LABABO, LABRH    Drug/Infectious Status (If Applicable):  No results found for: HIV, HEPCAB    COVID-19 Screening (If Applicable):   Lab Results   Component Value Date/Time    COVID19 NOT DETECTED 09/24/2022 03:17 PM           Anesthesia Evaluation  Patient summary reviewed and Nursing notes reviewed  Airway: Mallampati: II  TM distance: >3 FB   Neck ROM: full  Mouth opening: > = 3 FB   Dental:    (+) upper dentures and lower dentures      Pulmonary: breath sounds clear to auscultation  (+) COPD: moderate,  current smoker (1 ppd x 23 yrs)          Patient did not smoke on day of surgery. Cardiovascular:  Exercise tolerance: good (>4 METS),   (+) hypertension: moderate,     (-) past MI    NYHA Classification: II    Rhythm: regular  Rate: normal           Beta Blocker:  Dose within 24 Hrs         Neuro/Psych:               GI/Hepatic/Renal:   (+) GERD: well controlled, hepatitis (allegedly tx for?): C, renal disease (cr 2.2 bilateral renal stones   wbc 19K  urosepsis ):,           Endo/Other: Negative Endo/Other ROS                    Abdominal:             Vascular:           ROS comment: Pt claims she is not on plavix  Then unsure of meds ? ? Left carotid total occlusion . Other Findings:           Anesthesia Plan      general     ASA 3 - emergent           MIPS: Prophylactic antiemetics administered. Anesthetic plan and risks discussed with patient.                         Juhi Gaines DO   9/25/2022

## 2022-09-25 NOTE — PROGRESS NOTES
Pharmacy Note - Renal Dosing and Extended Infusion Beta-Lactam Adjustment    Cefepime ordered for treatment of bloodstream infection 2/2 to UTI. Per [facility] Renal Dose Adjustment Policy and Extended Infusion Beta-Lactam Policy, dosing will be changed to cefepime 1000 mg IV q 12hr- EI. Estimated Creatinine Clearance: Estimated Creatinine Clearance: 25 mL/min (A) (based on SCr of 2 mg/dL (H)). Dialysis Status, CALI, CKD: CrCl @ 25 ml/min  BMI: Body mass index is 24.03 kg/m². Rationale for Adjustment: Agent is renally eliminated and demonstrates time-dependent effect on bacterial eradication. Extended-infusion dosing strategy aims to enhance microbiologic and clinical efficacy. Pharmacy will continue to monitor renal function, cultures and sensitivities (where available) and adjust dose as necessary. Please call with any questions.     Suman Fraser [facility], PharmD, [address]  9/24/2022 10:18 PM

## 2022-09-25 NOTE — BRIEF OP NOTE
Brief Postoperative Note      Patient: Leobardo Smith  YOB: 1960  MRN: [id number]    Date of Procedure: 9/25/2022    Pre-Op Diagnosis: BILATERAL HYDRONEPHROSIS    Post-Op Diagnosis: Same       Procedure(s):  CYSTOSCOPY RETROGRADE PYELOGRAM STENT INSERTION BILATERAL    Surgeon(s):  Otilia Blount MD    Assistant:  * No surgical staff found *    Anesthesia: General    Estimated Blood Loss (mL): Minimal    Complications: None    Specimens:   * No specimens in log *    Implants:  * No implants in log *      Drains: * No LDAs found *    Findings: mild bilateral hydronephrosis    Electronically signed by Otilia Blount MD on 9/25/2022 at 5:07 PM

## 2022-09-25 NOTE — PLAN OF CARE
Problem: Infection - Adult  Goal: Absence of infection during hospitalization  9/25/2022 1733 by Delroy Haddad RN  Outcome: Progressing     Problem: Discharge Planning  Goal: Discharge to home or other facility with appropriate resources  9/25/2022 1733 by Delroy Haddad RN  Outcome: Progressing     Problem: Safety - Adult  Goal: Free from fall injury  9/25/2022 1733 by Delroy Haddad RN  Outcome: Progressing  Flowsheets (Taken 9/25/2022 0831)  Free From Fall Injury:   Instruct family/caregiver on patient safety   Based on caregiver fall risk screen, instruct family/caregiver to ask for assistance with transferring infant if caregiver noted to have fall risk factors

## 2022-09-25 NOTE — PLAN OF CARE
Problem: Infection - Adult  Goal: Absence of infection at discharge  Outcome: Progressing  Flowsheets  Taken 9/24/2022 2200  Absence of infection at discharge:   Assess and monitor for signs and symptoms of infection   Monitor lab/diagnostic results   [place] appropriate cooling/warming therapies per order   Monitor all insertion sites i.e., indwelling lines, tubes and drains   Monitor endotracheal (as able) and nasal secretions for changes in amount and color   Administer medications as ordered  Taken 9/24/2022 2000  Absence of infection at discharge:   Assess and monitor for signs and symptoms of infection   Monitor endotracheal (as able) and nasal secretions for changes in amount and color   Administer medications as ordered   [place] appropriate cooling/warming therapies per order   Monitor lab/diagnostic results   Monitor all insertion sites i.e., indwelling lines, tubes and drains  Goal: Absence of infection during hospitalization  Outcome: Progressing  Flowsheets  Taken 9/24/2022 2200  Absence of infection during hospitalization:   Monitor lab/diagnostic results   Monitor all insertion sites i.e., indwelling lines, tubes and drains   [place] appropriate cooling/warming therapies per order   Administer medications as ordered  Taken 9/24/2022 2000  Absence of infection during hospitalization:   Administer medications as ordered   [place] appropriate cooling/warming therapies per order   Monitor all insertion sites i.e., indwelling lines, tubes and drains   Monitor endotracheal (as able) and nasal secretions for changes in amount and color     Problem: ABCDS Injury Assessment  Goal: Absence of physical injury  Outcome: Progressing  Flowsheets (Taken 9/24/2022 2000)  Absence of Physical Injury: Implement safety measures based on patient assessment     Problem: Discharge Planning  Goal: Discharge to home or other facility with appropriate resources  Outcome: Progressing  Flowsheets (Taken 9/24/2022 2200)  Discharge to home or other facility with appropriate resources:   Identify barriers to discharge with patient and caregiver   Identify discharge learning needs (meds, wound care, etc)   Refer to discharge planning if patient needs post-hospital services based on physician order or complex needs related to functional status, cognitive ability or social support system   Arrange for needed discharge resources and transportation as appropriate   Arrange for interpreters to assist at discharge as needed     Problem: Safety - Adult  Goal: Free from fall injury  Outcome: Progressing  Flowsheets (Taken 9/24/2022 2000)  Free From Fall Injury: Based on caregiver fall risk screen, instruct family/caregiver to ask for assistance with transferring infant if caregiver noted to have fall risk factors     Problem: ABCDS Injury Assessment  Goal: Absence of physical injury  Outcome: Progressing  Flowsheets (Taken 9/24/2022 2000)  Absence of Physical Injury: Implement safety measures based on patient assessment

## 2022-09-25 NOTE — RT PROTOCOL NOTE
RT Inhaler-Nebulizer Bronchodilator Protocol Note    There is a bronchodilator order in the chart from a provider indicating to follow the RT Bronchodilator Protocol and there is an Initiate RT Inhaler-Nebulizer Bronchodilator Protocol order as well (see protocol at bottom of note). CXR Findings:  XR CHEST PORTABLE    Result Date: 9/24/2022  1. No acute disease. The findings from the last RT Protocol Assessment were as follows:   History Pulmonary Disease: Smoker 15 pack years or more  Respiratory Pattern: Regular pattern and RR 12-20 bpm  Breath Sounds: Slightly diminished and/or crackles  Cough: Strong, spontaneous, non-productive  Indication for Bronchodilator Therapy: Decreased or absent breath sounds  Bronchodilator Assessment Score: 3    Aerosolized bronchodilator medication orders have been revised according to the RT Inhaler-Nebulizer Bronchodilator Protocol below. Respiratory Therapist to perform RT Therapy Protocol Assessment initially then follow the protocol. Repeat RT Therapy Protocol Assessment PRN for score 0-3 or on second treatment, BID, and PRN for scores above 3. No Indications - adjust the frequency to every 6 hours PRN wheezing or bronchospasm, if no treatments needed after 48 hours then discontinue using Per Protocol order mode. If indication present, adjust the RT bronchodilator orders based on the Bronchodilator Assessment Score as indicated below. Use Inhaler orders unless patient has one or more of the following: on home nebulizer, not able to hold breath for 10 seconds, is not alert and oriented, cannot activate and use MDI correctly, or respiratory rate 25 breaths per minute or more, then use the equivalent nebulizer order(s) with same Frequency and PRN reasons based on the score. If a patient is on this medication at home then do not decrease Frequency below that used at home.     0-3 - enter or revise RT bronchodilator order(s) to equivalent RT Bronchodilator order with Frequency of every 4 hours PRN for wheezing or increased work of breathing using Per Protocol order mode. 4-6 - enter or revise RT Bronchodilator order(s) to two equivalent RT bronchodilator orders with one order with BID Frequency and one order with Frequency of every 4 hours PRN wheezing or increased work of breathing using Per Protocol order mode. 7-10 - enter or revise RT Bronchodilator order(s) to two equivalent RT bronchodilator orders with one order with TID Frequency and one order with Frequency of every 4 hours PRN wheezing or increased work of breathing using Per Protocol order mode. 11-13 - enter or revise RT Bronchodilator order(s) to one equivalent RT bronchodilator order with QID Frequency and an Albuterol order with Frequency of every 4 hours PRN wheezing or increased work of breathing using Per Protocol order mode. Greater than 13 - enter or revise RT Bronchodilator order(s) to one equivalent RT bronchodilator order with every 4 hours Frequency and an Albuterol order with Frequency of every 2 hours PRN wheezing or increased work of breathing using Per Protocol order mode. RT to enter RT Home Evaluation for COPD & MDI Assessment order using Per Protocol order mode.     Electronically signed by Chel Smith RCP on 9/25/2022 at 4:07 AM

## 2022-09-25 NOTE — PROGRESS NOTES
Patient arrived from ED anxious, shaking, HR in 160s on the monitor and temp of 101.9. EKG was obtained and showed Sinus tach. Doctor was notified and new orders were received. Patient was given tynelol and lopressor 5mg. Patient's temp and HR went back WNL. This morning patient BP dropped in 46S systolic. New orders were received from the doctor to bolus a liter of fluid. Patient BP is slowily coming back up to the normal limit.

## 2022-09-25 NOTE — CONSULTS
[facility] NEPHROLOGY    [facility]              [phone]                       Plan :     Check urine lites  Check beta hydroxybutyrate. Continue with IV fluids. Switch to LR given the patient's acidosis. Follow with potassium trend. Follow with urine output. Hopefully patient will go to the OR and that if there is any obstructive issue that can be resolved. Keep mean arterial pressure greater than 65. Avoid any nephrotoxins  Give another liter of LR bolus before the OR as the patient will be high risk for getting worsening septic shock if there is an obstruction that will be resolved. Assessment :     Acute kidney injury:  Baseline creatinine around 1. Creatinine at the time of consultation is 2.2. Etiology of acute kidney injury is likely related to severe hemodynamic insult in the setting of sepsis relative hypotension and potentially bilateral obstructive issues as well given the hydronephrosis on the scan although no stones are seen on the scan    Hypotension:  Goal mean arterial pressure greater than 65    Acidosis:  Anion gap metabolic acidosis in the setting of lactic acidosis and acute kidney injury rule out ketosis         [facility] Nephrology would like to thank Chalino Arzate MD   for opportunity to serve this patient      Please call with questions at-   24 Hrs Answering service [phone] or  7 am- 5 pm via Perfect serve or cell phone  Tahmina Peterson MD          CC/reason for consult :     Acute kidney injury     HPI :     Jessica Vasquez is a 59 y.o. female with past medical history significant for hypertension, anxiety, carotid artery occlusion in the past along with history of cholecystectomy presented to emergency room with significant dysuria and flank pain. Onset of the patient's symptoms are subtle. Started about 2 weeks back progressively got worse associated with significant flank discomfort and dysuria.   Nothing was making the symptoms better or worse.  She was reporting significant headache and fever along with all this. Significant constipation. Patient was recently diagnosed with urinary tract infection was treated with antibiotic for that as well. He was admitted to the intensive care unit for sepsis and acute kidney injury. Given the patient's acute kidney injury nephrology consulted to assist with care     Interval History:     -Feels weak    ROS:     Seen with-no family in the room    positives in bold   Constitutional:  fever, chills, weakness, weight change, fatigue  Skin:  rash, pruritus, hair loss, bruising, dry skin, petechiae  Head, Face, Neck   headaches, swelling,  cervical adenopathy  Respiratory: shortness of breath, cough, or wheezing  Cardiovascular: chest pain, palpitations, dizzy, edema  Gastrointestinal: nausea, vomiting, diarrhea, constipation,belly pain    Yellow skin, blood in stool  Musculoskeletal:  back pain, muscle weakness, gait problems,       joint pain or swelling. Genitourinary:  dysuria, poor urine flow, flank pain, blood in urine  Neurologic:  vertigo, TIA'S, syncope, seizures, focal weakness  Psychosocial:  insomnia, anxiety, or depression. Additional positive findings:                          All other remaining systems are negative. PMH/PSH/SH/Family History:     Past Medical History:   Diagnosis Date    Anxiety     Arthritis     Hepatitis C     Hyperlipidemia     Hypertension     Reflux esophagitis        Past Surgical History:   Procedure Laterality Date    CAROTID ENDARTERECTOMY Left 7/20/2020    LEFT CAROTID ENDARTERECTOMY WITH INTRAOPERATIVE DUPLEX SCAN performed by Saundra Louise MD at [facility]      left arm    JOINT REPLACEMENT      knee       Social History     Socioeconomic History    Marital status:       Spouse name: Not on file    Number of children: Not on file    Years of education: Not on file    Highest education level: Not on file   Occupational History Not on file   Tobacco Use    Smoking status: Every Day     Packs/day: 1.00     Years: 23.00     Pack years: 23.00     Types: Cigarettes    Smokeless tobacco: Never   Vaping Use    Vaping Use: Never used   Substance and Sexual Activity    Alcohol use: Yes     Comment: soc    Drug use: Never    Sexual activity: Not on file   Other Topics Concern    Not on file   Social History Narrative    Not on file     Social Determinants of Health     Financial Resource Strain: Not on file   Food Insecurity: Not on file   Transportation Needs: Not on file   Physical Activity: Not on file   Stress: Not on file   Social Connections: Not on file   Intimate Partner Violence: Not on file   Housing Stability: Not on file       History reviewed. No pertinent family history.        Medication:      mometasone-formoterol  2 puff Inhalation BID    sennosides-docusate sodium  2 tablet Oral BID    meropenem  1,000 mg IntraVENous Q12H    aspirin  81 mg Oral Daily    escitalopram  10 mg Oral Daily    tiZANidine  4 mg Oral TID    traZODone  150 mg Oral Nightly    sodium chloride flush  5-40 mL IntraVENous 2 times per day    enoxaparin  30 mg SubCUTAneous Daily    [Held by provider] amLODIPine  10 mg Oral Daily    [Held by provider] lisinopril  20 mg Oral Daily    [Held by provider] metoprolol succinate  100 mg Oral Daily    pantoprazole  40 mg Oral QAM AC    nicotine  1 patch TransDERmal Daily    atorvastatin  20 mg Oral Nightly     ipratropium-albuterol, acetaminophen, sodium chloride flush, sodium chloride, ondansetron **OR** ondansetron, polyethylene glycol, acetaminophen **OR** acetaminophen, albuterol, benzonatate, ALPRAZolam       Vitals :     BP 93/60   Pulse 86   Temp 98.3 °F (36.8 °C) (Oral)   Resp 19   Ht 5' 4\" (1.626 m)   Wt 143 lb 4.8 oz (65 kg)   SpO2 94%   BMI 24.60 kg/m²       I & O :       Intake/Output Summary (Last 24 hours) at 9/25/2022 1418  Last data filed at 9/25/2022 1234  Gross per 24 hour   Intake 0 ml   Output 1800 ml   Net -1800 ml        Physical Examination :     General appearance: Awake pleasant laying in the bed  HEENT: Lips- normal, teeth- ok , oral mucosa- moist  Neck : Mass- no, appears symmetrical, JVD- not visible  Respiratory: Respiratory effort-none, wheeze- no, basilar rales  Cardiovascular:  [id number], Edema positive  Abdomen: visible mass- no, distention- no, scar- no, tenderness- no                            hepatosplenomegaly-  no  Musculoskeletal:  clubbing no,cyanosis- no , digital ischemia- no                           muscle strength- grossly normal , tone - grossly normal  Skin: rashes- no , ulcers- no, induration- no, tightening - no  Psychiatric: Mood stable cooperative clinician   LABS:     Recent Labs     09/24/22  1517 09/25/22  0833   WBC 20.4* 19.3*   HGB 13.5 11.3*   HCT 39.3 34.2*    127*     Recent Labs     09/24/22  1517 09/25/22  0833   * 137   K 3.7 4.5   CL 97* 105   CO2 20* 18*   BUN 22* 27*   CREATININE 2.0* 2.2*   GLUCOSE 109* 78

## 2022-09-26 ENCOUNTER — APPOINTMENT (OUTPATIENT)
Dept: GENERAL RADIOLOGY | Age: 62
DRG: 720 | End: 2022-09-26
Payer: MEDICAID

## 2022-09-26 PROBLEM — R78.81 E COLI BACTEREMIA: Status: ACTIVE | Noted: 2022-09-26

## 2022-09-26 PROBLEM — B96.20 E COLI BACTEREMIA: Status: ACTIVE | Noted: 2022-09-26

## 2022-09-26 PROBLEM — N39.0 COMPLICATED UTI (URINARY TRACT INFECTION): Status: ACTIVE | Noted: 2022-09-26

## 2022-09-26 LAB
ANION GAP SERPL CALCULATED.3IONS-SCNC: 12 MMOL/L (ref 3–16)
ANISOCYTOSIS: ABNORMAL
BASOPHILS ABSOLUTE: 0 K/UL (ref 0–0.2)
BASOPHILS RELATIVE PERCENT: 0.2 %
BUN BLDV-MCNC: 29 MG/DL (ref 7–20)
CALCIUM SERPL-MCNC: 7.9 MG/DL (ref 8.3–10.6)
CHLORIDE BLD-SCNC: 110 MMOL/L (ref 99–110)
CO2: 22 MMOL/L (ref 21–32)
CREAT SERPL-MCNC: 1.7 MG/DL (ref 0.6–1.2)
EKG ATRIAL RATE: 156 BPM
EKG DIAGNOSIS: NORMAL
EKG P AXIS: 51 DEGREES
EKG P-R INTERVAL: 114 MS
EKG Q-T INTERVAL: 254 MS
EKG QRS DURATION: 58 MS
EKG QTC CALCULATION (BAZETT): 409 MS
EKG R AXIS: 68 DEGREES
EKG T AXIS: -14 DEGREES
EKG VENTRICULAR RATE: 156 BPM
EOSINOPHILS ABSOLUTE: 0.3 K/UL (ref 0–0.6)
EOSINOPHILS RELATIVE PERCENT: 1 %
GFR AFRICAN AMERICAN: 37
GFR NON-AFRICAN AMERICAN: 30
GLUCOSE BLD-MCNC: 124 MG/DL (ref 70–99)
GLUCOSE BLD-MCNC: 149 MG/DL (ref 70–99)
HCT VFR BLD CALC: 33.6 % (ref 36–48)
HEMOGLOBIN: 11.4 G/DL (ref 12–16)
LACTIC ACID: 1.8 MMOL/L (ref 0.4–2)
LACTIC ACID: 2.6 MMOL/L (ref 0.4–2)
LYMPHOCYTES ABSOLUTE: 0.3 K/UL (ref 1–5.1)
LYMPHOCYTES RELATIVE PERCENT: 1 %
MCH RBC QN AUTO: 30.2 PG (ref 26–34)
MCHC RBC AUTO-ENTMCNC: 33.8 G/DL (ref 31–36)
MCV RBC AUTO: 89.4 FL (ref 80–100)
MONOCYTES ABSOLUTE: 0.7 K/UL (ref 0–1.3)
MONOCYTES RELATIVE PERCENT: 2.9 %
NEUTROPHILS ABSOLUTE: 24.3 K/UL (ref 1.7–7.7)
NEUTROPHILS RELATIVE PERCENT: 94.9 %
OVALOCYTES: ABNORMAL
PDW BLD-RTO: 16.1 % (ref 12.4–15.4)
PERFORMED ON: ABNORMAL
PLATELET # BLD: 129 K/UL (ref 135–450)
PLATELET SLIDE REVIEW: ADEQUATE
PMV BLD AUTO: 9.2 FL (ref 5–10.5)
POTASSIUM REFLEX MAGNESIUM: 3.9 MMOL/L (ref 3.5–5.1)
RBC # BLD: 3.76 M/UL (ref 4–5.2)
SLIDE REVIEW: ABNORMAL
SODIUM BLD-SCNC: 144 MMOL/L (ref 136–145)
WBC # BLD: 25.6 K/UL (ref 4–11)

## 2022-09-26 PROCEDURE — 6370000000 HC RX 637 (ALT 250 FOR IP): Performed by: UROLOGY

## 2022-09-26 PROCEDURE — 85025 COMPLETE CBC W/AUTO DIFF WBC: CPT

## 2022-09-26 PROCEDURE — 83605 ASSAY OF LACTIC ACID: CPT

## 2022-09-26 PROCEDURE — 94640 AIRWAY INHALATION TREATMENT: CPT

## 2022-09-26 PROCEDURE — 6370000000 HC RX 637 (ALT 250 FOR IP): Performed by: INTERNAL MEDICINE

## 2022-09-26 PROCEDURE — 2580000003 HC RX 258

## 2022-09-26 PROCEDURE — 2580000003 HC RX 258: Performed by: INTERNAL MEDICINE

## 2022-09-26 PROCEDURE — 6360000002 HC RX W HCPCS

## 2022-09-26 PROCEDURE — 6370000000 HC RX 637 (ALT 250 FOR IP): Performed by: STUDENT IN AN ORGANIZED HEALTH CARE EDUCATION/TRAINING PROGRAM

## 2022-09-26 PROCEDURE — 2580000003 HC RX 258: Performed by: UROLOGY

## 2022-09-26 PROCEDURE — 36415 COLL VENOUS BLD VENIPUNCTURE: CPT

## 2022-09-26 PROCEDURE — 6360000002 HC RX W HCPCS: Performed by: UROLOGY

## 2022-09-26 PROCEDURE — 1200000000 HC SEMI PRIVATE

## 2022-09-26 PROCEDURE — 94761 N-INVAS EAR/PLS OXIMETRY MLT: CPT

## 2022-09-26 PROCEDURE — 93010 ELECTROCARDIOGRAM REPORT: CPT | Performed by: INTERNAL MEDICINE

## 2022-09-26 PROCEDURE — 80048 BASIC METABOLIC PNL TOTAL CA: CPT

## 2022-09-26 PROCEDURE — 6370000000 HC RX 637 (ALT 250 FOR IP)

## 2022-09-26 PROCEDURE — 99222 1ST HOSP IP/OBS MODERATE 55: CPT | Performed by: INTERNAL MEDICINE

## 2022-09-26 PROCEDURE — 71045 X-RAY EXAM CHEST 1 VIEW: CPT

## 2022-09-26 RX ORDER — SODIUM CHLORIDE, SODIUM LACTATE, POTASSIUM CHLORIDE, AND CALCIUM CHLORIDE .6; .31; .03; .02 G/100ML; G/100ML; G/100ML; G/100ML
500 INJECTION, SOLUTION INTRAVENOUS ONCE
Status: COMPLETED | OUTPATIENT
Start: 2022-09-26 | End: 2022-09-26

## 2022-09-26 RX ORDER — 0.9 % SODIUM CHLORIDE 0.9 %
1000 INTRAVENOUS SOLUTION INTRAVENOUS ONCE
Status: COMPLETED | OUTPATIENT
Start: 2022-09-26 | End: 2022-09-26

## 2022-09-26 RX ADMIN — SENNOSIDES AND DOCUSATE SODIUM 2 TABLET: 50; 8.6 TABLET ORAL at 08:51

## 2022-09-26 RX ADMIN — MOMETASONE FUROATE AND FORMOTEROL FUMARATE DIHYDRATE 2 PUFF: 100; 5 AEROSOL RESPIRATORY (INHALATION) at 20:10

## 2022-09-26 RX ADMIN — ONDANSETRON 4 MG: 2 INJECTION INTRAMUSCULAR; INTRAVENOUS at 22:31

## 2022-09-26 RX ADMIN — MEROPENEM 1000 MG: 1 INJECTION, POWDER, FOR SOLUTION INTRAVENOUS at 20:02

## 2022-09-26 RX ADMIN — PANTOPRAZOLE SODIUM 40 MG: 40 TABLET, DELAYED RELEASE ORAL at 05:40

## 2022-09-26 RX ADMIN — MOMETASONE FUROATE AND FORMOTEROL FUMARATE DIHYDRATE 2 PUFF: 100; 5 AEROSOL RESPIRATORY (INHALATION) at 09:07

## 2022-09-26 RX ADMIN — MEROPENEM 1000 MG: 1 INJECTION, POWDER, FOR SOLUTION INTRAVENOUS at 09:06

## 2022-09-26 RX ADMIN — ALPRAZOLAM 0.5 MG: 0.5 TABLET ORAL at 18:29

## 2022-09-26 RX ADMIN — ATORVASTATIN CALCIUM 20 MG: 20 TABLET, FILM COATED ORAL at 20:02

## 2022-09-26 RX ADMIN — TRAZODONE HYDROCHLORIDE 150 MG: 100 TABLET ORAL at 20:02

## 2022-09-26 RX ADMIN — ACETAMINOPHEN 650 MG: 650 SUPPOSITORY RECTAL at 18:01

## 2022-09-26 RX ADMIN — SENNOSIDES AND DOCUSATE SODIUM 2 TABLET: 50; 8.6 TABLET ORAL at 20:02

## 2022-09-26 RX ADMIN — SODIUM CHLORIDE, POTASSIUM CHLORIDE, SODIUM LACTATE AND CALCIUM CHLORIDE: 600; 310; 30; 20 INJECTION, SOLUTION INTRAVENOUS at 03:05

## 2022-09-26 RX ADMIN — ENOXAPARIN SODIUM 30 MG: 100 INJECTION SUBCUTANEOUS at 08:51

## 2022-09-26 RX ADMIN — SODIUM CHLORIDE, POTASSIUM CHLORIDE, SODIUM LACTATE AND CALCIUM CHLORIDE 500 ML: 600; 310; 30; 20 INJECTION, SOLUTION INTRAVENOUS at 03:06

## 2022-09-26 RX ADMIN — ALBUTEROL SULFATE 2.5 MG: 2.5 SOLUTION RESPIRATORY (INHALATION) at 22:40

## 2022-09-26 RX ADMIN — SODIUM CHLORIDE, PRESERVATIVE FREE 10 ML: 5 INJECTION INTRAVENOUS at 08:52

## 2022-09-26 RX ADMIN — ASPIRIN 81 MG 81 MG: 81 TABLET ORAL at 08:51

## 2022-09-26 RX ADMIN — TIZANIDINE 4 MG: 4 TABLET ORAL at 08:51

## 2022-09-26 RX ADMIN — SODIUM CHLORIDE 1000 ML: 9 INJECTION, SOLUTION INTRAVENOUS at 09:05

## 2022-09-26 RX ADMIN — ESCITALOPRAM OXALATE 10 MG: 10 TABLET ORAL at 08:51

## 2022-09-26 RX ADMIN — TIZANIDINE 4 MG: 4 TABLET ORAL at 20:02

## 2022-09-26 RX ADMIN — TIZANIDINE 4 MG: 4 TABLET ORAL at 14:30

## 2022-09-26 ASSESSMENT — PAIN SCALES - GENERAL
PAINLEVEL_OUTOF10: 0

## 2022-09-26 ASSESSMENT — PAIN DESCRIPTION - ORIENTATION: ORIENTATION: MID

## 2022-09-26 ASSESSMENT — PAIN DESCRIPTION - LOCATION: LOCATION: HEAD

## 2022-09-26 ASSESSMENT — PAIN DESCRIPTION - DESCRIPTORS: DESCRIPTORS: ACHING

## 2022-09-26 ASSESSMENT — PAIN - FUNCTIONAL ASSESSMENT: PAIN_FUNCTIONAL_ASSESSMENT: ACTIVITIES ARE NOT PREVENTED

## 2022-09-26 NOTE — CONSULTS
Infectious Diseases Inpatient Consult Note    RESIDENT NOTE - reviewed / edited, attending note at bottom    Reason for Consult:   SE coli bacteremia / UTI  Requesting Physician:   Emilie Stone  Primary Care Physician:  Dinora Mayo, APRN-CNP, APRN - NP  History Obtained From:   Pt, EPIC    Admit Date: 9/24/2022  Hospital Day: 3    CHIEF COMPLAINT:    Dysuria   Chief Complaint   Patient presents with    Dysuria       HISTORY OF PRESENT ILLNESS:      Loida Reynoso is a 58 y/o female with a PMHx of HTN, anxiety, internal cartoid artery occlusion and cholecystectomy, presents to the ED complaining of dysuria. She was recently treated for a UTI at the beginning of July and has completed a course of Macrobid. She said the dysuria started two weeks ago and has been progressively getting worse. On Sat 9/24, she had problems dressing herself because her mind became foggy, so she came straight to the ED. She reports headache, confusion, abdominal pain and fullness, fever and constipation. Her last bowel movement was several days ago. Although she feels her stomach rumble, nothing passes through. She denies chest pain, lightheadedness and hematuria. In ED, T 103.1, BP 71/53. WBC 20.4 , UA large LE   Vitals:   BP   /65-90   Temp   103.1   Pulse   125   Resp   16-18   SpO2    94-97     Labs: WBC=20.4; Lactic acid 2.5; Creatinine of 2.0; Iv=198; U/a + for nitrites, LE, bacteria    Imaging: CT shows mild R hydronephrosis and mild/moderate left hydronephrosis. No identifiable stones. Suggestion of pyelonephritis and cystitis given enhancement around renal collecting systems. CT also shows fluid-filled loops of small bowel.     Interventions in the ED: Sepsis bolus; Vancomycin and Cefepime       Past Medical History:    Past Medical History:   Diagnosis Date    Anxiety     Arthritis     Hepatitis C     Hyperlipidemia     Hypertension     Reflux esophagitis        Past Surgical History:    Past Surgical History:   Procedure Laterality Date    BLADDER SURGERY Bilateral 9/25/2022    CYSTOSCOPY RETROGRADE PYELOGRAM STENT INSERTION BILATERAL performed by Kim Rosales MD at [address] Left 7/20/2020    LEFT CAROTID ENDARTERECTOMY WITH INTRAOPERATIVE DUPLEX SCAN performed by Delphine Tillman MD at [address]      left arm    JOINT REPLACEMENT      knee       Current Medications:     mometasone-formoterol  2 puff Inhalation BID    sennosides-docusate sodium  2 tablet Oral BID    meropenem  1,000 mg IntraVENous Q12H    aspirin  81 mg Oral Daily    escitalopram  10 mg Oral Daily    tiZANidine  4 mg Oral TID    traZODone  150 mg Oral Nightly    sodium chloride flush  5-40 mL IntraVENous 2 times per day    enoxaparin  30 mg SubCUTAneous Daily    pantoprazole  40 mg Oral QAM AC    nicotine  1 patch TransDERmal Daily    atorvastatin  20 mg Oral Nightly       Allergies:  Penicillins, Hydrochlorothiazide, Nut  [macadamia nut oil], Peanut (diagnostic), and Peanut-containing drug products    Social History:    TOBACCO:    Yes/23PPY  ETOH:    Yes   DRUGS:   No   MARITAL STATUS:      OCCUPATION:       Patient lives     Family History:   No immunodeficiency    REVIEW OF SYSTEMS:    No fever / yes for chills / sweats/ No weight loss. No visual change, eye pain, eye discharge. No oral lesion, sore throat, dysphagia. Denies cough / sputum. Denies chest pain, palpitations. Denies n / v / abd pain. One episode of diarrhea like bowel movement after enema  Mild dysuria   Denies joint swelling or pain. No myalgia, arthralgia.   Denies skin changes, itching  Denies focal weakness, but some headaches and dizziness  Denies new / worse depression, psychiatric symptoms    PHYSICAL EXAM:      Vitals:    BP (!) 92/56   Pulse 93   Temp 98.5 °F (36.9 °C) (Oral)   Resp 18   Ht 5' 4\" (1.626 m)   Wt 140 lb 3.4 oz (63.6 kg)   SpO2 95%   BMI 24.07 kg/m²     GENERAL: No apparent distress. RA  HEENT: Membranes moist, no oral lesion  NECK:  Supple, no lymphadenopathy  LUNGS: Clear b/l, no rales, no dullness  CARDIAC: RRR, no murmur appreciated   ABD:  + BS, soft / NT - no CVAT  EXT:  No rash, no edema, no lesions  NEURO: No focal neurologic findings  PSYCH: Orientation, sensorium, mood normal  LINES:  Peripheral iv    DATA:    Lab Results   Component Value Date    WBC 25.6 (H) 09/26/2022    HGB 11.4 (L) 09/26/2022    HCT 33.6 (L) 09/26/2022    MCV 89.4 09/26/2022     (L) 09/26/2022     Lab Results   Component Value Date    CREATININE 1.7 (H) 09/26/2022    BUN 29 (H) 09/26/2022     09/26/2022    K 3.9 09/26/2022     09/26/2022    CO2 22 09/26/2022       Hepatic Function Panel:   Lab Results   Component Value Date/Time    ALKPHOS 77 09/24/2022 03:17 PM    ALT 13 09/24/2022 03:17 PM    AST 26 09/24/2022 03:17 PM    PROT 6.8 09/24/2022 03:17 PM    BILITOT 0.4 09/24/2022 03:17 PM    LABALBU 3.8 09/24/2022 03:17 PM       Micro:  (9/24)Blood culture positive for E. coli  (9/24)UA positive for large leukocyte esterase, nitrites,  WBC; 3+ bacteria    Imaging:   CTAP (9/24): Mild right hydronephrosis and mild to moderate left hydronephrosis and hydroureter greater on the left with bladder wall thickening and without definitive obstructing stones.  Correlate for cystitis and/or pyelonephritis with some enhancement around the    renal collecting systems suggesting infection, without obstructing stones identified           IMPRESSION:      Patient Active Problem List   Diagnosis    Hyponatremia    Hypokalemia    Internal carotid artery occlusion, left    Leukopenia    Hepatitis C    HTN (hypertension)    Severe sepsis with acute organ dysfunction (HCC)    Acute pyelonephritis    CALI (acute kidney injury) ([address]    Lactic acidosis    Hyperglycemia    Gastroesophageal reflux disease without esophagitis    Hyperlipidemia       Assessment and Plan  Severe Sepsis 2/2 Pyelonephritis  Patient presented tachycardic, febrile with an elevated WBC of 20.1 and a positive U/A as the likely source. Given that she has evidence of fat stranding and kidney inflammation on CT, etiology is likely pyelo. Received sepsis bolus of 3L in ED along with vancomycin and cefepime. - Urine Culture - not sent   - Blood Cultures positive for E. Coli      RECOMMENDATIONS:    Cont Merrem (9/25 - )    Discussed with Dr. Shasta Da Silva MD PGY-1     Addendum to Resident Consult note:  Pt seen, examined and evaluated. I have reviewed the current history, physical findings, labs and assessment and plan and agree with note as documented by resident (Dr. Jacqueline Moreas). 58 yo woman with hx HTN, ICA dissection ,anxiety  Hx UTIs, no hx stones, cyst, urologic surg    Presents dysuria, worsening over 2 weeks  Developed confusion and presented to ED    In ED 9/24, T 103.1, WBC 20.4.  UA large LE  Admit, started on Meropenem    BC + E coli    IMP/  E coli UTI / bacteremia  Fever - resolved   Leukocytosis - WBC up today, 25.6    REC/  Cont meropenem for now  Await E coli ID / sensitivities and will modify antibiotic based on results     Medical Decision Making: The following items were considered in medical decision making:  Discussion of patient care with other providers  Review / order clinical lab tests  Review / order radiology tests  Review / order other diagnostic tests/interventions  Independent review of radiologic images  Microbiology cultures and other micro tests reviewed      Risk of Complications/Morbidity: High    Illness(es)/ Infection present that pose threat to bodily function. There is potential for severe exacerbation of infection/side effects of treatment.   Therapy requires intensive monitoring for antimicrobial agent toxicity    Discussed with pt, RN  Magaly Luna MD

## 2022-09-26 NOTE — PROGRESS NOTES
[facility] NEPHROLOGY    [facility]. [facility]              [phone]                       Plan :     Blood pressure is relatively low. Urine output is 2.2 L.    urine sodium is less than 20 with a creatinine of 56. Patient received multiple fluid boluses. Status post cystoscopy and bilateral stent placement for bilateral hydronephrosis. Creatinine slightly improving 2.2>> 1.7  Continue IV fluids. Keep mean arterial pressure greater than 65. Discontinue amlodipine, lisinopril, milk of magnesia, metoprolol. Continue antibiotics. I Expect kidney function to improve with hydration and resolution of hydronephrosis. Assessment :     Acute kidney injury:  Baseline creatinine around 1. Creatinine at the time of consultation is 2.2. Etiology of acute kidney injury is likely related to severe hemodynamic insult in the setting of sepsis relative hypotension and potentially bilateral obstructive issues as well given the hydronephrosis on the scan although no stones are seen on the scan    Hypotension:  Goal mean arterial pressure greater than 65    Acidosis:  Anion gap metabolic acidosis in the setting of lactic acidosis and acute kidney injury rule out ketosis         [facility] would like to thank Elly Mark MD   for opportunity to serve this patient      Please call with questions at-   24 Hrs Answering service [phone] or  7 am- 5 pm via Perfect serve or cell phone  Princess Porras MD          CC/reason for consult :     Acute kidney injury     HPI :     Adrian Issa is a 59 y.o. female with past medical history significant for hypertension, anxiety, carotid artery occlusion in the past along with history of cholecystectomy presented to emergency room with significant dysuria and flank pain. Onset of the patient's symptoms are subtle. Started about 2 weeks back progressively got worse associated with significant flank discomfort and dysuria.   Nothing was making the symptoms better or worse. She was reporting significant headache and fever along with all this. Significant constipation. Patient was recently diagnosed with urinary tract infection was treated with antibiotic for that as well. He was admitted to the intensive care unit for sepsis and acute kidney injury. Given the patient's acute kidney injury nephrology consulted to assist with care     Interval History:     -Feels weak    ROS:     Seen with-no family in the room    positives in bold   Constitutional:  fever, chills, weakness, weight change, fatigue  Skin:  rash, pruritus, hair loss, bruising, dry skin, petechiae  Head, Face, Neck   headaches, swelling,  cervical adenopathy  Respiratory: shortness of breath, cough, or wheezing  Cardiovascular: chest pain, palpitations, dizzy, edema  Gastrointestinal: nausea, vomiting, diarrhea, constipation,belly pain    Yellow skin, blood in stool  Musculoskeletal:  back pain, muscle weakness, gait problems,       joint pain or swelling. Genitourinary:  dysuria, poor urine flow, flank pain, blood in urine  Neurologic:  vertigo, TIA'S, syncope, seizures, focal weakness  Psychosocial:  insomnia, anxiety, or depression. Additional positive findings:                          All other remaining systems are negative. PMH/PSH/SH/Family History:     Past Medical History:   Diagnosis Date    Anxiety     Arthritis     Hepatitis C     Hyperlipidemia     Hypertension     Reflux esophagitis        Past Surgical History:   Procedure Laterality Date    CAROTID ENDARTERECTOMY Left 7/20/2020    LEFT CAROTID ENDARTERECTOMY WITH INTRAOPERATIVE DUPLEX SCAN performed by Neeraj Wilson MD at [address]      left arm    JOINT REPLACEMENT      knee       Social History     Socioeconomic History    Marital status:       Spouse name: Not on file    Number of children: Not on file    Years of education: Not on file    Highest education level: Not on file Occupational History    Not on file   Tobacco Use    Smoking status: Every Day     Packs/day: 1.00     Years: 23.00     Pack years: 23.00     Types: Cigarettes    Smokeless tobacco: Never   Vaping Use    Vaping Use: Never used   Substance and Sexual Activity    Alcohol use: Yes     Comment: soc    Drug use: Never    Sexual activity: Not on file   Other Topics Concern    Not on file   Social History Narrative    Not on file     Social Determinants of Health     Financial Resource Strain: Not on file   Food Insecurity: Not on file   Transportation Needs: Not on file   Physical Activity: Not on file   Stress: Not on file   Social Connections: Not on file   Intimate Partner Violence: Not on file   Housing Stability: Not on file       History reviewed. No pertinent family history.        Medication:      mometasone-formoterol  2 puff Inhalation BID    sennosides-docusate sodium  2 tablet Oral BID    meropenem  1,000 mg IntraVENous Q12H    aspirin  81 mg Oral Daily    escitalopram  10 mg Oral Daily    tiZANidine  4 mg Oral TID    traZODone  150 mg Oral Nightly    sodium chloride flush  5-40 mL IntraVENous 2 times per day    enoxaparin  30 mg SubCUTAneous Daily    [Held by provider] amLODIPine  10 mg Oral Daily    [Held by provider] lisinopril  20 mg Oral Daily    [Held by provider] metoprolol succinate  100 mg Oral Daily    pantoprazole  40 mg Oral QAM AC    nicotine  1 patch TransDERmal Daily    atorvastatin  20 mg Oral Nightly     ipratropium-albuterol, acetaminophen, sodium chloride flush, sodium chloride, ondansetron **OR** ondansetron, polyethylene glycol, acetaminophen **OR** acetaminophen, albuterol, benzonatate, ALPRAZolam       Vitals :     /68   Pulse 84   Temp 98.2 °F (36.8 °C) (Oral)   Resp 18   Ht 5' 4\" (1.626 m)   Wt 140 lb 3.4 oz (63.6 kg)   SpO2 93%   BMI 24.07 kg/m²       I & O :       Intake/Output Summary (Last 24 hours) at 9/26/2022 1382  Last data filed at 9/26/2022 0410  Gross per 24

## 2022-09-26 NOTE — PROGRESS NOTES
Pt states has a severe headache. 650 mg of tylenol was given. Will recheck in 30 minutes. HR elevated to sinus tachy 108-110. Pt states she is feeling worse. Perfect serve MD.  No  new orders at this time.   Electronically signed by Julianne Mccord RN on 9/26/2022 at 6:26 PM

## 2022-09-26 NOTE — PLAN OF CARE
Problem: Infection - Adult  Goal: Absence of infection at discharge  Outcome: Progressing  Flowsheets (Taken 9/26/2022 1022)  Absence of infection at discharge:   Assess and monitor for signs and symptoms of infection   Monitor lab/diagnostic results   Monitor all insertion sites i.e., indwelling lines, tubes and drains   Instruct and encourage patient and family to use good hand hygiene technique   Administer medications as ordered       Problem: ABCDS Injury Assessment  Goal: Absence of physical injury  Outcome: Progressing  Flowsheets (Taken 9/25/2022 1954 by Sherlie Bamberger, RN)  Absence of Physical Injury: Implement safety measures based on patient assessment     Problem: Safety - Adult  Goal: Free from fall injury  Flowsheets (Taken 9/25/2022 0831 by Anival Lyons RN)  Free From Fall Injury:   Instruct family/caregiver on patient safety   Based on caregiver fall risk screen, instruct family/caregiver to ask for assistance with transferring infant if caregiver noted to have fall risk factors

## 2022-09-26 NOTE — OP NOTE
[facility] [address], [address]                                OPERATIVE REPORT    PATIENT NAME: Naina Lawson                  :        1960  MED REC NO:   [id number]                          ROOM:       4302  ACCOUNT NO:   [de-identified]                           ADMIT DATE: 2022  PROVIDER:     Akanksha Shell MD    DATE OF PROCEDURE:  2022    PREOPERATIVE DIAGNOSES:  Urosepsis, bilateral hydronephrosis. POSTOPERATIVE DIAGNOSES:  Urosepsis, bilateral hydronephrosis. PROCEDURES:  Cystourethroscopy, bilateral retrograde pyelograms,  insertion bilateral ureteral stents (6-Luxembourgish x 26 cm double-J) under  fluoroscopic guidance. SURGEON:  Akanksha Shell MD    ANESTHESIA:  General.    COMPLICATIONS:  None. INDICATIONS:  This patient is a 75-year-old female admitted with a fever  of 103 along with bilateral flank pain. Urinalysis is consistent with  urinary tract infection. Blood cultures have been positive for E. coli. She has been placed on broad-spectrum intravenous antibiotics. She was  evaluated with a CT abdomen and pelvis, which showed changes consistent  with pyelonephritis. However, bilateral hydronephrosis was also noted  greater on the left compared to the right. There was no obstructing  ureteral stone seen bilaterally. Further options were discussed with  the patient regarding management of the hydronephrosis including  observation versus proceeding with cystoscopy and bilateral ureteral  stents insertion. She has elected to proceed with cystoscopy and stent  insertion. The risks, benefits and alternatives of the procedure  explained to the patient. Informed consent was obtained prior to  procedure. DESCRIPTION OF OPERATION:  After informed consent was obtained, the  patient was taken to the operating room, placed on the operating table  in the supine position.   General anesthesia was induced. She was  repositioned in a modified dorsal lithotomy position and the external  genitalia were prepped and draped in the usual sterile manner. 21-Cameroonian cystoscope with 30-degree lens was passed per urethra into the  patient's urinary bladder. Urethra was unremarkable. The bladder was  systematically inspected with both 30-degree and 70-degree lenses. No  tumors or stones were identified. There were some changes consistent  with acute cystitis. Both ureteral orifices were identified and were  normal in position and configuration. Next, a left retrograde pyelogram  was performed with an open-ended Pollack ureteral catheter which did  demonstrate some mild-to-moderate left hydronephrosis. There was no  definitive point of obstruction. A 0.035 ZIPwire was then passed up the  left ureter in the collecting system of the left kidney and then a  6-Cameroonian x 26 cm double-J ureteral stent was advanced over the ZIPwire  under fluoroscopic guidance. Once the stent was well positioned within  the left collecting system, ZIPwire and string on the stent were  removed. Good curl of the stent was noted from the bladder lumen. I  then performed a right-sided retrograde pyelogram, again an open-ended  catheter; this time also demonstrating some hydronephrosis but  relatively mild and again no clear point of obstruction. A 0.035  ZIPwire was then passed up the right ureter in the collecting system of  the right kidney under fluoroscopic guidance. A 6-Cameroonian x 26 cm  double-J ureteral stent was advanced over the ZIPwire under fluoroscopic  guidance. Once the stent was well positioned within the right kidney,  ZIPwire and string on the stent were removed. Good curl of the stent  was noted from the bladder lumen. The bladder was drained. Cystoscope  was withdrawn. The patient was taken to recovery area in a stable  condition having tolerated the procedure well.   Estimated blood loss was  minimal.    Plan is that we will leave the stents in for approximately two to three  weeks, and the patient will be scheduled for cystoscopy with bilateral  stent removal in approximately two to three weeks.         Kamran Jenkins MD    D: 09/25/2022 17:24:57       T: 09/25/2022 17:29:27     [email or web address]  Job#: [id number]     Doc#: [id number]    CC:

## 2022-09-26 NOTE — PROGRESS NOTES
acute distress. Appearance: Normal appearance. She is not ill-appearing. HENT:      Head: Normocephalic and atraumatic. Mouth/Throat:      Mouth: Mucous membranes are moist  Eyes:      Pupils: Pupils are equal, round, and reactive to light. Cardiovascular:      Rate and Rhythm: Regular rhythm. Regular Rate. Pulmonary:      Effort: Pulmonary effort is normal. No respiratory distress. Breath sounds: Wheezing presen - improving  Abdominal:      General: Bowel sounds are normal. There is distension. Palpations: Abdomen is soft. Tenderness: There is abdominal tenderness (TTP to b/l flank, RUQ, LUQ) - Improving  Musculoskeletal:      Right lower leg: No edema. Left lower leg: No edema. Skin:     General: Skin is warm and dry. Neurological:      General: No focal deficit present. Mental Status: She is alert and oriented to person, place, and time. Psychiatric:         Mood and Affect: Mood normal.         Behavior: Behavior normal.     LABS:    CBC:   Recent Labs     09/24/22  1517 09/25/22  0833 09/26/22  0316   WBC 20.4* 19.3* 25.6*   HGB 13.5 11.3* 11.4*   HCT 39.3 34.2* 33.6*    127* 129*   MCV 88.2 89.9 89.4     Renal:    Recent Labs     09/24/22  1517 09/25/22  0833 09/26/22  0316   * 137 144   K 3.7 4.5 3.9   CL 97* 105 110   CO2 20* 18* 22   BUN 22* 27* 29*   CREATININE 2.0* 2.2* 1.7*   GLUCOSE 109* 78 124*   CALCIUM 8.9 7.7* 7.9*   ANIONGAP 15 14 12     Hepatic:   Recent Labs     09/24/22  1517   AST 26   ALT 13   BILITOT 0.4   PROT 6.8   LABALBU 3.8   ALKPHOS 77     Troponin: No results for input(s): TROPONINI in the last 72 hours. BNP: No results for input(s): BNP in the last 72 hours. Lipids: No results for input(s): CHOL, HDL in the last 72 hours. Invalid input(s): LDLCALCU, TRIGLYCERIDE  ABGs:  No results for input(s): PHART, CED1UHB, PO2ART, IOW2LVA, BEART, THGBART, P6XDENOD, GMN9YRX in the last 72 hours.     INR: No results for input(s): INR in the last 72 hours. Lactate: No results for input(s): LACTATE in the last 72 hours. Cultures:  -----------------------------------------------------------------  RAD:   CT CHEST ABDOMEN PELVIS W CONTRAST   Final Result      CHEST:   No acute bony process or consolidation      ABDOMEN AND PELVIS:      Mild right hydronephrosis and mild to moderate left hydronephrosis and hydroureter greater on the left with bladder wall thickening and without definitive obstructing stones. Correlate for cystitis and/or pyelonephritis with some enhancement around the    renal collecting systems suggesting infection, without obstructing stones identified      Nonspecific fluid-filled loops of small bowel may reflect an ileus. Moderate stool noted throughout the colon without obstructing lesion appreciated. Mild diverticulosis in the sigmoid region. XR CHEST PORTABLE   Final Result      1. No acute disease. Assessment/Plan:   Severe Sepsis 2/2 Pyelonephritis  Patient presented tachycardic, febrile with an elevated WBC of 20.1 and a positive U/A as the likely source. Given that she has evidence of fat stranding and kidney inflammation on CT, etiology is likely pyelo. Received sepsis bolus of 3L in ED along with vancocin and cefepime. Lactate uptrending to 5.0 (1.6) this AM, however repeat normalized. - urology consulted - appreciate recs   - B/L ureteral stents placed successfully   - no obstruction/stones observed   - outpatient mgmt of stents per Urology - will f/u  - nephrology consulted - appreciate [address] (9/25 - )  - will cont LR 15cc/hr  - f/u Urine Culture - negative  - f/u Blood Cultures x2 - NGTD  - Lactate rising again after normalizing yesterday - will trend again today - continuing IVF  - ID consulted, appreciate recs. Constipation  Patient has not passed a bowel movement in several days. CT shows fluid filled loops in the small bowel indicative of an ileus.  Positive bowel sounds make ileus unlikely.    - Soap Suds  - Senokot       CALI  Patient has a creatinine of 2.0 on a background of 0.8. Given b/l hydronephrosis and sepsis, likely both intra and post renal. Making good urine 9/25 AM.  - Good UOP and Cr downtrending this AM.  - Urine lytes   Na - less than 20, Cr 56 - prerenal?  - Nephro following, appreciate recs    HTN  BP softer this AM - low 90s  - hold BP meds - takes amlodipine, Lisinopril, Metropolol  - appreciate input from Nephrology. Chronic Problems  Carotid Stenosis s/p stent - Aspirin and Plavix  Anxiety  - Lexipro and Xanax prn   Insomnia - Trazodone     Code Status: FULL  FEN: Regular  PPX: lovenox  DISPO: Brendon Harkins MD, PGY-1  09/26/22  11:38 AM    This patient has been staffed and discussed with Alexis Portillo MD.  Patient seen and examined, labs and imaging studies reviewed, agree with assessment and plan as outlined above. Continue with current care and plan. Discussed case with patients nurse, discussed case with care team, discussed plan.       MD Krissy Zambrano

## 2022-09-26 NOTE — CARE COORDINATION
Case Management Assessment           Initial Evaluation                Date / Time of Evaluation: 9/26/2022 2:49 PM                 Assessment Completed by: Riya Muhammad RN    Patient Name: Jef Rankin     YOB: 1960  Diagnosis: Acute pyelonephritis [N10]  Sepsis associated hypotension ([facility] [address] [address] [A41.9, I95.9]  Hydronephrosis, bilateral [N13.30]  Acute kidney injury ([address] [N17.9]  Sepsis ([facility] [address] [A41.9]  Sepsis without acute organ dysfunction, due to unspecified organism [facility]) [A41.9]     Date / Time: 9/24/2022  1:50 PM    Patient Admission Status: Inpatient    If patient is discharged prior to next notation, then this note serves as note for discharge by case management. Current PCP: Jearl Essex, LAUREANOCNP, ROOSE[place]ELT - NP  Clinic Patient: No    Chart Reviewed: Yes  Patient/ Family Interviewed: Yes    Initial assessment completed at bedside with: patient    Hospitalization in the last 30 days: No    Emergency Contacts:  Extended Emergency Contact Information  Primary Emergency Contact: [address], [address] [place] [address] Phone: [phone]  Relation: Child    Advance Directives:   Code Status: Full [address]: No  Agent:   Contact Number:     Copy present: No     In paper Chart: No    Scanned into EMR No    Financial  Payor: [employer] [address] [place] [address] / Plan: Emil Tavarez / Product Type: *No Product type* /     Pre-cert required for SNF: Yes    Pharmacy    [facility] [id number] [facility] Laurette Cheadle [phone]  [facility] [address]  Phone: [phone] Fax: [phone]      Potential assistance Purchasing Medications: Potential Assistance Purchasing Medications: No  Does Patient want to participate in local refill/ meds to beds program?:      Meds To Beds General Rules:  1. Can ONLY be done Monday- Friday between 8:30am-5pm  2. Prescription(s) must be in pharmacy by 3pm to be filled same day  3. Copy of patient's insurance/ prescription drug card and patient face sheet must be sent along with the prescription(s)  4. Cost of Rx cannot be added to hospital bill. If financial assistance is needed, please contact unit  or ;  or  CANNOT provide pharmacy voucher for patients co-pays  5. Patients can then  the prescription on their way out of the hospital at discharge, or pharmacy can deliver to the bedside if staff is available. (payment due at time of pick-up or delivery - cash, check, or card accepted)     Able to afford home medications/ co-pay costs: Yes    ADLS  Support Systems: Spouse/Significant Other    PT AM-PAC:   /24  OT AM-PAC:   /24    [place]: from home  Steps:     Plans to RETURN to current housing: Yes  Barriers to RETURNING to current housing: none    [place]  Currently ACTIVE with [employer] [address]: No  Home Care Agency: Not Applicable    Currently ACTIVE with [place] on Aging: No  Passport/ Waiver: No  Passport/ Waiver Services: Not Applicable    :  Phone:       [address]  DME Provider: none  Equipment:     Home Oxygen and [address] prior to admission: No  [address]: Not Applicable  Other Respiratory Equipment:       Dialysis  Active with HD/PD prior to admission: No  Nephrologist:     HD Center:  Not Applicable    DISCHARGE PLAN:  Disposition: Home- No Services Needed    Transportation PLAN for discharge: family     Factors facilitating achievement of predicted outcomes: Family support, Cooperative, and Pleasant    Barriers to discharge: Pain and Medication managment    Additional Case Management Notes: Patient from home with boyfriend, independent at baseline, working prior to admission. Patient is not active with Isaura Severino or DME prior to admission, plan for return home at [place], no current needs from .     The Plan for Transition of Care is related to the following treatment goals of Acute pyelonephritis [N10]  Sepsis associated hypotension ([address] [A41.9, I95.9]  Hydronephrosis, bilateral [N13.30]  Acute kidney injury ([address] [N17.9]  Sepsis ([address] [A41.9]  Sepsis without acute organ dysfunction, due to unspecified organism [facility]) [A41.9]    The Patient and/or patient representative Sarthak Grullon and her family were provided with a choice of provider and agrees with the discharge plan Yes    Freedom of choice list was provided with basic dialogue that supports the patient's individualized plan of care/goals and shares the quality data associated with the providers.  Yes    Care Transition patient: No    Shelly Mayo RN  [facility]  Case Management Department  Ph: [phone]   Fax: [phone]

## 2022-09-27 VITALS
OXYGEN SATURATION: 94 % | RESPIRATION RATE: 18 BRPM | HEART RATE: 100 BPM | HEIGHT: 64 IN | BODY MASS INDEX: 26.42 KG/M2 | SYSTOLIC BLOOD PRESSURE: 147 MMHG | WEIGHT: 154.76 LBS | TEMPERATURE: 97.6 F | DIASTOLIC BLOOD PRESSURE: 88 MMHG

## 2022-09-27 LAB
ANION GAP SERPL CALCULATED.3IONS-SCNC: 12 MMOL/L (ref 3–16)
BANDED NEUTROPHILS RELATIVE PERCENT: 10 % (ref 0–7)
BASOPHILS ABSOLUTE: 0 K/UL (ref 0–0.2)
BASOPHILS RELATIVE PERCENT: 0 %
BLOOD CULTURE, ROUTINE: ABNORMAL
BLOOD CULTURE, ROUTINE: ABNORMAL
BUN BLDV-MCNC: 25 MG/DL (ref 7–20)
CALCIUM SERPL-MCNC: 8.7 MG/DL (ref 8.3–10.6)
CHLORIDE BLD-SCNC: 111 MMOL/L (ref 99–110)
CO2: 21 MMOL/L (ref 21–32)
CREAT SERPL-MCNC: 1.4 MG/DL (ref 0.6–1.2)
EOSINOPHILS ABSOLUTE: 0 K/UL (ref 0–0.6)
EOSINOPHILS RELATIVE PERCENT: 0 %
GFR AFRICAN AMERICAN: 46
GFR NON-AFRICAN AMERICAN: 38
GLUCOSE BLD-MCNC: 149 MG/DL (ref 70–99)
HCT VFR BLD CALC: 34.6 % (ref 36–48)
HEMOGLOBIN: 11.5 G/DL (ref 12–16)
LYMPHOCYTES ABSOLUTE: 0.2 K/UL (ref 1–5.1)
LYMPHOCYTES RELATIVE PERCENT: 1 %
MAGNESIUM: 1.6 MG/DL (ref 1.8–2.4)
MCH RBC QN AUTO: 29.7 PG (ref 26–34)
MCHC RBC AUTO-ENTMCNC: 33.2 G/DL (ref 31–36)
MCV RBC AUTO: 89.3 FL (ref 80–100)
METAMYELOCYTES RELATIVE PERCENT: 1 %
MONOCYTES ABSOLUTE: 0.2 K/UL (ref 0–1.3)
MONOCYTES RELATIVE PERCENT: 1 %
NEUTROPHILS ABSOLUTE: 23.7 K/UL (ref 1.7–7.7)
NEUTROPHILS RELATIVE PERCENT: 87 %
ORGANISM: ABNORMAL
ORGANISM: ABNORMAL
PDW BLD-RTO: 16.1 % (ref 12.4–15.4)
PLATELET # BLD: 125 K/UL (ref 135–450)
PMV BLD AUTO: 8.6 FL (ref 5–10.5)
POTASSIUM REFLEX MAGNESIUM: 3.2 MMOL/L (ref 3.5–5.1)
RBC # BLD: 3.87 M/UL (ref 4–5.2)
SODIUM BLD-SCNC: 144 MMOL/L (ref 136–145)
WBC # BLD: 24.2 K/UL (ref 4–11)

## 2022-09-27 PROCEDURE — 6370000000 HC RX 637 (ALT 250 FOR IP): Performed by: INTERNAL MEDICINE

## 2022-09-27 PROCEDURE — 83735 ASSAY OF MAGNESIUM: CPT

## 2022-09-27 PROCEDURE — 99232 SBSQ HOSP IP/OBS MODERATE 35: CPT | Performed by: INTERNAL MEDICINE

## 2022-09-27 PROCEDURE — 2580000003 HC RX 258: Performed by: INTERNAL MEDICINE

## 2022-09-27 PROCEDURE — 97165 OT EVAL LOW COMPLEX 30 MIN: CPT

## 2022-09-27 PROCEDURE — 97535 SELF CARE MNGMENT TRAINING: CPT

## 2022-09-27 PROCEDURE — 6360000002 HC RX W HCPCS: Performed by: UROLOGY

## 2022-09-27 PROCEDURE — 36415 COLL VENOUS BLD VENIPUNCTURE: CPT

## 2022-09-27 PROCEDURE — 97116 GAIT TRAINING THERAPY: CPT

## 2022-09-27 PROCEDURE — 6370000000 HC RX 637 (ALT 250 FOR IP): Performed by: UROLOGY

## 2022-09-27 PROCEDURE — 6360000002 HC RX W HCPCS

## 2022-09-27 PROCEDURE — 80048 BASIC METABOLIC PNL TOTAL CA: CPT

## 2022-09-27 PROCEDURE — 97161 PT EVAL LOW COMPLEX 20 MIN: CPT

## 2022-09-27 PROCEDURE — 85025 COMPLETE CBC W/AUTO DIFF WBC: CPT

## 2022-09-27 RX ORDER — POTASSIUM CHLORIDE 20 MEQ/1
20 TABLET, EXTENDED RELEASE ORAL
Status: DISCONTINUED | OUTPATIENT
Start: 2022-09-27 | End: 2022-09-27 | Stop reason: HOSPADM

## 2022-09-27 RX ORDER — AMLODIPINE BESYLATE 5 MG/1
5 TABLET ORAL DAILY
Status: DISCONTINUED | OUTPATIENT
Start: 2022-09-27 | End: 2022-09-27 | Stop reason: HOSPADM

## 2022-09-27 RX ORDER — GREEN TEA/HOODIA GORDONII 315-12.5MG
1 CAPSULE ORAL 2 TIMES DAILY
Qty: 60 TABLET | Refills: 0 | Status: SHIPPED | OUTPATIENT
Start: 2022-09-27 | End: 2022-10-27

## 2022-09-27 RX ORDER — SODIUM CHLORIDE 9 MG/ML
INJECTION, SOLUTION INTRAVENOUS CONTINUOUS
Status: DISCONTINUED | OUTPATIENT
Start: 2022-09-27 | End: 2022-09-27 | Stop reason: HOSPADM

## 2022-09-27 RX ORDER — LEVOFLOXACIN 500 MG/1
500 TABLET, FILM COATED ORAL DAILY
Qty: 9 TABLET | Refills: 0 | Status: SHIPPED | OUTPATIENT
Start: 2022-09-28 | End: 2022-10-07

## 2022-09-27 RX ORDER — POTASSIUM CHLORIDE 20 MEQ/1
20 TABLET, EXTENDED RELEASE ORAL
Status: DISCONTINUED | OUTPATIENT
Start: 2022-09-27 | End: 2022-09-27

## 2022-09-27 RX ORDER — MAGNESIUM SULFATE IN WATER 40 MG/ML
4000 INJECTION, SOLUTION INTRAVENOUS ONCE
Status: COMPLETED | OUTPATIENT
Start: 2022-09-27 | End: 2022-09-27

## 2022-09-27 RX ORDER — LEVOFLOXACIN 500 MG/1
500 TABLET, FILM COATED ORAL DAILY
Status: DISCONTINUED | OUTPATIENT
Start: 2022-09-27 | End: 2022-09-27 | Stop reason: HOSPADM

## 2022-09-27 RX ADMIN — PANTOPRAZOLE SODIUM 40 MG: 40 TABLET, DELAYED RELEASE ORAL at 04:52

## 2022-09-27 RX ADMIN — ESCITALOPRAM OXALATE 10 MG: 10 TABLET ORAL at 08:36

## 2022-09-27 RX ADMIN — SODIUM CHLORIDE: 9 INJECTION, SOLUTION INTRAVENOUS at 08:53

## 2022-09-27 RX ADMIN — ALPRAZOLAM 0.5 MG: 0.5 TABLET ORAL at 04:52

## 2022-09-27 RX ADMIN — POTASSIUM CHLORIDE 20 MEQ: 1500 TABLET, EXTENDED RELEASE ORAL at 11:10

## 2022-09-27 RX ADMIN — TIZANIDINE 4 MG: 4 TABLET ORAL at 08:37

## 2022-09-27 RX ADMIN — POTASSIUM CHLORIDE 20 MEQ: 1500 TABLET, EXTENDED RELEASE ORAL at 08:36

## 2022-09-27 RX ADMIN — AMLODIPINE BESYLATE 5 MG: 5 TABLET ORAL at 08:36

## 2022-09-27 RX ADMIN — MAGNESIUM SULFATE HEPTAHYDRATE 4000 MG: 40 INJECTION, SOLUTION INTRAVENOUS at 06:39

## 2022-09-27 RX ADMIN — ENOXAPARIN SODIUM 30 MG: 100 INJECTION SUBCUTANEOUS at 08:36

## 2022-09-27 RX ADMIN — ASPIRIN 81 MG 81 MG: 81 TABLET ORAL at 08:36

## 2022-09-27 RX ADMIN — LEVOFLOXACIN 500 MG: 500 TABLET, FILM COATED ORAL at 11:10

## 2022-09-27 RX ADMIN — ONDANSETRON 4 MG: 2 INJECTION INTRAMUSCULAR; INTRAVENOUS at 08:36

## 2022-09-27 ASSESSMENT — PAIN SCALES - GENERAL
PAINLEVEL_OUTOF10: 0

## 2022-09-27 NOTE — PROGRESS NOTES
Urology Attending Progress Note      Subjective: States she feels horrible due to inability to sleep and diarrhea. No urinary issues reported. Vitals:  BP (!) 162/85   Pulse 100   Temp 97.6 °F (36.4 °C) (Oral)   Resp 18   Ht 5' 4\" (1.626 m)   Wt 154 lb 12.2 oz (70.2 kg)   SpO2 94%   BMI 26.57 kg/m²   Temp  Av.3 °F (36.8 °C)  Min: 97.6 °F (36.4 °C)  Max: 99 °F (37.2 °C)    Intake/Output Summary (Last 24 hours) at 2022 0908  Last data filed at 2022 0828  Gross per 24 hour   Intake 2891.18 ml   Output 2000 ml   Net 891.18 ml         Exam: Urine clear    Labs:  WBC:    Lab Results   Component Value Date/Time    WBC 24.2 2022 04:28 AM     Hemoglobin/Hematocrit:    Lab Results   Component Value Date/Time    HGB 11.5 2022 04:28 AM    HCT 34.6 2022 04:28 AM     BMP:    Lab Results   Component Value Date/Time     2022 04:27 AM    K 3.2 2022 04:27 AM     2022 04:27 AM    CO2 21 2022 04:27 AM    BUN 25 2022 04:27 AM    LABALBU 3.8 2022 03:17 PM    CREATININE 1.4 2022 04:27 AM    CALCIUM 8.7 2022 04:27 AM    GFRAA 46 2022 04:27 AM    LABGLOM 38 2022 04:27 AM     PT/INR:    Lab Results   Component Value Date/Time    PROTIME 11.0 2020 05:17 AM    INR 0.95 2020 05:17 AM     PTT:    Lab Results   Component Value Date/Time    APTT 33.7 2020 08:10 AM   [APTT    Urine Culture:  ?? Blood Culture: E. coli    Antibiotic Therapy: Merrem    Imaging:   Impression       CHEST:   No acute bony process or consolidation       ABDOMEN AND PELVIS:       Mild right hydronephrosis and mild to moderate left hydronephrosis and hydroureter greater on the left with bladder wall thickening and without definitive obstructing stones.  Correlate for cystitis and/or pyelonephritis with some enhancement around the    renal collecting systems suggesting infection, without obstructing stones identified       Nonspecific fluid-filled loops of small bowel may reflect an ileus. Moderate stool noted throughout the colon without obstructing lesion appreciated. Mild diverticulosis in the sigmoid region. Impression/Plan: 60 yo F admitted with urosepsis, POD#2 sp cystoscopy with bilateral stent insertion.     -No urinary issues reported. -Bcx showing E.coli. Continue IV abx  -Cr improved, WBC starting to trend downward  -Stents will need to remain in place for ~2 weeks while she recovers from infection  -Call with any questions. No new changes from our standpoint today.     ED [place]

## 2022-09-27 NOTE — CARE COORDINATION
CM following. Pt from home with boyfriend, Independent and working prior to admission. Pt with bilateral ureter stents placed on 9/25/22. Pt on IV abx, ID following. WBC remain elevated. Pt reports not feeling well due to loose stool, poor sleep,and  nausea. Pt remaining Inpt at this time.      Queta Cardenas RN, BSN, [address]  Case Management Department  [phone]

## 2022-09-27 NOTE — PLAN OF CARE
nutritional intake: Identify factors contributing to decreased intake, treat as appropriate     Problem: Metabolic/Fluid and Electrolytes - Adult  Goal: Hemodynamic stability and optimal renal function maintained  Outcome: Progressing  Flowsheets (Taken 9/26/2022 2351)  Hemodynamic stability and optimal renal function maintained: Encourage oral intake as appropriate     Problem: Discharge Planning  Goal: Discharge to home or other facility with appropriate resources  Outcome: Progressing  Flowsheets (Taken 9/26/2022 2351)  Discharge to home or other facility with appropriate resources: Identify barriers to discharge with patient and caregiver     Problem: ABCDS Injury Assessment  Goal: Absence of physical injury  9/26/2022 2351 by Gaston Gary RN  Outcome: Progressing  Flowsheets (Taken 9/26/2022 2351)  Absence of Physical Injury: Implement safety measures based on patient assessment

## 2022-09-27 NOTE — FLOWSHEET NOTE
Pt assessment completed at this time. Pt is very anxious and states she just wants to go to sleep. Night time medications given. POC discussed with pt for night and all questions answered.

## 2022-09-27 NOTE — PROGRESS NOTES
Physical Therapy  Facility/Department: [facility] 4 PCU  Physical Therapy Initial Assessment    Name: Gene Beal  : 1960  MRN: [id number]  Date of Service: 2022    Discharge Recommendations: Gene Beal scored a 21/24 on the AM-PAC short mobility form. At this time, no further PT is recommended upon discharge. Recommend patient returns to prior setting with prior services. PT Equipment Recommendations  Equipment Needed: No      Patient Diagnosis(es): The primary encounter diagnosis was Sepsis without acute organ dysfunction, due to unspecified organism [facility]). Diagnoses of Hydronephrosis, bilateral, Acute pyelonephritis, Sepsis associated hypotension ([facility] [address] and Acute kidney injury [facility]) were also pertinent to this visit. Past Medical History:  has a past medical history of Anxiety, Arthritis, Hepatitis C, Hyperlipidemia, Hypertension, and Reflux esophagitis. Past Surgical History:  has a past surgical history that includes joint replacement; fracture surgery; Carotid endarterectomy (Left, 2020); and Bladder surgery (Bilateral, 2022). Assessment   Assessment: pt is a 58 yo female from home with significant other and IND prior to admission. pt admitted for sepsis, symptoms now starting to improve presenting close to baseline function performing all mobility with supervision to mod I with no AD. pt eager to return home and expresses no concerns for doing so. PT to sign off due to baseline function demonstrated.   Therapy Prognosis: Good  Decision Making: Low Complexity  Requires PT Follow-Up: No  Activity Tolerance  Activity Tolerance: Patient tolerated evaluation without incident     Plan   Plan  Plan: Discharge with evaluation only  Safety Devices  Type of Devices: Gait belt, Call light within reach, Bed alarm in place, Nurse notified, Left in bed     Restrictions  Position Activity Restriction  Other position/activity restrictions: up as tolerated     Subjective General  Chart Reviewed: Yes  Patient assessed for rehabilitation services?: Yes  Additional Pertinent Hx: pt is a 60 yo female presenting with cough, dysuria, lightheadedness, dizziness, dec appetite, and weight loss found to be febrile and tachycardic in ED.  Presentation consistent with sepesis  Referring Practitioner: Leigha Amanda MD  Diagnosis: severe sepsis with acute organ dysfunction  Follows Commands: Within Functional Limits  Subjective  Subjective: pt supine in bed and agreeable to PT         Social/Functional History  Social/Functional History  Lives With: Significant other  Type of Home: House  Home Layout: One level  Home Access: Stairs to enter with rails  Entrance Stairs - Number of Steps: 2-3  Entrance Stairs - Rails: Both  Bathroom Shower/Tub: Tub/Shower unit  Bathroom Toilet: Standard (sink next to toilet)  Bathroom Equipment: Grab bars in shower  Has the patient had two or more falls in the past year or any fall with injury in the past year?: No  Receives Help From: Family  ADL Assistance: [address] [place] [address] Avenue: Independent  Homemaking Responsibilities: Yes  Ambulation Assistance: Independent  Transfer Assistance: Independent  Active : Yes  Occupation: Full time employment  Type of Occupation:  at [employer]  Vision/Hearing  Vision  Vision: Impaired  Vision Exceptions: Wears glasses for reading  Hearing  Hearing: Within functional limits    Cognition   Orientation  Overall Orientation Status: Within Functional Limits  Orientation Level: Oriented X4  Cognition  Overall Cognitive Status: WFL                      Strength RLE  Strength RLE: WFL  Strength LLE  Strength LLE: WFL           Bed mobility  Supine to Sit: Modified independent  Sit to Supine: Modified independent  Scooting: Modified independent  Transfers  Sit to Stand: Supervision  Stand to sit: Supervision  Comment: with no AD from EOB and toilet  Ambulation  Surface: level tile  Device: No Device  Assistance: Supervision  Quality of Gait: steady gait, no LOB  Gait Deviations: Slow Little  Distance: 10' x2 + 250'  More Ambulation?: No  Stairs/Curb  Stairs?: No     Balance  Posture: Good  Sitting - Static: Good  Sitting - Dynamic: Good  Standing - Static: Good  Standing - Dynamic: Fair;+           OutComes Score                                                  AM-PAC Score  AM-PAC Inpatient Mobility Raw Score : 21 (09/27/22 1055)  AM-PAC Inpatient T-Scale Score : 50.25 (09/27/22 1055)  Mobility Inpatient CMS 0-100% Score: 28.97 (09/27/22 1055)  Mobility Inpatient CMS G-Code Modifier : CJ (09/27/22 1055)          Tinneti Score       Goals  Short Term Goals  Time Frame for Short term goals: no acute PT goals to be addressed       Education  Patient Education  Education Given To: Patient  Education Provided: Role of Therapy;Plan of Care  Education Method: Demonstration;Verbal  Barriers to Learning: None  Education Outcome: Verbalized understanding      Therapy Time   Individual Concurrent Group Co-treatment   Time In 0943         Time Out 1008         Minutes 25             Timed Code Treatment Minutes:  10 min     Total Treatment Minutes:  25 min       Danielle Aceves PT

## 2022-09-27 NOTE — DISCHARGE INSTRUCTIONS
Please make appointment to follow-up with your PCP in 1 week. Additionally please make appointment to follow-up with urology (Dr. Randy Lepe) in 2 weeks for management of your stents that were placed during your hospitalization. Also please note you will begin taking an antibiotic (levofloxacin) for a total of 9 days upon discharge. Additionally you will begin taking a probiotic 2 times a day as well. Please stop taking Claritin, Plavix, Flonase and lisinopril. Continue with your other medications as directed please. Of note, all these changes to medications can be found in your medication list.    Note, please come to the ED if your condition worsens.   This includes symptoms such as spiking fevers, worsening back pain, continued nausea and vomiting, etc.

## 2022-09-27 NOTE — PROGRESS NOTES
Occupational Therapy  Facility/Department: [facility] 4 PCU  Occupational Therapy Initial Assessment/ Discharge    Name: Tone Delong  : 1960  MRN: [id number]  Date of Service: 2022    Discharge Recommendations: Tone Delong scored a 22/24 on the AM-PAC ADL Inpatient form. Current research shows that an AM-PAC score of 18 or greater is typically associated with a discharge to the patient's home setting. Based on the patient's AM-PAC score, and their current ADL deficits, it is recommended that the patient have initial 24h supervision. Please see assessment section for further patient specific details. If patient discharges prior to next session this note will serve as a discharge summary. Please see below for the latest assessment towards goals. OT Equipment Recommendations  Equipment Needed: No       Patient Diagnosis(es): The primary encounter diagnosis was Sepsis without acute organ dysfunction, due to unspecified organism ([facility] [address] Diagnoses of Hydronephrosis, bilateral, Acute pyelonephritis, Sepsis associated hypotension ([facility] [address] and Acute kidney injury [facility]) were also pertinent to this visit. Past Medical History:  has a past medical history of Anxiety, Arthritis, Hepatitis C, Hyperlipidemia, Hypertension, and Reflux esophagitis. Past Surgical History:  has a past surgical history that includes joint replacement; fracture surgery; Carotid endarterectomy (Left, 2020); and Bladder surgery (Bilateral, 2022). Assessment   Performance deficits / Impairments: Decreased endurance  Assessment: Pt is 59year old female presenting to [facility] from home with boyfriend. Admitted for severe sepsis with acute orgam dysfunction. Pt typically independent at baseline. Pt is near this functional baseline this date with no concerns about returning home.  Pt performed toileting and LB dressing, functional transfers with supervision, bed mobility with Mod I, and UB dressing with Min A for IV line management. Recommend initial 24 h supervision (pt has from boyfriend). No further acute OT needs, will sign off. Treatment Diagnosis: decreased endurance for functional mobility and ADLs  Prognosis: Good  Decision Making: Low Complexity  REQUIRES OT FOLLOW-UP: No  Activity Tolerance  Activity Tolerance: Patient Tolerated treatment well        Plan   Plan  Times per Week: DC acute OT     Restrictions  Position Activity Restriction  Other position/activity restrictions: up as tolerated    Subjective   General  Chart Reviewed: Yes  Patient assessed for rehabilitation services?: Yes  Additional Pertinent Hx: Pt is a 59 yr old female with PMHx of HTN, anxiety, internal cartoid artery occlusion and cholecystectomy who presents to the ED complaining of dysuria. Severe Sepsis 2/2 Pyelonephritis, CALI, abdominal tenderness (TTP to b/l flank, RUQ, LUQ). Chest XR and CT (-). Pelvis and Abdomen CT- Mild right hydronephrosis and mild to moderate left hydronephrosis and hydroureter greater on the left with bladder wall thickening and without definitive obstructing stones. Correlate for cystitis and/or pyelonephritis with some enhancement around the renal collecting systems suggesting infection, without obstructing stones identified; Nonspecific fluid-filled loops of small bowel may reflect an ileus. Moderate stool noted throughout the colon without obstructing lesion appreciated. Mild diverticulosis in the sigmoid region. Family / Caregiver Present: No  Referring Practitioner: Naren Reese MD  Diagnosis: Severe sepsis with acute organ dysfunction  Subjective  Subjective: Pt met supine in bed, reports bad night of sleep but excited to hopefully discharge today.  Pt agreeable to OT eval.     Social/Functional History  Social/Functional History  Lives With: Significant other  Type of Home: House  Home Layout: One level  Home Access: Stairs to enter with rails  Entrance Stairs - Number of Steps: 2-3  Entrance Stairs - Rails: Both  Bathroom Shower/Tub: Tub/Shower unit  Bathroom Toilet: Standard (sink next to toilet)  Bathroom Equipment: Grab bars in shower  Has the patient had two or more falls in the past year or any fall with injury in the past year?: No  Receives Help From: Family  ADL Assistance: [address]: Independent  Homemaking Responsibilities: Yes  Ambulation Assistance: Independent  Transfer Assistance: Independent  Active : Yes  Occupation: Full time employment  Type of Occupation:  at [employer], recently decreased to 2-3 days/week- pt reports she can have as much time off as needed  Additional Comments: S/O will be home, can provide 24h supervision       Objective   Heart Rate: 113  Heart Rate Source: Monitor  O2 Device: None (Room air)          Observation/Palpation  Observation: 2 IVs in L arm  Safety Devices  Type of Devices: Gait belt;Call light within reach; Bed alarm in place;Nurse notified; Left in bed  Restraints  Restraints Initially in Place: No  Balance  Sitting: Intact (seated EOB and commode with supervision for ~10 min throughout session)  Standing: Intact  Gait  Distance (ft):  (Pt ambulated household distance with supervision around room - no AD)  Toilet Transfers  Toilet - Technique: Ambulating  Equipment Used: Standard toilet  Toilet Transfer: Supervision  AROM: Within functional limits  Strength: Within functional limits  Coordination: Within functional limits  Tone: Normal  Sensation: Intact  ADL  Grooming: Independent;Setup to use mouthwash and wash hands  UE Dressing: Minimal assistance;Setup  UE Dressing Skilled Clinical Factors: Assist to fasten gown over IV lines  LE Dressing: Supervision;Setup  LE Dressing Skilled Clinical Factors: doff/don socks while seated EOB and new brief seated on commode  Toileting: Supervision;Setup  Toileting Skilled Clinical Factors: doffed soiled briefs, donned new briefs seated on commode.  Pt performed front and rear cristóbal-care Activity Tolerance  Activity Tolerance: Patient tolerated evaluation without incident  Bed mobility  Supine to Sit: Modified independent  Sit to Supine: Modified independent  Scooting: Modified independent  HOB slightly elevated  Transfers  Sit to stand: Supervision  Stand to sit: Supervision  Vision  Vision: Impaired  Vision Exceptions: Wears glasses for reading  Hearing  Hearing: Within functional limits  Cognition  Overall Cognitive Status: WFL  Orientation  Overall Orientation Status: Within Functional Limits  Orientation Level: Oriented X4                  Education Given To: Patient  Education Provided: Role of Therapy;Plan of Care;Transfer Training  Education Method: Verbal  Barriers to Learning: None  Education Outcome: Verbalized understanding;Demonstrated understanding                        G-Code     OutComes Score                                                  AM-PAC Score        AM-PAC Inpatient Daily Activity Raw Score: 22 (09/27/22 1140)  AM-PAC Inpatient ADL T-Scale Score : 47.1 (09/27/22 1140)  ADL Inpatient CMS 0-100% Score: 25.8 (09/27/22 1140)  ADL Inpatient CMS G-Code Modifier : CJ (09/27/22 1140)    Tinneti Score       Therapy Time   Individual Concurrent Group Co-treatment   Time In 0943         Time Out 1008         Minutes 25         Timed Code Treatment Minutes: 10 Minutes (+ 15 min OT eval)       Jacqui Dickerson, S/OT    Therapist was present, directed the patient's care, made skilled judgement, and was responsible for assessment and treatment of the patient.      STACEY Singh/L

## 2022-09-27 NOTE — PROGRESS NOTES
ID Follow-up NOTE    CC:   E coli UTI / bacteremia  Antibiotics: Meropenem    Admit Date: 2022  Hospital Day: 4    Subjective:     Patient feels good   C/o loose stool over last night (not watery)  No dysuria, back pain  Eating       Objective:     Patient Vitals for the past 8 hrs:   BP Temp Temp src Pulse Resp SpO2 Weight   22 0447 (!) 162/85 97.6 °F (36.4 °C) Oral 100 18 94 % 154 lb 12.2 oz (70.2 kg)     I/O last 3 completed shifts: In: 5376.9 [P.O.:1130; I.V.:1864.4; IV Piggyback:2382.6]  Out:  [Urine:]  I/O this shift:  In: -   Out: 650 [Urine:650]    EXAM:  GENERAL: No apparent distress. HEENT: Membranes moist, no oral lesion  NECK:  Supple, no lymphadenopathy  LUNGS: Clear b/l, no rales, no dullness  CARDIAC: RRR, no murmur appreciated  ABD:  + BS, soft / NT - no CVAT  EXT:  No rash, no edema, no lesions  NEURO: No focal neurologic findings  PSYCH: Orientation, sensorium, mood normal  LINES:  Peripheral iv       Data Review:  Lab Results   Component Value Date    WBC 24.2 (H) 2022    HGB 11.5 (L) 2022    HCT 34.6 (L) 2022    MCV 89.3 2022     (L) 2022     Lab Results   Component Value Date    CREATININE 1.4 (H) 2022    BUN 25 (H) 2022     2022    K 3.2 (L) 2022     (H) 2022    CO2 21 2022       Hepatic Function Panel:   Lab Results   Component Value Date/Time    ALKPHOS 77 2022 03:17 PM    ALT 13 2022 03:17 PM    AST 26 2022 03:17 PM    PROT 6.8 2022 03:17 PM    BILITOT 0.4 2022 03:17 PM    LABALBU 3.8 2022 03:17 PM       Micro:   BC + E. coli   Urine cult not done      Imagin/24 CT AP  Mild right hydronephrosis and mild to moderate left hydronephrosis and hydroureter greater on the left with bladder wall thickening and without definitive obstructing stones.  Correlate for cystitis and/or pyelonephritis with some enhancement around the    renal collecting systems suggesting infection, without obstructing stones identified       Scheduled Meds:   amLODIPine  5 mg Oral Daily    potassium chloride  20 mEq Oral TID WC    levoFLOXacin  500 mg Oral Daily    mometasone-formoterol  2 puff Inhalation BID    sennosides-docusate sodium  2 tablet Oral BID    aspirin  81 mg Oral Daily    escitalopram  10 mg Oral Daily    tiZANidine  4 mg Oral TID    traZODone  150 mg Oral Nightly    sodium chloride flush  5-40 mL IntraVENous 2 times per day    enoxaparin  30 mg SubCUTAneous Daily    pantoprazole  40 mg Oral QAM AC    nicotine  1 patch TransDERmal Daily    atorvastatin  20 mg Oral Nightly       Continuous Infusions:   sodium chloride 75 mL/hr at 09/27/22 0853    sodium chloride         PRN Meds:  ipratropium-albuterol, acetaminophen, sodium chloride flush, sodium chloride, ondansetron **OR** ondansetron, polyethylene glycol, acetaminophen **OR** acetaminophen, albuterol, benzonatate, ALPRAZolam      Assessment:     60 yo woman with hx HTN, ICA dissection ,anxiety  Hx UTIs, no hx stones, cyst, urologic surg     Presents dysuria, worsening over 2 weeks  Developed confusion and presented to ED     In ED 9/24, T 103.1, WBC 20.4.  UA large LE  Admit, started on Meropenem     BC + E coli     IMP/  E coli UTI / bacteremia  Lactic acidosis - resolved   Fever - resolved   Leukocytosis - WBC 24.2 today  CALI - Cr down, 1.4 today      Plan:     Start Levofloxacin  D/c Meropenem    Treat with Levofloxacin for additional 10 days     Medical Decision Making:   The following items were considered in medical decision making:  Discussion of patient care with other providers  Reviewed clinical lab tests  Reviewed radiology tests  Reviewed other diagnostic tests/interventions  Independent review of radiologic images - reviewed abd / pelvic CT 9/26  Microbiology cultures and other micro tests reviewed      Discussed with shlomo De La Rosa MD

## 2022-09-27 NOTE — FLOWSHEET NOTE
Pt inc large soft bm, assisted with cristóbal care, linens gown diaper changed.  Pt states nausea is improved and just wants to sleep

## 2022-09-27 NOTE — PROGRESS NOTES
[facility] NEPHROLOGY    [facility]              [phone]                       Plan :     Stable BP   Urine is 1350 ml     Status post cystoscopy and bilateral stent placement for bilateral hydronephrosis. Creatinine is improving 2.2>> 1.7>>1.4  Continue IV fluids. Replace KCL and magnesium  Start amlodipine QD  Continue antibiotics. I Expect kidney function to improve with hydration and resolution of hydronephrosis. Assessment :     Acute kidney injury:  Baseline creatinine around 1. Creatinine at the time of consultation is 2.2. Etiology of acute kidney injury is likely related to severe hemodynamic insult in the setting of sepsis relative hypotension and potentially bilateral obstructive issues as well given the hydronephrosis on the scan although no stones are seen on the scan    Hypotension:  Goal mean arterial pressure greater than 65    Acidosis:  Anion gap metabolic acidosis in the setting of lactic acidosis and acute kidney injury rule out ketosis         [facility]ph[facility] would like to thank Avinash Samuels MD   for opportunity to serve this patient      Please call with questions at-   24 Hrs Answering service [phone] or  7 am- 5 pm via Perfect serve or cell phone  Kavya Rivas MD          CC/reason for consult :     Acute kidney injury     HPI :     Alonzo Henriquez is a 59 y.o. female with past medical history significant for hypertension, anxiety, carotid artery occlusion in the past along with history of cholecystectomy presented to emergency room with significant dysuria and flank pain. Onset of the patient's symptoms are subtle. Started about 2 weeks back progressively got worse associated with significant flank discomfort and dysuria. Nothing was making the symptoms better or worse. She was reporting significant headache and fever along with all this. Significant constipation.   Patient was recently diagnosed with urinary tract infection was Smoking status: Every Day     Packs/day: 1.00     Years: 23.00     Pack years: 23.00     Types: Cigarettes    Smokeless tobacco: Never   Vaping Use    Vaping Use: Never used   Substance and Sexual Activity    Alcohol use: Yes     Comment: soc    Drug use: Never    Sexual activity: Not on file   Other Topics Concern    Not on file   Social History Narrative    Not on file     Social Determinants of Health     Financial Resource Strain: Not on file   Food Insecurity: Not on file   Transportation Needs: Not on file   Physical Activity: Not on file   Stress: Not on file   Social Connections: Not on file   Intimate Partner Violence: Not on file   Housing Stability: Not on file       History reviewed. No pertinent family history.        Medication:      magnesium sulfate  4,000 mg IntraVENous Once    potassium chloride  20 mEq Oral TID WC    mometasone-formoterol  2 puff Inhalation BID    sennosides-docusate sodium  2 tablet Oral BID    meropenem  1,000 mg IntraVENous Q12H    aspirin  81 mg Oral Daily    escitalopram  10 mg Oral Daily    tiZANidine  4 mg Oral TID    traZODone  150 mg Oral Nightly    sodium chloride flush  5-40 mL IntraVENous 2 times per day    enoxaparin  30 mg SubCUTAneous Daily    pantoprazole  40 mg Oral QAM AC    nicotine  1 patch TransDERmal Daily    atorvastatin  20 mg Oral Nightly     ipratropium-albuterol, acetaminophen, sodium chloride flush, sodium chloride, ondansetron **OR** ondansetron, polyethylene glycol, acetaminophen **OR** acetaminophen, albuterol, benzonatate, ALPRAZolam       Vitals :     BP (!) 162/85   Pulse 100   Temp 97.6 °F (36.4 °C) (Oral)   Resp 18   Ht 5' 4\" (1.626 m)   Wt 154 lb 12.2 oz (70.2 kg)   SpO2 94%   BMI 26.57 kg/m²       I & O :       Intake/Output Summary (Last 24 hours) at 9/27/2022 0720  Last data filed at 9/27/2022 0246  Gross per 24 hour   Intake 2891.18 ml   Output 1350 ml   Net 1541.18 ml          Physical Examination :     General appearance: Awake pleasant laying in the bed  HEENT: Lips- normal, teeth- ok , oral mucosa- moist  Neck : Mass- no, appears symmetrical, JVD- not visible  Respiratory: Respiratory effort-none, wheeze- no, basilar rales  Cardiovascular:  [id number], Edema positive  Abdomen: visible mass- no, distention- no, scar- no, tenderness- no                            hepatosplenomegaly-  no  Musculoskeletal:  clubbing no,cyanosis- no , digital ischemia- no                           muscle strength- grossly normal , tone - grossly normal  Skin: rashes- no , ulcers- no, induration- no, tightening - no  Psychiatric: Mood stable cooperative clinician   LABS:     Recent Labs     09/25/22 0833 09/26/22 0316 09/27/22  0428   WBC 19.3* 25.6* 24.2*   HGB 11.3* 11.4* 11.5*   HCT 34.2* 33.6* 34.6*   * 129* 125*       Recent Labs     09/25/22 0833 09/26/22 0316 09/27/22  0427    144 144   K 4.5 3.9 3.2*    110 111*   CO2 18* 22 21   BUN 27* 29* 25*   CREATININE 2.2* 1.7* 1.4*   GLUCOSE 78 124* 149*   MG  --   --  1.60* negative

## 2022-09-27 NOTE — DISCHARGE SUMMARY
INTERNAL MEDICINE DEPARTMENT AT [address] [place] [address]  DISCHARGE SUMMARY    Patient ID: Yosef Watkins                                             Discharge Date: 9/27/2022   Patient's PCP: Deniz Rubin, APRN-CNP, APRN - NP                                          Discharge Physician: Maira Carrillo MD MD  Admit Date: 9/24/2022   Admitting Physician: Tashi Guerrero MD    PROBLEMS DURING HOSPITALIZATION:  Present on Admission:   Severe sepsis with acute organ dysfunction ([facility] [place] [address]   Acute pyelonephritis   CALI (acute kidney injury) ([facility] [address]   Lactic acidosis   Hyponatremia   Hyperglycemia   Gastroesophageal reflux disease without esophagitis   Hyperlipidemia   HTN (hypertension)   E coli bacteremia   Complicated UTI (urinary tract infection)      DISCHARGE DIAGNOSES:  Severe Sepsis 2/2 Acute Pyelonephritis  Lactic Acidosis  E. coli bacteremia  Constipation  CALI  HTN  HLD  Carotid Stenosis s/p stent   Anxiety     Insomnia     HPI:  RAGHAV is a 58 y/o female with a PMHx of HTN, anxiety, internal cartoid artery occlusion and cholecystectomy who presents to the ED complaining of dysuria. She said the dysuria started two weeks ago and has been progressively getting worse. Today, she said she had problems dressing herself because she could not think straight, so she came straight to the ED. She reports headache, confusion, abdominal pain and fullness, fever and constipation. Her last bowel movement was several days ago. Although she feels her stomach rumble, nothing passes through. She denies chest pain, lightheadedness and hematuria. She was recently treated for a UTI at the beginning of July and has completed a course of Macrobid. ED course:    Vitals:   BP   /65-90   Temp   103.1   Pulse   125   Resp   16-18   SpO2    94-97   Labs: WBC=20.4; Creatinine of 2.0; Fa=638; U/a + for nitrites, LE, bacteria  Imaging: CT shows mild R hydronephrosis and mild/moderate left hydronephrosis. No identifiable stones. Suggestion of pyelonephritis and cystitis given enhancement around renal collecting systems. CT also shows fluid-filled loops of small bowel. Interventions: Sepsis bolus; Vancomycin and Cefepime    The following issues were addressed during hospitalization:    Severe Sepsis 2/2 Pyelonephritis  Lactic acidosis  E. coli bacteremia  Patient presented with 2 weeks of dysuria and abdominal pain along with complaints of headache confusion and fever. On arrival patient found to be tachycardic and febrile to 103 and was found to have have leukocytosis, lactic acidosis, and CALI. CT abdomen pelvis on arrival showed bilateral hydronephrosis suggestive of pyelonephritis, however no signs of obstruction were identified. Patient was started on Vanco/cefepime and blood cultures and urinalysis with reflex culture was taken. Patient was later switched to [facility]. Nephrology, urology and ID were consulted. Ultimately patient underwent bilateral ureteral stenting. Patient was noted to have improvement in her CALI as well has her urine output. Lactic acidosis decreased and leukocytosis remained elevated but did begin to downtrend. Blood cultures 1 of 2 are positive for pansensitive E. coli, thus patient was switched to levofloxacin. Ultimately patient was discharged with oral ciprofloxacin x9 days. Discussed with patient the benefits of taking this medication as well as the side effects including but not limited to C. Diff. Instructions were given to follow-up with her PCP in 1 week and with urology in 2 weeks for management of stents. Additionally patient advised to return to ED for any worsening symptoms including fever progressive burning urination, worsening back pain. Constipation  Patient also complains of abdominal pain and had not passed bowel movement in several days. CT on arrival showed fluid-filled loops suggestive of ileus, however bowel sounds are present making this unlikely.   This was managed with soapsuds and Senokot. Patient given prescription for probiotic to take upon discharge. CALI  Patient present with CALI to 2.0 (baseline 0.8), following ureteral stent placement creatinine began to downtrend and urine output began to increase. Nephrology was consulted this admission as well. Creatinine continued to downtrend and urine output remained adequate, patient was discharged and given instructions to follow-up with her PCP in 1 week. HTN  Patient was noted to have episodes of soft blood pressure, however BP remained relatively stable throughout admission. On admission home amlodipine, lisinopril, Toprol were held. Patient was discharged and instructed to discontinue use of lisinopril given CALI this admission. Patient will follow-up with PCP in 1 week, will advise PCP to consider restarting lisinopril following return of creatinine to baseline. HLD  Managed with home simvastatin and colestipol     Carotid Stenosis s/p stent   Plavix was discontinued this admission, patient will continue with her home aspirin. Anxiety   Home Zanaflex and escitalopram given. Insomnia   Home trazodone given    Physical Exam:  Constitutional:       General: She is not in acute distress. Appearance: Normal appearance. She is not ill-appearing. HENT:      Head: Normocephalic and atraumatic. Mouth/Throat:      Mouth: Mucous membranes are moist  Eyes:      Pupils: Pupils are equal, round, and reactive to light. Cardiovascular:      Rate and Rhythm: Regular rhythm. Regular Rate. Pulmonary:      Effort: Pulmonary effort is normal. No respiratory distress. Breath sounds: Wheezing present - improving  Abdominal:      General: Bowel sounds are normal. There is distension. Palpations: Abdomen is soft. Tenderness: There is abdominal tenderness (TTP to b/l flank, RUQ, LUQ) - Improving  Musculoskeletal:      Right lower leg: No edema. Left lower leg: No edema.    Skin:     General: Skin is warm and dry. Neurological:      General: No focal deficit present. Mental Status: She is alert and oriented to person, place, and time.    Psychiatric:         Mood and Affect: Mood normal.         Behavior: Behavior normal.      Consults: Urology, nephrology  Significant Diagnostic Studies: CT A/P, cystourethrogram, cystoscopy  Treatments: Antibiotics, B/L ureteral stent placement  Disposition: Home  Discharged Condition: Stable  Follow Up: Primary Care Physician in 1 week, urology follow-up in 2 weeks    DISCHARGE MEDICATION:       Medication List        START taking these medications      levoFLOXacin 500 MG tablet  Commonly known as: LEVAQUIN  Take 1 tablet by mouth daily for 9 doses  Start taking on: September 28, 2022     Probiotic Acidophilus Tabs  Take 1 tablet by mouth 2 times daily            CONTINUE taking these medications      acetaminophen 500 MG tablet  Commonly known as: TYLENOL     ALPRAZolam 0.5 MG tablet  Commonly known as: XANAX     amLODIPine 10 MG tablet  Commonly known as: NORVASC     aspirin 81 MG chewable tablet     atomoxetine 60 MG capsule  Commonly known as: STRATTERA     Calcium Citrate + D 250-200 MG-UNIT Tabs  Generic drug: calcium citrate-vitamin d     colestipol 1 g tablet  Commonly known as: COLESTID     escitalopram 10 MG tablet  Commonly known as: LEXAPRO     mesalamine 800 MG Tbec TBEC tablet  Commonly known as: DELZICOL     metoprolol succinate 50 MG extended release tablet  Commonly known as: TOPROL XL     ofloxacin 0.3 % otic solution  Commonly known as: FLOXIN     pantoprazole sodium 40 MG Pack packet  Commonly known as: PROTONIX     simvastatin 20 MG tablet  Commonly known as: ZOCOR     tiZANidine 4 MG capsule  Commonly known as: ZANAFLEX     traZODone 100 MG tablet  Commonly known as: DESYREL            STOP taking these medications      Claritin 10 MG capsule  Generic drug: loratadine     clopidogrel 75 MG tablet  Commonly known as: PLAVIX     fluticasone 50 MCG/ACT nasal spray  Commonly known as: FLONASE     lisinopril 20 MG tablet  Commonly known as: PRINIVIL;ZESTRIL               Where to Get Your Medications        These medications were sent to [facility], [address] Escobar Rosen [phone] - F [phone]  [address] [facility] [address]      Phone: [phone]   levoFLOXacin 500 MG tablet  Probiotic Acidophilus Tabs          Activity: activity as tolerated  Diet: regular diet  Wound Care: as directed    Time Spent on discharge is more than 30 minutes    Signed:  Jacobo Yang MD, PGY-1  9/27/2022

## 2022-09-27 NOTE — FLOWSHEET NOTE
Pt incontinent large soft bowel movement. Bed pad and diaper changed.  Pt still states she is feeling bad but states a little better than earlier in evening

## 2022-09-27 NOTE — PROGRESS NOTES
--   --  1.60*   ANIONGAP 14 12 12     Hepatic:   Recent Labs     09/24/22  1517   AST 26   ALT 13   BILITOT 0.4   PROT 6.8   LABALBU 3.8   ALKPHOS 77     Troponin: No results for input(s): TROPONINI in the last 72 hours. BNP: No results for input(s): BNP in the last 72 hours. Lipids: No results for input(s): CHOL, HDL in the last 72 hours. Invalid input(s): LDLCALCU, TRIGLYCERIDE  ABGs:  No results for input(s): PHART, WUP2LDD, PO2ART, PUF9JVW, BEART, THGBART, W4FNWEMJ, OZZ9LRC in the last 72 hours. INR: No results for input(s): INR in the last 72 hours. Lactate: No results for input(s): LACTATE in the last 72 hours. Cultures:  -----------------------------------------------------------------  RAD:   XR CHEST PORTABLE   Final Result      No acute pulmonary disease. CT CHEST ABDOMEN PELVIS W CONTRAST   Final Result      CHEST:   No acute bony process or consolidation      ABDOMEN AND PELVIS:      Mild right hydronephrosis and mild to moderate left hydronephrosis and hydroureter greater on the left with bladder wall thickening and without definitive obstructing stones. Correlate for cystitis and/or pyelonephritis with some enhancement around the    renal collecting systems suggesting infection, without obstructing stones identified      Nonspecific fluid-filled loops of small bowel may reflect an ileus. Moderate stool noted throughout the colon without obstructing lesion appreciated. Mild diverticulosis in the sigmoid region. XR CHEST PORTABLE   Final Result      1. No acute disease. Assessment/Plan:   Severe Sepsis 2/2 Pyelonephritis  Patient presented tachycardic, febrile with an elevated WBC of 20.1 and a positive U/A as the likely source. Given that she has evidence of fat stranding and kidney inflammation on CT, etiology is likely pyelo. Received sepsis bolus of 3L in ED along with vancocin and cefepime.  Lactate uptrending to 5.0 (1.6) this AM, however repeat normalized. - urology consulted - appreciate recs  - s/p B/L ureteral stents- remove in 2-3 wks per Urology  - nephrology consulted - appreciate recs  - Tammie Cheung d/c - praneeth to Levaquin (9/27-)  - will cont LR 15cc/hr - held  - f/u Urine Culture - negative  - f/u Blood Cultures x2 - 1 of 2 +ve E. Coli   - pansensitive  - Lactate normalized - will check this AM x1.  - ID consulted, appreciate recs. Constipation  Patient has not passed a bowel movement in several days. CT shows fluid filled loops in the small bowel indicative of an ileus. Positive bowel sounds make ileus unlikely.    - Soap Suds  - Senokot       CALI  Patient has a creatinine of 2.0 on a background of 0.8. Given b/l hydronephrosis and sepsis, likely both intra and post renal. Making good urine 9/25 AM.  - Good UOP and Cr downtrending this AM.  - Urine lytes   Na - less than 20, Cr 56 - prerenal?  - Nephro following, appreciate recs    HTN  BP softer this AM - low 90s  - hold BP meds - takes amlodipine, Lisinopril, Metropolol  - appreciate input from Nephrology. Chronic Problems  Carotid Stenosis s/p stent - Aspirin and Plavix  Anxiety  - Lexipro and Xanax prn   Insomnia - Trazodone     Code Status: FULL  FEN: Regular  PPX: lovenox  DISPO: Gabe Sifuentes MD, PGY-1  09/27/22  11:36 AM    This patient has been staffed and discussed with Ramon Davis MD.  Patient seen and examined, labs and imaging reviewed, agree with assessment and plan as outlined above. patients condition continues to improve doing well, asked patient to monitor side effects of medications which i've discussed at length, recurrence of symptoms or new symptoms including but not limited to chest pain, shortness of breath, nausea, vomiting, fevers or chills and seek immediate medical attention or call 911. Greater than 30 minutes spent on case on day of discharge.   Offered patient further hospitalization and stay given white cell count but she is adamant about going home,

## 2022-09-27 NOTE — FLOWSHEET NOTE
Pt up to bathroom heart rate in 150's, pt back to bed states she feels like she is dying she cant breathe is nauseated and overall feels terrible. Resident MD to bedside order for cxray to hold ivf's and to check blood glucose.  Pt 90 percent on RA placed on 2 L o2

## 2022-09-27 NOTE — FLOWSHEET NOTE
Pt up to br with pink tinged urine and is c/o burning.  Instructed pt again to wipe front to back and not back to front and to wash hands after urinating

## 2022-09-28 LAB — CULTURE, BLOOD 2: NORMAL

## 2023-01-20 ENCOUNTER — APPOINTMENT (OUTPATIENT)
Dept: MRI IMAGING | Age: 63
DRG: 897 | End: 2023-01-20
Payer: COMMERCIAL

## 2023-01-20 ENCOUNTER — APPOINTMENT (OUTPATIENT)
Dept: CT IMAGING | Age: 63
DRG: 897 | End: 2023-01-20
Payer: COMMERCIAL

## 2023-01-20 ENCOUNTER — HOSPITAL ENCOUNTER (INPATIENT)
Age: 63
LOS: 2 days | Discharge: HOME | DRG: 897 | End: 2023-01-22
Attending: STUDENT IN AN ORGANIZED HEALTH CARE EDUCATION/TRAINING PROGRAM | Admitting: INTERNAL MEDICINE
Payer: COMMERCIAL

## 2023-01-20 DIAGNOSIS — F19.951 HALLUCINATION, DRUG-INDUCED (HCC): Primary | ICD-10-CM

## 2023-01-20 PROBLEM — R44.1 HALLUCINATION, VISUAL: Status: ACTIVE | Noted: 2023-01-20

## 2023-01-20 LAB
A/G RATIO: 1.3 (ref 1.1–2.2)
ALBUMIN SERPL-MCNC: 4.1 G/DL (ref 3.4–5)
ALP BLD-CCNC: 73 U/L (ref 40–129)
ALT SERPL-CCNC: 25 U/L (ref 10–40)
AMPHETAMINE SCREEN, URINE: NORMAL
ANION GAP SERPL CALCULATED.3IONS-SCNC: 11 MMOL/L (ref 3–16)
AST SERPL-CCNC: 29 U/L (ref 15–37)
BARBITURATE SCREEN URINE: NORMAL
BASOPHILS ABSOLUTE: 0.1 K/UL (ref 0–0.2)
BASOPHILS RELATIVE PERCENT: 0.9 %
BENZODIAZEPINE SCREEN, URINE: NORMAL
BILIRUB SERPL-MCNC: <0.2 MG/DL (ref 0–1)
BILIRUBIN URINE: NEGATIVE
BLOOD, URINE: NEGATIVE
BUN BLDV-MCNC: 16 MG/DL (ref 7–20)
CALCIUM SERPL-MCNC: 9.2 MG/DL (ref 8.3–10.6)
CANNABINOID SCREEN URINE: NORMAL
CHLORIDE BLD-SCNC: 102 MMOL/L (ref 99–110)
CLARITY: CLEAR
CO2: 25 MMOL/L (ref 21–32)
COCAINE METABOLITE SCREEN URINE: NORMAL
COLOR: YELLOW
CREAT SERPL-MCNC: 1 MG/DL (ref 0.6–1.2)
EKG ATRIAL RATE: 72 BPM
EKG DIAGNOSIS: NORMAL
EKG P AXIS: 29 DEGREES
EKG P-R INTERVAL: 134 MS
EKG Q-T INTERVAL: 372 MS
EKG QRS DURATION: 74 MS
EKG QTC CALCULATION (BAZETT): 407 MS
EKG R AXIS: 24 DEGREES
EKG T AXIS: 49 DEGREES
EKG VENTRICULAR RATE: 72 BPM
EOSINOPHILS ABSOLUTE: 0.1 K/UL (ref 0–0.6)
EOSINOPHILS RELATIVE PERCENT: 0.8 %
FENTANYL SCREEN, URINE: NORMAL
GFR SERPL CREATININE-BSD FRML MDRD: >60 ML/MIN/{1.73_M2}
GLUCOSE BLD-MCNC: 115 MG/DL (ref 70–99)
GLUCOSE URINE: NEGATIVE MG/DL
HCT VFR BLD CALC: 41.4 % (ref 36–48)
HEMOGLOBIN: 13.7 G/DL (ref 12–16)
KETONES, URINE: NEGATIVE MG/DL
LEUKOCYTE ESTERASE, URINE: NEGATIVE
LYMPHOCYTES ABSOLUTE: 1.1 K/UL (ref 1–5.1)
LYMPHOCYTES RELATIVE PERCENT: 12.5 %
Lab: NORMAL
MCH RBC QN AUTO: 29 PG (ref 26–34)
MCHC RBC AUTO-ENTMCNC: 33 G/DL (ref 31–36)
MCV RBC AUTO: 88 FL (ref 80–100)
METHADONE SCREEN, URINE: NORMAL
MICROSCOPIC EXAMINATION: NORMAL
MONOCYTES ABSOLUTE: 0.4 K/UL (ref 0–1.3)
MONOCYTES RELATIVE PERCENT: 5 %
NEUTROPHILS ABSOLUTE: 7 K/UL (ref 1.7–7.7)
NEUTROPHILS RELATIVE PERCENT: 80.8 %
NITRITE, URINE: NEGATIVE
OPIATE SCREEN URINE: NORMAL
OXYCODONE URINE: NORMAL
PDW BLD-RTO: 15.4 % (ref 12.4–15.4)
PH UA: 6.5
PH UA: 6.5 (ref 5–8)
PHENCYCLIDINE SCREEN URINE: NORMAL
PLATELET # BLD: 221 K/UL (ref 135–450)
PMV BLD AUTO: 7.4 FL (ref 5–10.5)
POTASSIUM REFLEX MAGNESIUM: 4.1 MMOL/L (ref 3.5–5.1)
PROTEIN UA: NEGATIVE MG/DL
RBC # BLD: 4.71 M/UL (ref 4–5.2)
SODIUM BLD-SCNC: 138 MMOL/L (ref 136–145)
SPECIFIC GRAVITY UA: 1.01 (ref 1–1.03)
TOTAL PROTEIN: 7.2 G/DL (ref 6.4–8.2)
URINE REFLEX TO CULTURE: NORMAL
URINE TYPE: NORMAL
UROBILINOGEN, URINE: 0.2 E.U./DL
WBC # BLD: 8.6 K/UL (ref 4–11)

## 2023-01-20 PROCEDURE — 82140 ASSAY OF AMMONIA: CPT

## 2023-01-20 PROCEDURE — 93005 ELECTROCARDIOGRAM TRACING: CPT

## 2023-01-20 PROCEDURE — 6360000004 HC RX CONTRAST MEDICATION: Performed by: NURSE PRACTITIONER

## 2023-01-20 PROCEDURE — 99222 1ST HOSP IP/OBS MODERATE 55: CPT | Performed by: NURSE PRACTITIONER

## 2023-01-20 PROCEDURE — 81003 URINALYSIS AUTO W/O SCOPE: CPT

## 2023-01-20 PROCEDURE — 85025 COMPLETE CBC W/AUTO DIFF WBC: CPT

## 2023-01-20 PROCEDURE — 2580000003 HC RX 258: Performed by: INTERNAL MEDICINE

## 2023-01-20 PROCEDURE — 70450 CT HEAD/BRAIN W/O DYE: CPT

## 2023-01-20 PROCEDURE — 80053 COMPREHEN METABOLIC PANEL: CPT

## 2023-01-20 PROCEDURE — 99285 EMERGENCY DEPT VISIT HI MDM: CPT

## 2023-01-20 PROCEDURE — 1200000000 HC SEMI PRIVATE

## 2023-01-20 PROCEDURE — 84443 ASSAY THYROID STIM HORMONE: CPT

## 2023-01-20 PROCEDURE — 36415 COLL VENOUS BLD VENIPUNCTURE: CPT

## 2023-01-20 PROCEDURE — 6370000000 HC RX 637 (ALT 250 FOR IP)

## 2023-01-20 PROCEDURE — A9579 GAD-BASE MR CONTRAST NOS,1ML: HCPCS | Performed by: NURSE PRACTITIONER

## 2023-01-20 PROCEDURE — 70553 MRI BRAIN STEM W/O & W/DYE: CPT

## 2023-01-20 RX ORDER — SODIUM CHLORIDE 9 MG/ML
INJECTION, SOLUTION INTRAVENOUS PRN
Status: DISCONTINUED | OUTPATIENT
Start: 2023-01-20 | End: 2023-01-22 | Stop reason: HOSPADM

## 2023-01-20 RX ORDER — ACETAMINOPHEN 325 MG/1
650 TABLET ORAL EVERY 6 HOURS PRN
Status: DISCONTINUED | OUTPATIENT
Start: 2023-01-20 | End: 2023-01-22 | Stop reason: HOSPADM

## 2023-01-20 RX ORDER — ACETAMINOPHEN 650 MG/1
650 SUPPOSITORY RECTAL EVERY 6 HOURS PRN
Status: DISCONTINUED | OUTPATIENT
Start: 2023-01-20 | End: 2023-01-22 | Stop reason: HOSPADM

## 2023-01-20 RX ORDER — POLYETHYLENE GLYCOL 3350 17 G/17G
17 POWDER, FOR SOLUTION ORAL DAILY PRN
Status: DISCONTINUED | OUTPATIENT
Start: 2023-01-20 | End: 2023-01-22 | Stop reason: HOSPADM

## 2023-01-20 RX ORDER — ONDANSETRON 2 MG/ML
4 INJECTION INTRAMUSCULAR; INTRAVENOUS EVERY 6 HOURS PRN
Status: DISCONTINUED | OUTPATIENT
Start: 2023-01-20 | End: 2023-01-22 | Stop reason: HOSPADM

## 2023-01-20 RX ORDER — ONDANSETRON 4 MG/1
4 TABLET, ORALLY DISINTEGRATING ORAL EVERY 8 HOURS PRN
Status: DISCONTINUED | OUTPATIENT
Start: 2023-01-20 | End: 2023-01-22 | Stop reason: HOSPADM

## 2023-01-20 RX ORDER — SODIUM CHLORIDE 0.9 % (FLUSH) 0.9 %
5-40 SYRINGE (ML) INJECTION PRN
Status: DISCONTINUED | OUTPATIENT
Start: 2023-01-20 | End: 2023-01-22 | Stop reason: HOSPADM

## 2023-01-20 RX ORDER — AMLODIPINE BESYLATE 5 MG/1
5 TABLET ORAL DAILY
Status: DISCONTINUED | OUTPATIENT
Start: 2023-01-21 | End: 2023-01-22 | Stop reason: HOSPADM

## 2023-01-20 RX ORDER — BUSPIRONE HYDROCHLORIDE 5 MG/1
5 TABLET ORAL 3 TIMES DAILY
COMMUNITY

## 2023-01-20 RX ORDER — NICOTINE 21 MG/24HR
1 PATCH, TRANSDERMAL 24 HOURS TRANSDERMAL ONCE
Status: COMPLETED | OUTPATIENT
Start: 2023-01-20 | End: 2023-01-21

## 2023-01-20 RX ORDER — ENOXAPARIN SODIUM 100 MG/ML
40 INJECTION SUBCUTANEOUS DAILY
Status: DISCONTINUED | OUTPATIENT
Start: 2023-01-21 | End: 2023-01-22 | Stop reason: HOSPADM

## 2023-01-20 RX ORDER — SODIUM CHLORIDE 0.9 % (FLUSH) 0.9 %
5-40 SYRINGE (ML) INJECTION EVERY 12 HOURS SCHEDULED
Status: DISCONTINUED | OUTPATIENT
Start: 2023-01-20 | End: 2023-01-22 | Stop reason: HOSPADM

## 2023-01-20 RX ADMIN — SODIUM CHLORIDE, PRESERVATIVE FREE 10 ML: 5 INJECTION INTRAVENOUS at 23:26

## 2023-01-20 RX ADMIN — GADOTERIDOL 14 ML: 279.3 INJECTION, SOLUTION INTRAVENOUS at 20:32

## 2023-01-20 ASSESSMENT — SLEEP AND FATIGUE QUESTIONNAIRES
DO YOU HAVE DIFFICULTY SLEEPING: NO
AVERAGE NUMBER OF SLEEP HOURS: 6
DO YOU USE A SLEEP AID: NO

## 2023-01-20 ASSESSMENT — PAIN - FUNCTIONAL ASSESSMENT: PAIN_FUNCTIONAL_ASSESSMENT: NONE - DENIES PAIN

## 2023-01-20 NOTE — DISCHARGE INSTRUCTIONS
Your information:  Name: Luis Shore  : 1960    Your instructions:        What to do after you leave the hospital:    Recommended diet: regular diet    Recommended activity: activity as tolerated    Primary care physician: Follow up with your primary care physician within 1 week. The following personal items were collected during your admission and were returned to you:         Information obtained by:  By signing below, I understand that if any problems occur once I leave the hospital I am to contact my primary care doctor or go to the emergency room. I understand and acknowledge receipt of the instructions indicated above. You were seen in the emergency department for evaluation of visual hallucinations after taking Buspar. This is unfortunately a known but rare side effect of this medication. Fortunately, your lab work and head CT are reassuring, and our psychiatry team thinks you are safe to return home. Please return to the emergency department for any new or worsening symptoms, including confusion, weakness, or further vision changes.

## 2023-01-20 NOTE — CONSULTS
Neurology / [facility] Consult Note    Tonja Leung MD is requesting this consult. Reason for Consult: \"unilateral hallucinations\"  Admission Chief Complaint: hallucinations    History of Present Illness     Jef Rankin is a 57 y.o. y/o female with PMH significant for hypertension and hepatitis C who presents with a week and a half of hallucinations. Per my interview with the patient:  her hallucinations include a man on a horse and a man on a bike. Only present in right eye. This is the only time she has ever had this happen. She reports her symptoms began with Buspar starting but have not resolved since discontinuing the medication. She denies any recent infections, trauma, illnesses, other new medication changes (she stopped taking xanax 2 months ago). She denies any associated headache, lateralizing weakness, aphasia, or sensory changes. She denies any history of vision loss in either eye, stroke, or seizure. She does not drink alcohol. She denies having any psychiatric disorders but has been diagnosed with CONNOR and ADHD per chart review. Care Everywhere Records indicate home medications include trazodone, tizanidine, atomotexine, lexapro, and buspar. She has had such severe anxiety that she had to quit her job after going off xanax. She was recommended to follow up with Behavioral Counseling but has not. REVIEW OF SYSTEMS:   Constitutional- No weight loss or fevers   Eyes- No diplopia. No photophobia. Ears/nose/throat- No dysphagia. No Dysarthria   Cardiovascular- No palpitations. No chest pain   Respiratory- No dyspnea. No Cough   Gastrointestinal- No Abdominal pain. No Vomiting. Genitourinary- No incontinence. No urinary retention   Musculoskeletal- No myalgia. No arthralgia   Skin- No rash. No easy bruising. Psychiatric- No depression. No anxiety +hallucinations  Endocrine- No diabetes. No thyroid issues. Hematologic- No bleeding difficulty.  No fatigue Neurologic- No aphasia; no weakness    Past Medical, Surgical, Family, and Social History   PAST MEDICAL HISTORY:  Past Medical History:   Diagnosis Date    Anxiety     Arthritis     Hepatitis C     Hyperlipidemia     Hypertension     Reflux esophagitis      SURGICAL HISTORY:  Past Surgical History:   Procedure Laterality Date    BLADDER SURGERY Bilateral 9/25/2022    CYSTOSCOPY RETROGRADE PYELOGRAM STENT INSERTION BILATERAL performed by Sanchez Huber MD at [address] [place] Left 7/20/2020    LEFT CAROTID ENDARTERECTOMY WITH INTRAOPERATIVE DUPLEX SCAN performed by Arnold Quiros MD at [address]      left arm    JOINT REPLACEMENT      knee     FAMILY HISTORY & SOCIAL HISTORY:  Family history non-contributory  No family history on file. Social History     Tobacco Use    Smoking status: Every Day     Packs/day: 1.00     Years: 23.00     Pack years: 23.00     Types: Cigarettes    Smokeless tobacco: Never   Vaping Use    Vaping Use: Never used   Substance Use Topics    Alcohol use: Yes     Comment: soc    Drug use: Never     Allergies & Outpatient Medications   ALLERGIES:  Allergies   Allergen Reactions    Nut  [Macadamia Nut Oil] Hives    Peanut (Diagnostic) Hives and Swelling    Penicillins Hives and Nausea And Vomiting     Patient has tolerated cefepime as an inpatient (2022) per chart review on 1/20/23.      Buspirone Hcl Hallucinations     Patient reports visual hallucinations of \"man on a horse\" and \"man on a bike\"    Hydrochlorothiazide Other (See Comments)     Hyponatremia     HOME MEDICATIONS:  Patient's Medications   New Prescriptions    No medications on file   Previous Medications    AMLODIPINE (NORVASC) 5 MG TABLET    Take 5 mg by mouth daily    ASPIRIN 81 MG CHEWABLE TABLET    Take 81 mg by mouth daily    ATOMOXETINE (STRATTERA) 60 MG CAPSULE    Take 60 mg by mouth daily    BUSPIRONE (BUSPAR) 5 MG TABLET    Take 5 mg by mouth 3 times daily    ESCITALOPRAM (LEXAPRO) 10 MG TABLET    Take 10 mg by mouth daily    MESALAMINE (DELZICOL) 800 MG TBEC TBEC TABLET    Take 800 mg by mouth 3 times daily    PANTOPRAZOLE (PROTONIX) 40 MG TABLET    Take 40 mg by mouth 2 times daily    SIMVASTATIN (ZOCOR) 20 MG TABLET    Take 20 mg by mouth nightly    TIZANIDINE (ZANAFLEX) 4 MG CAPSULE    Take 12 mg by mouth at bedtime    TRAZODONE (DESYREL) 100 MG TABLET    Take 200 mg by mouth nightly   Modified Medications    No medications on file   Discontinued Medications    ACETAMINOPHEN (TYLENOL) 500 MG TABLET    Take 1,000 mg by mouth every 6 hours as needed for Pain    ALPRAZOLAM (XANAX) 0.5 MG TABLET    Take 0.5 mg by mouth 3 times daily as needed for Sleep.      CALCIUM CITRATE-VITAMIN D (CALCIUM CITRATE + D) 250-200 MG-UNIT TABS    Take by mouth    COLESTIPOL (COLESTID) 1 G TABLET    Take 3 g by mouth 2 times daily    METOPROLOL SUCCINATE (TOPROL XL) 50 MG EXTENDED RELEASE TABLET    Take 100 mg by mouth daily Only take evening dose if BP is high    OFLOXACIN (FLOXIN) 0.3 % OTIC SOLUTION    Place 5 drops in ear(s) daily     Physical Exam   PHYSICAL EXAM:  Vitals:    01/20/23 1121   BP: (!) 172/98   Pulse: (!) 104   Resp: 16   Temp: 98.1 °F (36.7 °C)   TempSrc: Oral   SpO2: 100%   Weight: 142 lb 11.2 oz (64.7 kg)   Height: 5' 4\" (1.626 m)         General: Alert, no distress, well-nourished  Neurologic  Mental status: alert  orientation to person, place, time, situation   Attention intact as able to attend well to the exam; able to name current president and read the clock   Language fluent in conversation   Comprehension intact; follows simple commands & 2 step commands crossing midline    Cranial nerves:   CN2: Visual fields full w/o extinction on confrontational testing   CN 3,4,6: Pupils equal and reactive to light, extraocular muscles intact  CN5: Facial sensation symmetric   CN7: Face symmetric without dysarthria  CN8: Hearing symmetric to spoken voice  CN9: Palate elevated symmetrically  CN11: Traps full strength on shoulder shrug  CN12: Tongue midline with protrusion    Motor Exam: full strength in all fou rlimbs  Sensory: light touch intact and symmetric in all 4 extremities. Cerebellar/coordination: finger nose finger normal without ataxia  Tone: normal in all 4 extremities  Gait: deferred for safety      OTHER SYSTEMS:  Cardiovascular: Warm, appears well perfused   Respiratory: Easy, non-labored respiratory pattern   Abdominal: Abdomen is without distention   Extremities: Upper and lower extremities are atraumatic in appearance without deformity. No swelling or erythema. Psychiatric: Cooperative with exam    Diagnostic Testing Results   IMAGES:  Images personally reviewed and agree w/ radiology interpretation. CT head: No acute intracranial abnormality     CARE EVERYWHERE  MRI brain 2020  1. No acute intracranial abnormality. Specifically, no acute infarction. 2. Mild parenchymal volume loss. Sequela of minimal chronic microvascular   ischemic changes. LABS:  All results below personally reviewed. Pertinent positives & negatives are addressed in Impression & Recommendations below. LABS   Metabolic Panel Recent Labs     01/20/23  1136      K 4.1      CO2 25   BUN 16   CREATININE 1.0   GLUCOSE 115*   CALCIUM 9.2   LABALBU 4.1   ALKPHOS 73   ALT 25   AST 29      CBC / Coags Recent Labs     01/20/23  1136   WBC 8.6   RBC 4.71   HGB 13.7   HCT 41.4         Other Toxicology negative         CURRENT SCHEDULED MEDICATIONS   Inpatient Medications     nicotine, 1 patch, TransDERmal, Once                   IMPRESSION & RECOMMENDATIONS     IMPRESSION:  Ms. Mayito Lei is a 56 y/o woman with CONNOR, ADHD who presents with one week of non-threatening, isolated hallucinations without otherwise focal neurologic deficit.      I suspect her symptoms may be related to the numerous psychotropic medications she is prescribed or similarly related to her underlying psychiatric disease (which she denied for me). It would be impossible to exclude underlying etiologies such as strategically placed stroke, focal seizure, etc., without further investigation and diagnostic testing. We discussed this and she seems agreeable to stay. RECOMMENDATIONS:  - Would inventory medication list and reconsider atomoxetine as this can be associated with hallucinations (more so than Buspar)  - MRI brain w/wo nan to exclude structural lesion  - Routine EEG would be reasonable if she is agreeable to stay for this. - TSH, Ammonia level  - She needs outpatient psychiatry follow up    ROOSEVELT Pedraza - [facility]   Neurology & Neurocritical Care   1/20/2023 6:32 PM    Floor / PCU Patients:  Neurology Line: [phone]  [facility]: [facility]    Time independently spent by Nurse Practitioner reviewing chart and prior testing, obtaining history from patient, examining patient, ordering appropriate medications / diagnostics, reviewing results of diagnostics, counseling and educating patient / family, communicating plan with other providers and documenting clinical information in the EMR was approximately 60 minutes.

## 2023-01-20 NOTE — ED PROVIDER NOTES
[id number] RESIDENT NOTE       Date of evaluation: 1/20/2023    Chief Complaint     Hallucinations    History of Present Illness     Rj Martinez is a 57 y.o. female with a history of hypertension, hyperlipidemia, hepatitis C, and anxiety who presents for evaluation of hallucinations. The patient reports that she was started on Buspar for anxiety on 1/12, and that two days after taking this medication she developed visual hallucinations of a \"man on a horse\" and a \"man on a bicycle. \" These hallucinations are only present in her right eye, but are constant, despite stopping the Buspar on 1/15. On ED arrival, she endorses new blurred vision in her left eye, as well as tinnitus. She denies focal numbness/tingling/weakness, SI, HI, fevers, chills, nausea, vomiting, shortness of breath, chest pain, abdominal pain, and urinary symptoms. MEDICAL DECISION MAKING / ASSESSMENT / PLAN     INITIAL VITALS: BP: (!) 172/98, Temp: 98.1 °F (36.7 °C), Heart Rate: (!) 104, Resp: 16, SpO2: 100 %    Rj Martinez is a 57 y.o. female presenting for evaluation of unilateral hallucinations after starting a new medication, with no other neurologic symptoms or findings on exam. Buspar certainly does have hallucinations listed as one of its known side effects, but I would not expect the hallucinations to last for five days after the patient stopped the medication, given its short half-life of 2-3 hours. I therefore had concern for other cause of hallucinations, including CVA, brain lesion, psychiatric issue, substance use, and hypoglycemia/electrolyte abnormalities, among others. I therefore ordered a broad laboratory workup, which was completely normal. Specifically, the patient had a normal CBC, CMP, UDS, UA, ammonia, and TSH. CT head was also unremarkable. I then contacted Psychiatry and Neurology, who each evaluated the patient.  Tele-Psych had minimal concern for psychiatric issue, and signed off. Neurology felt that the patient required MRI for complete evaluation. As it is a weekend evening, it will take some time to obtain this test. The patient was therefore admitted for MRI. Medical Decision Making  Amount and/or Complexity of Data Reviewed  Labs: ordered. Radiology: ordered. ECG/medicine tests: ordered. Risk  OTC drugs. Decision regarding hospitalization. This patient was also evaluated by the attending physician. All care plans werediscussed and agreed upon. Clinical Impression     1. Hallucination, drug-induced ([facility] [address]        Disposition     PATIENT REFERRED TO:  No follow-up provider specified. DISCHARGE MEDICATIONS:  Discharge Medication List as of 1/22/2023 10:15 AM          DISPOSITION Admitted 01/20/2023 08:19:31 PM        Diagnostic Results and Other Data     RADIOLOGY:  MRI BRAIN W WO CONTRAST   Final Result      1. No acute intracranial abnormality. 2.  Mild cerebral atrophy and mild chronic small vessel ischemic disease. CT Head W/O Contrast   Final Result      No acute intracranial abnormality.           LABS:   Results for orders placed or performed during the hospital encounter of 01/20/23   CBC with Auto Differential   Result Value Ref Range    WBC 8.6 4.0 - 11.0 K/uL    RBC 4.71 4.00 - 5.20 M/uL    Hemoglobin 13.7 12.0 - 16.0 g/dL    Hematocrit 41.4 36.0 - 48.0 %    MCV 88.0 80.0 - 100.0 fL    MCH 29.0 26.0 - 34.0 pg    MCHC 33.0 31.0 - 36.0 g/dL    RDW 15.4 12.4 - 15.4 %    Platelets 566 208 - 611 K/uL    MPV 7.4 5.0 - 10.5 fL    Neutrophils % 80.8 %    Lymphocytes % 12.5 %    Monocytes % 5.0 %    Eosinophils % 0.8 %    Basophils % 0.9 %    Neutrophils Absolute 7.0 1.7 - 7.7 K/uL    Lymphocytes Absolute 1.1 1.0 - 5.1 K/uL    Monocytes Absolute 0.4 0.0 - 1.3 K/uL    Eosinophils Absolute 0.1 0.0 - 0.6 K/uL    Basophils Absolute 0.1 0.0 - 0.2 K/uL   CMP w/ Reflex to MG   Result Value Ref Range    Sodium 138 136 - 145 mmol/L Potassium reflex Magnesium 4.1 3.5 - 5.1 mmol/L    Chloride 102 99 - 110 mmol/L    CO2 25 21 - 32 mmol/L    Anion Gap 11 3 - 16    Glucose 115 (H) 70 - 99 mg/dL    BUN 16 7 - 20 mg/dL    Creatinine 1.0 0.6 - 1.2 mg/dL    Est, Glom Filt Rate >60 >60    Calcium 9.2 8.3 - 10.6 mg/dL    Total Protein 7.2 6.4 - 8.2 g/dL    Albumin 4.1 3.4 - 5.0 g/dL    Albumin/Globulin Ratio 1.3 1.1 - 2.2    Total Bilirubin <0.2 0.0 - 1.0 mg/dL    Alkaline Phosphatase 73 40 - 129 U/L    ALT 25 10 - 40 U/L    AST 29 15 - 37 U/L   Urinalysis with Reflex to Culture    Specimen: Urine   Result Value Ref Range    Color, UA Yellow Straw/Yellow    Clarity, UA Clear Clear    Glucose, Ur Negative Negative mg/dL    Bilirubin Urine Negative Negative    Ketones, Urine Negative Negative mg/dL    Specific Gravity, UA 1.010 1.005 - 1.030    Blood, Urine Negative Negative    pH, UA 6.5 5.0 - 8.0    Protein, UA Negative Negative mg/dL    Urobilinogen, Urine 0.2 <2.0 E.U./dL    Nitrite, Urine Negative Negative    Leukocyte Esterase, Urine Negative Negative    Microscopic Examination Not Indicated     Urine Type NotGiven     Urine Reflex to Culture Not Indicated    Urine Drug Screen   Result Value Ref Range    Amphetamine Screen, Urine Neg Negative <1000ng/mL    Barbiturate Screen, Ur Neg Negative <200 ng/mL    Benzodiazepine Screen, Urine Neg Negative <200 ng/mL    Cannabinoid Scrn, Ur Neg Negative <50 ng/mL    Cocaine Metabolite Screen, Urine Neg Negative <300 ng/mL    Opiate Scrn, Ur Neg Negative <300 ng/mL    PCP Screen, Urine Neg Negative <25 ng/mL    Methadone Screen, Urine Neg Negative <300 ng/mL    Oxycodone Urine Neg Negative <100 ng/ml    FENTANYL SCREEN, URINE Neg Negative <5 ng/mL    pH, UA 6.5     Drug Screen Comment: see below    TSH with Reflex   Result Value Ref Range    TSH 2.61 0.27 - 4.20 uIU/mL   Ammonia   Result Value Ref Range    Ammonia 30 11 - 51 umol/L   CBC with Auto Differential   Result Value Ref Range    WBC 7.8 4.0 - 11.0 K/uL    RBC 5.03 4.00 - 5.20 M/uL    Hemoglobin 14.3 12.0 - 16.0 g/dL    Hematocrit 44.3 36.0 - 48.0 %    MCV 88.0 80.0 - 100.0 fL    MCH 28.5 26.0 - 34.0 pg    MCHC 32.4 31.0 - 36.0 g/dL    RDW 15.4 12.4 - 15.4 %    Platelets 361 163 - 404 K/uL    MPV 7.6 5.0 - 10.5 fL    Neutrophils % 71.6 %    Lymphocytes % 18.7 %    Monocytes % 6.9 %    Eosinophils % 2.0 %    Basophils % 0.8 %    Neutrophils Absolute 5.6 1.7 - 7.7 K/uL    Lymphocytes Absolute 1.5 1.0 - 5.1 K/uL    Monocytes Absolute 0.5 0.0 - 1.3 K/uL    Eosinophils Absolute 0.2 0.0 - 0.6 K/uL    Basophils Absolute 0.1 0.0 - 0.2 K/uL   Comprehensive Metabolic Panel w/ Reflex to MG   Result Value Ref Range    Sodium 139 136 - 145 mmol/L    Potassium reflex Magnesium 3.8 3.5 - 5.1 mmol/L    Chloride 104 99 - 110 mmol/L    CO2 24 21 - 32 mmol/L    Anion Gap 11 3 - 16    Glucose 81 70 - 99 mg/dL    BUN 21 (H) 7 - 20 mg/dL    Creatinine 1.0 0.6 - 1.2 mg/dL    Est, Glom Filt Rate >60 >60    Calcium 9.5 8.3 - 10.6 mg/dL    Total Protein 6.9 6.4 - 8.2 g/dL    Albumin 4.0 3.4 - 5.0 g/dL    Albumin/Globulin Ratio 1.4 1.1 - 2.2    Total Bilirubin 0.3 0.0 - 1.0 mg/dL    Alkaline Phosphatase 68 40 - 129 U/L    ALT 19 10 - 40 U/L    AST 23 15 - 37 U/L   EKG 12 Lead   Result Value Ref Range    Ventricular Rate 72 BPM    Atrial Rate 72 BPM    P-R Interval 134 ms    QRS Duration 74 ms    Q-T Interval 372 ms    QTc Calculation (Bazett) 407 ms    P Axis 29 degrees    R Axis 24 degrees    T Axis 49 degrees    Diagnosis       EKG performed in ER and to be interpreted by ER physician. Confirmed by MD, ER ([id number]),  Omari Head ([id number]) on 1/20/2023 11:39:44 AM     EKG   Interpreted in conjunction with emergencydepartment physician No att. providers found  Rhythm: normal sinus   Rate: normal  Axis: normal  Ectopy: none  Conduction: normal  ST Segments: normal  T Waves:inversion in  aVr  Clinical Impression: normal EKG  Comparison: compared to previous, ST elevation and sinus tachycardia have resolved    RECENT VITALS:  BP: 115/73, Temp: 98.4 °F (36.9 °C), Heart Rate: 71,Resp: 18, SpO2: 97 %     ED Course     Nursing Notes, Past Medical Hx, Past Surgical Hx, Social Hx, Allergies, and Family Hx were reviewed. The patient was given the following medications:  Orders Placed This Encounter   Medications    nicotine (NICODERM CQ) 21 MG/24HR 1 patch    DISCONTD: sodium chloride flush 0.9 % injection 5-40 mL    DISCONTD: sodium chloride flush 0.9 % injection 5-40 mL    DISCONTD: 0.9 % sodium chloride infusion    DISCONTD: enoxaparin (LOVENOX) injection 40 mg     Order Specific Question:   Indication of Use     Answer:   Prophylaxis-DVT/PE    DISCONTD: ondansetron (ZOFRAN-ODT) disintegrating tablet 4 mg    DISCONTD: ondansetron (ZOFRAN) injection 4 mg    DISCONTD: polyethylene glycol (GLYCOLAX) packet 17 g    DISCONTD: acetaminophen (TYLENOL) tablet 650 mg    DISCONTD: acetaminophen (TYLENOL) suppository 650 mg    DISCONTD: amLODIPine (NORVASC) tablet 5 mg    gadoteridol (PROHANCE) injection 14 mL    DISCONTD: metoprolol succinate (TOPROL XL) extended release tablet 50 mg    DISCONTD: atorvastatin (LIPITOR) tablet 40 mg    DISCONTD: traZODone (DESYREL) tablet 200 mg    DISCONTD: tiZANidine (ZANAFLEX) tablet 12 mg    metoprolol succinate (TOPROL XL) 50 MG extended release tablet     Sig: Take 0.5 tablets by mouth daily for 15 days     Dispense:  15 tablet     Refill:  0       CONSULTS:  IP CONSULT TO TELE-PSYCH (SOCIAL WORK ONLY)  IP CONSULT TO TELE-PSYCH PROVIDER  IP CONSULT TO NEUROLOGY  IP CONSULT TO HOSPITALIST  IP CONSULT TO NEUROLOGY    Review of Systems     Review of Systems    Past Medical, Surgical, Family, and Social History     She has a past medical history of Anxiety, Arthritis, Hepatitis C, Hyperlipidemia, Hypertension, and Reflux esophagitis. She has a past surgical history that includes joint replacement; fracture surgery;  Carotid endarterectomy (Left, 7/20/2020); and Bladder surgery (Bilateral, 9/25/2022). Her family history is not on file. She reports that she has been smoking cigarettes. She has a 23.00 pack-year smoking history. She has never used smokeless tobacco. She reports current alcohol use. She reports that she does not use drugs. Medications     Discharge Medication List as of 1/22/2023 10:15 AM        CONTINUE these medications which have NOT CHANGED    Details   busPIRone (BUSPAR) 5 MG tablet Take 5 mg by mouth 3 times dailyHistorical Med      mesalamine (DELZICOL) 800 MG TBEC TBEC tablet Take 800 mg by mouth 3 times dailyHistorical Med      simvastatin (ZOCOR) 20 MG tablet Take 20 mg by mouth nightlyHistorical Med      tiZANidine (ZANAFLEX) 4 MG capsule Take 12 mg by mouth at bedtimeHistorical Med      aspirin 81 MG chewable tablet Take 81 mg by mouth dailyHistorical Med      amLODIPine (NORVASC) 5 MG tablet Take 5 mg by mouth dailyHistorical Med      traZODone (DESYREL) 100 MG tablet Take 200 mg by mouth nightlyHistorical Med      escitalopram (LEXAPRO) 10 MG tablet Take 10 mg by mouth dailyHistorical Med      pantoprazole (PROTONIX) 40 MG tablet Take 40 mg by mouth 2 times dailyHistorical Med             Allergies     She is allergic to nut  [macadamia nut oil], peanut (diagnostic), penicillins, buspirone hcl, and hydrochlorothiazide.     Physical Exam     INITIAL VITALS: BP: (!) 172/98, Temp: 98.1 °F (36.7 °C), Heart Rate: (!) 104, Resp: 16, SpO2: 100 %   Physical Exam            Roddy Ballard MD  01/29/23 1116

## 2023-01-20 NOTE — VIRTUAL HEALTH
[place] Consult to [employer]  Consult performed by: ROOSEVELT Valle - CNP  Consult ordered by: Adore Lacy MD  Reason for consult: Psychosis    Israel Calloway  [id number]  1960     EMERGENCY DEPARTMENT TELEPSYCHIATRY EVALUATION    01/20/23    History obtained from: Patient, chart review  Record Review: moderate      ID: Israel Calloway is a 57 y.o. y.o. female    CC: \"I started hallucinating out of my right eye. \"    HPI: Patient is a 57 y.o.  female who presents for visual hallucinations. Patient presented to the ED on 01/20/23 as a walk-in. Patient reports that she started taking Buspar 5mg a week and a half ago. She states that she took the medication for a few days then began to experience a visual hallucination of a \"man on a horse. \"  She reports that she stopped taking Buspar after this but continues to now see a \"man on a bike. \" Patient reports that this hallucination has been gradually resolving and states \"It's almost gone now. \"  Patient reports a history of anxiety and prior to being started on Buspar had been taking Xanax as needed \"for years. \"  She states that her primary care provider had stopped the xanax \"cold turkey\" 2 or 3 months ago. Denies history of outpatient psychiatric treatment from a psychiatric provider or therapist.  She reports that her PCP prescribes her psychiatric medications including Trazodone, Lexapro, and Strattera. Patient rated depression a 0, on a scale of zero to ten with ten being the worst and zero being none. Patient rated anxiety a 5, on the same scale.      Sleep: good - \"at least 10 hours nightly\"  Appetite: Normal    Current suicidal ideations: Denies   Current homicidal ideations: Denies  Current auditory/visual hallucinations: Endorses VH of a \"man on a bike\" - Denies AH    No TD noted, AIMS = 0.     C-SSRS Suicide Screening 1/20/2023   1) Within the past month, have you wished you were dead or wished you could go to sleep and not wake up? No   2) Have you actually had any thoughts of killing yourself? No   6) Have you ever done anything, started to do anything, or prepared to do anything to end your life? No      Risk of Suicide: Low Risk    Past Psychiatric History:  Previous diagnoses/symptoms: Anxiety, ADHD  Self-injurious behavior/risky thoughts or behaviors (past suicidal ideation/attempt): No  Violence/Risk to others (past homicidal ideation/attempt): No  Previous inpatient psychiatric hospitalizations: No  Current outpatient psychiatric provider: None - sees PCP for psychiatric medications   Current therapist: None  Previous psychiatric medication trials: xanax, buspar  History of ECT: No    Substance Abuse History:  Tobacco: Endorses 1 pack daily  Caffeine: Endorses 3 cups of coffee in the morning   Alcohol: Endorses drinking few times a week - \"no more than 6 beers\"   Marijuana: Denies  Cocaine: Denies  Opiates: Denies  Benzodiazepine: Denies  Other illicit drug usage: Denies  Legal consequences of substance/alcohol use: Yes - DUI \"20+ years ago\"  History of substance/alcohol abuse treatment: No  Readiness for substance/alcohol abuse treatment, if applicable: N/A    Social History:  Childhood: [place], [place]. Raised by mother and father. Has 1 sister, 2 brothers   Psychological trauma, neglect, or abuse (physical, sexual, emotional): Denies  Education: [employer] school  Living Situation/Interest: Lives in a house with a boyfriend   Marital/Committed relationship and parenting history: In a relationship with current boyfriend, Jaz Mike. Has 2 sons. Occupation: Unemployed  Legal History: Denies  Spiritual History: Denies  Access to guns or other weapons: Denies    Family Psychiatric History:   Family history of mental health conditions: No  Family history of suicide attempts or completions: No   Family history of psychiatric hospitalizations:  No   Other family history: No family history on file.      Past Medical History:  History of head trauma or TBI: No  History of seizures: No  History of Hep C or HIV: Yes - Hep C  Other Past Medical History: Active Ambulatory Problems     Diagnosis Date Noted    Hyponatremia 07/14/2020    Hypokalemia 07/15/2020    Internal carotid artery occlusion, left 07/15/2020    Leukopenia 07/15/2020    Hepatitis C 07/15/2020    HTN (hypertension) 07/15/2020    Severe sepsis with acute organ dysfunction ([facility] [address] 09/25/2022    Acute pyelonephritis 09/25/2022    CALI (acute kidney injury) ([facility] [address] 09/25/2022    Lactic acidosis 09/25/2022    Hyperglycemia 09/25/2022    Gastroesophageal reflux disease without esophagitis 09/25/2022    Hyperlipidemia 09/25/2022    E coli bacteremia 90/63/8319    Complicated UTI (urinary tract infection) 09/26/2022     Resolved Ambulatory Problems     Diagnosis Date Noted    No Resolved Ambulatory Problems     Past Medical History:   Diagnosis Date    Anxiety     Arthritis     Hypertension     Reflux esophagitis      Past Surgical History:       Procedure Laterality Date    BLADDER SURGERY Bilateral 9/25/2022    CYSTOSCOPY RETROGRADE PYELOGRAM STENT INSERTION BILATERAL performed by Lucia Dunaway MD at [address] Left 7/20/2020    LEFT CAROTID ENDARTERECTOMY WITH INTRAOPERATIVE DUPLEX SCAN performed by Laura William MD at [address]      left arm    JOINT REPLACEMENT      knee     Allergies: Allergies   Allergen Reactions    Nut  [Macadamia Nut Oil] Hives    Peanut (Diagnostic) Hives and Swelling    Penicillins Hives and Nausea And Vomiting     Patient has tolerated cefepime as an inpatient (2022) per chart review on 1/20/23. Hydrochlorothiazide Other (See Comments)     Hyponatremia      Medications:  No current facility-administered medications for this encounter.     Current Outpatient Medications:     busPIRone (BUSPAR) 5 MG tablet, Take 5 mg by mouth 3 times daily, Disp: , Rfl:     atomoxetine (STRATTERA) 60 MG capsule, Take 60 mg by mouth daily, Disp: , Rfl:     mesalamine (DELZICOL) 800 MG TBEC TBEC tablet, Take 800 mg by mouth 3 times daily, Disp: , Rfl:     simvastatin (ZOCOR) 20 MG tablet, Take 20 mg by mouth nightly, Disp: , Rfl:     tiZANidine (ZANAFLEX) 4 MG capsule, Take 12 mg by mouth at bedtime, Disp: , Rfl:     aspirin 81 MG chewable tablet, Take 81 mg by mouth daily, Disp: , Rfl:     amLODIPine (NORVASC) 5 MG tablet, Take 5 mg by mouth daily, Disp: , Rfl:     traZODone (DESYREL) 100 MG tablet, Take 200 mg by mouth nightly, Disp: , Rfl:     escitalopram (LEXAPRO) 10 MG tablet, Take 10 mg by mouth daily, Disp: , Rfl:     pantoprazole (PROTONIX) 40 MG tablet, Take 40 mg by mouth 2 times daily, Disp: , Rfl:   Not in a hospital admission. Prior to Admission medications    Medication Sig Start Date End Date Taking?  Authorizing Provider   busPIRone (BUSPAR) 5 MG tablet Take 5 mg by mouth 3 times daily   Yes Historical Provider, MD   atomoxetine (STRATTERA) 60 MG capsule Take 60 mg by mouth daily 9/13/22   Historical Provider, MD   mesalamine (DELZICOL) 800 MG TBEC TBEC tablet Take 800 mg by mouth 3 times daily 9/22/22   Historical Provider, MD   simvastatin (ZOCOR) 20 MG tablet Take 20 mg by mouth nightly 6/10/20 6/10/21  Historical Provider, MD   tiZANidine (ZANAFLEX) 4 MG capsule Take 12 mg by mouth at bedtime    Historical Provider, MD   aspirin 81 MG chewable tablet Take 81 mg by mouth daily    Historical Provider, MD   amLODIPine (NORVASC) 5 MG tablet Take 5 mg by mouth daily    Historical Provider, MD   traZODone (DESYREL) 100 MG tablet Take 200 mg by mouth nightly    Historical Provider, MD   escitalopram (LEXAPRO) 10 MG tablet Take 10 mg by mouth daily    Historical Provider, MD   pantoprazole (PROTONIX) 40 MG tablet Take 40 mg by mouth 2 times daily    Historical Provider, MD        Problem List:   Patient Active Problem List    Diagnosis Date Noted    E coli bacteremia 94/67/3062    Complicated UTI (urinary tract infection) 09/26/2022    Severe sepsis with acute organ dysfunction ([facility] 09/25/2022    Acute pyelonephritis 09/25/2022    CALI (acute kidney injury) ([facility] 09/25/2022    Lactic acidosis 09/25/2022    Hyperglycemia 09/25/2022    Gastroesophageal reflux disease without esophagitis 09/25/2022    Hyperlipidemia 09/25/2022    Hypokalemia 07/15/2020    Internal carotid artery occlusion, left 07/15/2020    Leukopenia 07/15/2020    Hepatitis C 07/15/2020    HTN (hypertension) 07/15/2020    Hyponatremia 07/14/2020        OBJECTIVE  Vital Signs:  Vitals:    01/20/23 1121   BP: (!) 172/98   Pulse: (!) 104   Resp: 16   Temp: 98.1 °F (36.7 °C)   SpO2: 100%       Labs:  Reviewed. UDS: Pending  ETOH: Not completed    EKG:   Reviewed. QTC: 407  Recent Results (from the past 48 hour(s))   EKG 12 Lead    Collection Time: 01/20/23 11:33 AM   Result Value Ref Range    Ventricular Rate 72 BPM    Atrial Rate 72 BPM    P-R Interval 134 ms    QRS Duration 74 ms    Q-T Interval 372 ms    QTc Calculation (Bazett) 407 ms    P Axis 29 degrees    R Axis 24 degrees    T Axis 49 degrees    Diagnosis       EKG performed in ER and to be interpreted by ER physician. Confirmed by MD, ER ([id number]),  Adonis Griggs ([id number]) on 1/20/2023 11:39:44 AM   CBC with Auto Differential    Collection Time: 01/20/23 11:36 AM   Result Value Ref Range    WBC 8.6 4.0 - 11.0 K/uL    RBC 4.71 4.00 - 5.20 M/uL    Hemoglobin 13.7 12.0 - 16.0 g/dL    Hematocrit 41.4 36.0 - 48.0 %    MCV 88.0 80.0 - 100.0 fL    MCH 29.0 26.0 - 34.0 pg    MCHC 33.0 31.0 - 36.0 g/dL    RDW 15.4 12.4 - 15.4 %    Platelets 714 428 - 686 K/uL    MPV 7.4 5.0 - 10.5 fL    Neutrophils % 80.8 %    Lymphocytes % 12.5 %    Monocytes % 5.0 %    Eosinophils % 0.8 %    Basophils % 0.9 %    Neutrophils Absolute 7.0 1.7 - 7.7 K/uL    Lymphocytes Absolute 1.1 1.0 - 5.1 K/uL    Monocytes Absolute 0.4 0.0 - 1.3 K/uL    Eosinophils Absolute 0.1 0.0 - 0.6 K/uL    Basophils Absolute 0.1 0.0 - 0.2 K/uL   CMP w/ Reflex to MG    Collection Time: 01/20/23 11:36 AM   Result Value Ref Range    Sodium 138 136 - 145 mmol/L    Potassium reflex Magnesium 4.1 3.5 - 5.1 mmol/L    Chloride 102 99 - 110 mmol/L    CO2 25 21 - 32 mmol/L    Anion Gap 11 3 - 16    Glucose 115 (H) 70 - 99 mg/dL    BUN 16 7 - 20 mg/dL    Creatinine 1.0 0.6 - 1.2 mg/dL    Est, Glom Filt Rate >60 >60    Calcium 9.2 8.3 - 10.6 mg/dL    Total Protein 7.2 6.4 - 8.2 g/dL    Albumin 4.1 3.4 - 5.0 g/dL    Albumin/Globulin Ratio 1.3 1.1 - 2.2    Total Bilirubin <0.2 0.0 - 1.0 mg/dL    Alkaline Phosphatase 73 40 - 129 U/L    ALT 25 10 - 40 U/L    AST 29 15 - 37 U/L       Imaging:   CT Head W/O Contrast   Final Result      No acute intracranial abnormality. Radiology:  CT Head W/O Contrast    Result Date: 1/20/2023  CT HEAD WITHOUT CONTRAST: REASON FOR EXAM: Hallucinations TECHNIQUE: Axial CT imaging obtained from vertex to skull base. Axial images and multiplanar reformatted images reviewed. Individualized dose optimization technique was used in order to meet ALARA standards for radiation dose reduction. In addition to  vendor specific dose reduction algorithms, the dose reduction techniques vary based on the specific scanner utilized but frequently include automated exposure control, adjustment of the mA and/or kV according to patient size, and use of iterative reconstruction technique. COMPARISON: July 19, 2020 FINDINGS: There is no acute intracranial hemorrhage, midline shift, or mass effect. Gray-white differentiation is maintained. Ventricles are normal in size and position. Basal cisterns are preserved. Visualized paranasal sinuses and mastoid air cells are  clear. No acute or suspicious bony abnormality. No acute intracranial abnormality. Review of Systems:  Reports no current cardiovascular, respiratory, gastrointestinal, genitourinary, integumentary, neurological, musculoskeletal, or immunological symptoms today.    PSYCHIATRIC: See HPI above.      PSYCHIATRIC EXAMINATION / MENTAL STATUS EXAM    Vitals: BP (!) 172/98   Pulse (!) 104   Temp 98.1 °F (36.7 °C) (Oral)   Resp 16   Ht 5' 4\" (1.626 m)   Wt 142 lb 11.2 oz (64.7 kg)   SpO2 100%   BMI 24.49 kg/m²   CONSTITUTIONAL:    Level of consciousness:  within normal limits   Appearance:  street clothes, seated in bed, good grooming, and good hygiene. Does appear stated age. No acute distress. Behavior/Motor:  no abnormalities noted  Attitude toward examiner:  cooperative, attentive, and good eye contact  Motor: no psychomotor agitation, retardation or abnormal movements noted  Speech:  spontaneous, normal rate, and normal volume, normal tone  Mood: anxious  Affect: euthymic, full range, non-labile, mood congruent  Thought Production: spontaneous  Thought Processes:  linear, goal directed, and coherent. No tangentiality or circumstantiality. No flight of ideas or loosening of associations. Thought Content:  Homicidal ideation: Denies  Suicidal Ideation: Denies  Perceptual Disturbance:  Visual hallucination of \"man on bike in right eye. \" Denies AH. No delusions or other perceptual abnormalities. Cognition:  oriented to person, place, and time   Concentration: intact  Memory: intact, though not formally tested. Insight: fair   Judgement: fair   Fund of Knowledge: adequate    Neuro Exam:   Muscle Strength & Tone: CHAVEZ  Gait: CHAVEZ  Involuntary Movements: No      Impression:   Acute Psychosis - likely medication/substance induced  Generalized Anxiety Disorder - per chart  ADHD - per chart      Plan:  Patient does not meet criteria for a psychiatric hold at this time. Patient is ok to be discharged from a psychiatric point of view when medically appropriate. Recommend completing UDS to rule-out substance-induced cause of symptoms.    Recommend that patient follow-up with outpatient PCP at soonest available appointment until patient can be established with local outpatient psychiatric services for psychiatric medication management, likely [facility] or [email or web address], per note from recent visit with PCP. Reviewed treatment plan with patient including risks, benefits, alternatives, and side effects of medications, and any/all black box warnings. Patient verbalized understanding. Obtained informed consent for treatment. Medical records, labs, and diagnotic tests reviewed. Patient had an opportunity to ask questions and address concerns. Patient was in agreement with treatment plan. Supportive therapy provided. Medications: Recommend short-term supply of Olanzapine 5mg PO QHS to help manage symptoms of VH and anxiety until patient can follow-up with outpatient provider. Continue to take Lexapro, Strattera, and Trazodone as currently prescribed by PCP. Stop Buspar. Discussed plan with Psychiatrist, Dr. Cassy Rausch and ED physician, Dr. [facility]. Telepsychiatry will sign off. Thank you for allowing us to participate in the care of this patient. Please call if anything more is required. Electronically signed by ROOSEVELT Deluca CNP on 1/20/2023 at 1:47 PM.     Pete Royal was evaluated through a synchronous (real-time) audio-video encounter. The patient (and/or guardian if applicable) is aware that this is a billable service, which includes applicable co-pays. This virtual visit was conducted with patient's (and/or legal guardian's) consent. Patient identification was verified, and a caregiver was present when appropriate. The patient was located at [place] [address] [facility] [address] [place] [address] [address]: [phone]  The provider was located at Home (City/State): [place]     Total time spent on this encounter: Not billed by time    --ROOSEVELT Deluca CNP on 1/20/2023 at 1:46 PM    An electronic signature was used to authenticate this note.

## 2023-01-20 NOTE — VIRTUAL HEALTH
[place] Consult to Tele-Psych  Consult performed by: MAGGIE Lewis  Consult ordered by: Brenna Ascencio MD      Behavioral Health Crisis Assessment    Chief Complaint: pt reports experiencing VH of \"a man on a horse\"  and \"a man on a bike\" only in her right eye for the last week since starting Buspar on 1/12/22. She reports stopping the medication after a couple days when the symptoms started but says the [facility] has remained and it is \"very scary and frustrating\"     Provisional Diagnosis: Hallucinations    Voluntary or Involuntary Status: Voluntary     C-SSRS Current Suicide Risk (High, Moderate, Low): Low risk as evidenced by assessment and collateral information     Suicide Attempts: pt denies     Self-Injurious Behaviors: pt denies     Risk Factors: currently not on medications since stopping Buspar     Protective Factors: significant other Stewart Castorena), hopeful about a new job     Psych Hx & Tx: pt reports taking Xanax for the last several years for anxiety and panic attacks but that her psychiatric provider, Dr. Edison Coe, recently switched her to Buspar because she was concerned about her being on Xanax for so long. Substance Use Hx & Tx: pt denies     Violence Hx: pt denies     Access to Weapons: pt denies     Trauma/Abuse Hx: pt denies     Legal Hx: pt denies     Living Situation: lives with with her significant other, Bo Lange     Employment: pt reports recently leaving her job of 18 years at [employer] because she felt she wasn't making enough money, but is currently looking for other employment     Education:     Brief Clinical Summary: Ms. Imani Panda is a 57year old female presenting to the ED with chief complaint of [facility] in her right eye of \"a man on a bike\" and [place] man on a horse\". She reports the symptoms started last week after she was taken off Xanax \"cold turkey\" and put on Buspar by her psychiatric provider.  She stopped taking the Buspar after a couple days due to the symptoms, but reports still experiencing the [facility], which has been very scary and frustrating for her. She denies any other psychiatric hx or hospitalizations and says her anxiety and panic attacks were well managed by the Xanax that she was on \"for many years\". She denies SI/HI. She is alert and oriented x4. She is a  but she currently has a supportive significant other, Jairon Fernandez, who is with her in the room and reports to writer that he is the reason she came in to the ED because he was worried about her. They both express concern about the [facility] and concerned about her not currently being on a psychotropic medication to manage her anxiety and panic attacks and want to speak to someone to discuss medications. Writer explained that an order will be placed for the telepsych NP to evaluate further. They verbalized understanding and agreement. Disposition: escalation order placed to NP for further assessment     Safety Plan Created or Reviewed: n/a- pt to be seen by NP for further assessment      Luis Shore was evaluated through a synchronous (real-time) audio-video encounter. The patient (and/or guardian if applicable) is aware that this is a billable service, which includes applicable co-pays. This virtual visit was conducted with patient's (and/or legal guardian's) consent. Patient identification was verified, and a caregiver was present when appropriate. The patient was located at Facility (Appt Department): [address] [place] [address] Road: [phone]  The provider was located at Home (City/State): [place], [place]     Total time spent on this encounter: Not billed by time    --MAGGIE Benton on 1/20/2023 at 12:21 PM    An electronic signature was used to authenticate this note.

## 2023-01-21 LAB
AMMONIA: 30 UMOL/L (ref 11–51)
TSH REFLEX: 2.61 UIU/ML (ref 0.27–4.2)

## 2023-01-21 PROCEDURE — 6370000000 HC RX 637 (ALT 250 FOR IP): Performed by: HOSPITALIST

## 2023-01-21 PROCEDURE — 6360000002 HC RX W HCPCS: Performed by: INTERNAL MEDICINE

## 2023-01-21 PROCEDURE — 2580000003 HC RX 258: Performed by: INTERNAL MEDICINE

## 2023-01-21 PROCEDURE — 1200000000 HC SEMI PRIVATE

## 2023-01-21 PROCEDURE — 6370000000 HC RX 637 (ALT 250 FOR IP): Performed by: INTERNAL MEDICINE

## 2023-01-21 RX ORDER — METOPROLOL SUCCINATE 50 MG/1
50 TABLET, EXTENDED RELEASE ORAL DAILY
Status: DISCONTINUED | OUTPATIENT
Start: 2023-01-21 | End: 2023-01-22 | Stop reason: HOSPADM

## 2023-01-21 RX ORDER — TIZANIDINE 4 MG/1
12 TABLET ORAL NIGHTLY
Status: DISCONTINUED | OUTPATIENT
Start: 2023-01-21 | End: 2023-01-22 | Stop reason: HOSPADM

## 2023-01-21 RX ORDER — NICOTINE 21 MG/24HR
1 PATCH, TRANSDERMAL 24 HOURS TRANSDERMAL DAILY
Qty: 10 PATCH | Refills: 3 | Status: CANCELLED | OUTPATIENT
Start: 2023-01-21 | End: 2023-01-31

## 2023-01-21 RX ORDER — TRAZODONE HYDROCHLORIDE 100 MG/1
200 TABLET ORAL NIGHTLY
Status: DISCONTINUED | OUTPATIENT
Start: 2023-01-21 | End: 2023-01-22 | Stop reason: HOSPADM

## 2023-01-21 RX ORDER — ATORVASTATIN CALCIUM 40 MG/1
40 TABLET, FILM COATED ORAL NIGHTLY
Status: DISCONTINUED | OUTPATIENT
Start: 2023-01-21 | End: 2023-01-22 | Stop reason: HOSPADM

## 2023-01-21 RX ADMIN — METOPROLOL SUCCINATE 50 MG: 50 TABLET, EXTENDED RELEASE ORAL at 10:15

## 2023-01-21 RX ADMIN — ATORVASTATIN CALCIUM 40 MG: 40 TABLET, FILM COATED ORAL at 21:43

## 2023-01-21 RX ADMIN — TIZANIDINE 12 MG: 4 TABLET ORAL at 21:43

## 2023-01-21 RX ADMIN — SODIUM CHLORIDE, PRESERVATIVE FREE 10 ML: 5 INJECTION INTRAVENOUS at 08:18

## 2023-01-21 RX ADMIN — ACETAMINOPHEN 650 MG: 325 TABLET ORAL at 03:46

## 2023-01-21 RX ADMIN — AMLODIPINE BESYLATE 5 MG: 5 TABLET ORAL at 08:14

## 2023-01-21 RX ADMIN — TRAZODONE HYDROCHLORIDE 200 MG: 100 TABLET ORAL at 21:43

## 2023-01-21 RX ADMIN — ENOXAPARIN SODIUM 40 MG: 100 INJECTION SUBCUTANEOUS at 08:13

## 2023-01-21 RX ADMIN — SODIUM CHLORIDE, PRESERVATIVE FREE 10 ML: 5 INJECTION INTRAVENOUS at 21:43

## 2023-01-21 ASSESSMENT — PAIN DESCRIPTION - DESCRIPTORS: DESCRIPTORS: ACHING

## 2023-01-21 ASSESSMENT — PAIN SCALES - GENERAL
PAINLEVEL_OUTOF10: 0
PAINLEVEL_OUTOF10: 0
PAINLEVEL_OUTOF10: 3

## 2023-01-21 ASSESSMENT — PAIN DESCRIPTION - ORIENTATION: ORIENTATION: MID

## 2023-01-21 ASSESSMENT — PAIN DESCRIPTION - LOCATION: LOCATION: HEAD

## 2023-01-21 NOTE — PROGRESS NOTES
Hospitalist Progress Note      Name:  Sheryl Biggs /Age/Sex: 1960  (57 y.o. female)   MRN & CSN:  [id number] & [id number] Admission Date/Time: 2023 11:11 AM   Location:  5506/5506-01 PCP: Yovani Lorenzo, ROOSEVELT-CNP, ROOSEVELT - NP         Hospital Day: 2    Assessment and Plan:     57 y.o. female who presents with complaints of visual hallucinations on the right eye. No changes in visual acuity. Patient has generalized anxiety disorder and was started on BuSpar on . Patient states that 2 days after she started taking this medication started having visual hallucinations which she describes as man on a horse/man on a bicycle. This hallucinations are only present in her right eye, constant. Patient has stopped taking BuSpar on 1/15. In spite of its half-life of 5 days, patient continues to have the hallucinations. Patient has multiple other complaints-blurry vision in her left eye, tinnitus. New onset visual hallucination : possible drug induced ? Neurochecks every 4 hourly  -Blood pressure control, resume on toprol XL home medication   Neurology has been consulted, further work-up per neurology recommendations  MRI of brain non acute   Consider stop atomoxetine which could be the trigger   Check TSH   Serum ammonia wnl  May be to do EEG to r/o focal seizure   Psychiatrist saw the patient , cleared by them         Diet ADULT DIET; Regular   DVT Prophylaxis [x] Lovenox, []  Heparin, [] SCDs, [] Ambulation   GI Prophylaxis [] PPI,  [] H2 Blocker,  [] Carafate,  [] Diet/Tube Feeds   Code Status Full Code   Disposition Patient requires continued admission due to    MDM [] Low, [] Moderate,[]  High  Patient's risk as above due to      History of Present Illness: The patient was seen and examined at bedside  Reports black dot seeing by R eye  Follow TSH     Objective:      Intake/Output Summary (Last 24 hours) at 2023 0909  Last data filed at 2023 0818  Gross per 24 hour Intake 190 ml   Output --   Net 190 ml      Vitals:   Vitals:    01/21/23 0814   BP: (!) 178/84   Pulse:    Resp:    Temp:    SpO2:      Physical Exam:   GEN Awake.  Alert , not in respiratory distress, not in pain  HEENT: PEERLA, , supple neck,   Chest: air entry equal bilaterally, no wheezing or crepitation  Heart: S1 and S2 heard, no murmur, no gallop or rub, regular rate  Abdomen: soft, ND , Nt, +BS  Extremities: no cyanosis, tenderness or erythema, peripheral pulses audible  Neurology: alert, oriented x3, able to move 4 limbs    Medications:   Medications:    metoprolol succinate  50 mg Oral Daily    atorvastatin  40 mg Oral Nightly    nicotine  1 patch TransDERmal Once    sodium chloride flush  5-40 mL IntraVENous 2 times per day    enoxaparin  40 mg SubCUTAneous Daily    amLODIPine  5 mg Oral Daily      Infusions:    sodium chloride       PRN Meds: sodium chloride flush, 5-40 mL, PRN  sodium chloride, , PRN  ondansetron, 4 mg, Q8H PRN   Or  ondansetron, 4 mg, Q6H PRN  polyethylene glycol, 17 g, Daily PRN  acetaminophen, 650 mg, Q6H PRN   Or  acetaminophen, 650 mg, Q6H PRN          Electronically signed by Solange Oreilly MD on 1/21/2023 at 9:09 AM

## 2023-01-21 NOTE — PROGRESS NOTES
4 Eyes Admission Assessment      I agree as the admission nurse that 2 RN's have performed a thorough Head to Toe Skin Assessment on the patient. ALL assessment sites listed below have been assessed on admission. Areas assessed by both nurses:   [x]   Head, Face, and Ears   [x]   Shoulders, Back, and Chest  [x]   Arms, Elbows, and Hands   [x]   Coccyx, Sacrum, and Ischium  [x]   Legs, Feet, and Heels                        Does the Patient have Skin Breakdown?   No         Faustino Prevention initiated:  No   Wound Care Orders initiated:  No      [facility] nurse consulted for Pressure Injury (Stage 3,4, Unstageable, DTI, NWPT, and Complex wounds) or Faustion score 18 or lower:  No       Nurse 1 eSignature: Electronically signed by Army Marcos RN on 1/20/23 at 11:30 PM EST     **SHARE this note so that the co-signing nurse is able to place an eSignature**     Nurse 2 eSignature: Electronically signed by Ernesto Cabello RN on 1/21/23 at 12:31 AM EST

## 2023-01-21 NOTE — H&P
Hospital Medicine History & Physical      PCP: Jearl Essex, APRN-CNP, APRN - NP    Date of Admission: 1/20/2023    Date of Service: Pt seen/examined on 1/20/2023 and Admitted to Inpatient with expected LOS greater than two midnights due to medical therapy. Chief Complaint: Hallucinations      History Of Present Illness:    57 y.o. female who presents with complaints of visual hallucinations on the right eye. No changes in visual acuity. Patient has generalized anxiety disorder and was started on BuSpar on 1/12. Patient states that 2 days after she started taking this medication started having visual hallucinations which she describes as man on a horse/man on a bicycle. This hallucinations are only present in her right eye, constant. Patient has stopped taking BuSpar on 1/15. In spite of its half-life of 5 days, patient continues to have the hallucinations. Patient has multiple other complaints-blurry vision in her left eye, tinnitus. Patient denies any focal motor or sensory deficits, fever, chills, nausea, vomiting, chest or abdominal pain, shortness of breath, urinary symptoms or changes in bowel habits. Patient denies similar symptoms in the past.    ED physician has consulted psychiatry and patient had a virtual encounter. Patient has been excluded from psychiatric causes for the current symptoms.     Neurology was consulted by ED physician and recommended admission and getting MRI of the brain    Past Medical History:          Diagnosis Date    Anxiety     Arthritis     Hepatitis C     Hyperlipidemia     Hypertension     Reflux esophagitis        Past Surgical History:          Procedure Laterality Date    BLADDER SURGERY Bilateral 9/25/2022    CYSTOSCOPY RETROGRADE PYELOGRAM STENT INSERTION BILATERAL [facility]    LEFT CAROTID ENDARTERECTOMY WITH INTRAOPERATIVE DUPLEX SCAN performed by Arnold Quiros MD at [address]      left arm    JOINT REPLACEMENT      knee       Medications Prior to Admission:      Prior to Admission medications    Medication Sig Start Date End Date Taking? Authorizing Provider   busPIRone (BUSPAR) 5 MG tablet Take 5 mg by mouth 3 times daily   Yes Historical Provider, MD   atomoxetine (STRATTERA) 60 MG capsule Take 60 mg by mouth daily 9/13/22   Historical Provider, MD   mesalamine (DELZICOL) 800 MG TBEC TBEC tablet Take 800 mg by mouth 3 times daily 9/22/22   Historical Provider, MD   simvastatin (ZOCOR) 20 MG tablet Take 20 mg by mouth nightly 6/10/20 6/10/21  Historical Provider, MD   tiZANidine (ZANAFLEX) 4 MG capsule Take 12 mg by mouth at bedtime    Historical Provider, MD   aspirin 81 MG chewable tablet Take 81 mg by mouth daily    Historical Provider, MD   amLODIPine (NORVASC) 5 MG tablet Take 5 mg by mouth daily    Historical Provider, MD   traZODone (DESYREL) 100 MG tablet Take 200 mg by mouth nightly    Historical Provider, MD   escitalopram (LEXAPRO) 10 MG tablet Take 10 mg by mouth daily    Historical Provider, MD   pantoprazole (PROTONIX) 40 MG tablet Take 40 mg by mouth 2 times daily    Historical Provider, MD       Allergies:  Nut  [macadamia nut oil], Peanut (diagnostic), Penicillins, Buspirone hcl, and Hydrochlorothiazide    Social History:      The patient currently lives at home    TOBACCO:   reports that she has been smoking cigarettes. She has a 23.00 pack-year smoking history. She has never used smokeless tobacco.  ETOH:   reports current alcohol use. E-cigarette/Vaping       Questions Responses    E-cigarette/Vaping Use Never User    Start Date     Passive Exposure     Quit Date     Counseling Given     Comments               Family History:    Reviewed and negative in regards to presenting illness/complaint. No family history on file. REVIEW OF SYSTEMS COMPLETED:   Pertinent positives as noted in the HPI.  All other systems reviewed and negative. PHYSICAL EXAM PERFORMED:    BP (!) 172/98   Pulse (!) 104   Temp 98.1 °F (36.7 °C) (Oral)   Resp 16   Ht 5' 4\" (1.626 m)   Wt 142 lb 11.2 oz (64.7 kg)   SpO2 100%   BMI 24.49 kg/m²     General appearance:  No apparent distress, appears stated age and cooperative. Anxious appearing  HEENT:  Normal cephalic, atraumatic without obvious deformity. Pupils equal, round, and reactive to light. Extra ocular muscles intact. Conjunctivae/corneas clear. Neck: Supple, with full range of motion. No jugular venous distention. Trachea midline. Respiratory:  Normal respiratory effort. Clear to auscultation, bilaterally without Rales/Wheezes/Rhonchi. Cardiovascular:  Regular rate and rhythm with normal S1/S2 without murmurs, rubs or gallops. Abdomen: Soft, non-tender, non-distended with normal bowel sounds. Musculoskeletal:  No clubbing, cyanosis or edema bilaterally. Full range of motion without deformity. Skin: Skin color, texture, turgor normal.  No rashes or lesions. Neurologic:  Neurovascularly intact without any focal sensory/motor deficits. Cranial nerves: II-XII intact, grossly non-focal.  Normal visual acuity and peripheral vision  Psychiatric:  Alert and oriented, thought content appropriate, normal insight  Capillary Refill: Brisk,3 seconds, normal  Peripheral Pulses: +2 palpable, equal bilaterally       Labs:     Recent Labs     01/20/23  1136   WBC 8.6   HGB 13.7   HCT 41.4        Recent Labs     01/20/23  1136      K 4.1      CO2 25   BUN 16   CREATININE 1.0   CALCIUM 9.2     Recent Labs     01/20/23  1136   AST 29   ALT 25   BILITOT <0.2   ALKPHOS 73     No results for input(s): INR in the last 72 hours. No results for input(s): Beatrice Height in the last 72 hours.     Urinalysis:      Lab Results   Component Value Date/Time    NITRU Negative 01/20/2023 03:38 PM    WBCUA  09/24/2022 06:43 PM    BACTERIA 3+ 09/24/2022 06:43 PM    RBCUA 3-4 09/24/2022 06:43 PM BLOODU Negative 01/20/2023 03:38 PM    SPECGRAV 1.010 01/20/2023 03:38 PM    GLUCOSEU Negative 01/20/2023 03:38 PM       Radiology:     CXR: I have reviewed the CXR with the following interpretation: NI  EKG:  I have reviewed the EKG with the following interpretation: NA    CT Head W/O Contrast   Final Result      No acute intracranial abnormality. MRI BRAIN W WO CONTRAST    (Results Pending)       Consults:    IP CONSULT TO TELE-PSYCH (SOCIAL WORK ONLY)  IP CONSULT TO TELE-PSYCH PROVIDER  IP CONSULT TO NEUROLOGY  IP CONSULT TO HOSPITALIST  IP CONSULT TO NEUROLOGY    ASSESSMENT:    Active Hospital Problems    Diagnosis Date Noted    Hallucination, drug-induced [facility]) [F19.951] 01/20/2023     Priority: Medium    Hallucination, visual [R44.1] 01/20/2023     Priority: Medium   #New onset monocular visual hallucinations  #Essential hypertension  #Hyperlipidemia  #General anxiety disorder    PLAN:  -Neurochecks every 4 hourly  -Blood pressure control  -Neurology has been consulted, further work-up per neurology recommendations  -Follow MRI brain with and without contrast  -Continue her home medications appropriately  -Supportive therapy    DVT Prophylaxis: Lovenox  Diet: ADULT DIET; Regular  Code Status: Full Code    PT/OT Eval Status: As tolerated with fall precautions   Dispo -GMF with telemetry       Rodney Claros MD    Thank you ROOSEVELT Scruggs-CNP, ROOSEVELT - NP for the opportunity to be involved in this patient's care. If you have any questions or concerns please feel free to contact me at [phone].

## 2023-01-21 NOTE — PROGRESS NOTES
Aox4    VSS    RA    Pt states seeing horses on a bike in the R eye only    Pt states this morning @ 6:45 she only sees a Black dot now.     Denies pain    Voids per bathroom   No assistive devices    No complaints, just worries about hallucinations

## 2023-01-21 NOTE — ED PROVIDER NOTES
ED Attending Attestation Note     Date of evaluation: 1/20/2023    This patient was seen by the resident. I have seen and examined the patient, agree with the workup, evaluation, management and diagnosis. The care plan has been discussed. I have reviewed the ECG and concur with the resident's interpretation. My assessment reveals very pleasant 58-year-old woman with history of HTN, HCV, anxiety presenting with hallucinations. Patient reports that she has been following with her primary care doctor for anxiety and was started on BuSpar about 2 weeks ago and reports that after several days of taking the medication, she began having hallucinations described as a man riding a horse that were visible only in her right thigh. Denies any head trauma, headaches, fevers, neck pain or stiffness. Denies visual changes other than the hallucination in right eye with no eye pain. Denies prior episodes of hallucinations in the past.  Denies any auditory hallucinations, suicidal or homicidal ideation or plan. On exam, patient is well-appearing seated on stretcher in no acute distress with family at bedside. Patient with nonfocal neurologic exam with no gross cranial nerve abnormalities with normal visual fields. 5/5 strength in all extremities and sensation intact to light touch. Normal FNF and HTS. Noncontrasted head CT showed no acute abnormalities. Patient denying any SI/HI or auditory hallucinations. Telepsychiatry was consulted and evaluated patient with recommendation for outpatient psychiatry follow-up and short course of as needed Zyprexa at night for anxiety. Given unilateral hallucinations only present in left eye with new onset, case was discussed with neurology with recommendation for admission and MRI. Results of tests and discussion with consultants were discussed with patient and family member at bedside with patient amenable for further inpatient work-up.   Patient care signed out to inpatient team.     Pinky Gudino MD  01/21/23 0001

## 2023-01-21 NOTE — PLAN OF CARE
Problem: Discharge Planning  Goal: Discharge to home or other facility with appropriate resources  Flowsheets (Taken 1/21/2023 0241)  Discharge to home or other facility with appropriate resources: Identify barriers to discharge with patient and caregiver  Note: Standard safety precautions implemented. Pt sleep with no problems. Identified barriers to discharge. Pt lives at home with boyfriend. Believes medication plays a part of hallucinations.

## 2023-01-22 VITALS
RESPIRATION RATE: 18 BRPM | TEMPERATURE: 98.4 F | BODY MASS INDEX: 24.36 KG/M2 | DIASTOLIC BLOOD PRESSURE: 73 MMHG | HEIGHT: 64 IN | OXYGEN SATURATION: 97 % | SYSTOLIC BLOOD PRESSURE: 115 MMHG | HEART RATE: 71 BPM | WEIGHT: 142.7 LBS

## 2023-01-22 LAB
A/G RATIO: 1.4 (ref 1.1–2.2)
ALBUMIN SERPL-MCNC: 4 G/DL (ref 3.4–5)
ALP BLD-CCNC: 68 U/L (ref 40–129)
ALT SERPL-CCNC: 19 U/L (ref 10–40)
ANION GAP SERPL CALCULATED.3IONS-SCNC: 11 MMOL/L (ref 3–16)
AST SERPL-CCNC: 23 U/L (ref 15–37)
BASOPHILS ABSOLUTE: 0.1 K/UL (ref 0–0.2)
BASOPHILS RELATIVE PERCENT: 0.8 %
BILIRUB SERPL-MCNC: 0.3 MG/DL (ref 0–1)
BUN BLDV-MCNC: 21 MG/DL (ref 7–20)
CALCIUM SERPL-MCNC: 9.5 MG/DL (ref 8.3–10.6)
CHLORIDE BLD-SCNC: 104 MMOL/L (ref 99–110)
CO2: 24 MMOL/L (ref 21–32)
CREAT SERPL-MCNC: 1 MG/DL (ref 0.6–1.2)
EOSINOPHILS ABSOLUTE: 0.2 K/UL (ref 0–0.6)
EOSINOPHILS RELATIVE PERCENT: 2 %
GFR SERPL CREATININE-BSD FRML MDRD: >60 ML/MIN/{1.73_M2}
GLUCOSE BLD-MCNC: 81 MG/DL (ref 70–99)
HCT VFR BLD CALC: 44.3 % (ref 36–48)
HEMOGLOBIN: 14.3 G/DL (ref 12–16)
LYMPHOCYTES ABSOLUTE: 1.5 K/UL (ref 1–5.1)
LYMPHOCYTES RELATIVE PERCENT: 18.7 %
MCH RBC QN AUTO: 28.5 PG (ref 26–34)
MCHC RBC AUTO-ENTMCNC: 32.4 G/DL (ref 31–36)
MCV RBC AUTO: 88 FL (ref 80–100)
MONOCYTES ABSOLUTE: 0.5 K/UL (ref 0–1.3)
MONOCYTES RELATIVE PERCENT: 6.9 %
NEUTROPHILS ABSOLUTE: 5.6 K/UL (ref 1.7–7.7)
NEUTROPHILS RELATIVE PERCENT: 71.6 %
PDW BLD-RTO: 15.4 % (ref 12.4–15.4)
PLATELET # BLD: 223 K/UL (ref 135–450)
PMV BLD AUTO: 7.6 FL (ref 5–10.5)
POTASSIUM REFLEX MAGNESIUM: 3.8 MMOL/L (ref 3.5–5.1)
RBC # BLD: 5.03 M/UL (ref 4–5.2)
SODIUM BLD-SCNC: 139 MMOL/L (ref 136–145)
TOTAL PROTEIN: 6.9 G/DL (ref 6.4–8.2)
WBC # BLD: 7.8 K/UL (ref 4–11)

## 2023-01-22 PROCEDURE — 85025 COMPLETE CBC W/AUTO DIFF WBC: CPT

## 2023-01-22 PROCEDURE — 6370000000 HC RX 637 (ALT 250 FOR IP): Performed by: HOSPITALIST

## 2023-01-22 PROCEDURE — 6370000000 HC RX 637 (ALT 250 FOR IP): Performed by: INTERNAL MEDICINE

## 2023-01-22 PROCEDURE — 2580000003 HC RX 258: Performed by: INTERNAL MEDICINE

## 2023-01-22 PROCEDURE — 36415 COLL VENOUS BLD VENIPUNCTURE: CPT

## 2023-01-22 PROCEDURE — 6360000002 HC RX W HCPCS: Performed by: INTERNAL MEDICINE

## 2023-01-22 PROCEDURE — 80053 COMPREHEN METABOLIC PANEL: CPT

## 2023-01-22 RX ORDER — METOPROLOL SUCCINATE 50 MG/1
25 TABLET, EXTENDED RELEASE ORAL DAILY
Qty: 15 TABLET | Refills: 0 | Status: SHIPPED | OUTPATIENT
Start: 2023-01-23 | End: 2023-02-07

## 2023-01-22 RX ADMIN — SODIUM CHLORIDE, PRESERVATIVE FREE 10 ML: 5 INJECTION INTRAVENOUS at 08:28

## 2023-01-22 RX ADMIN — AMLODIPINE BESYLATE 5 MG: 5 TABLET ORAL at 08:28

## 2023-01-22 RX ADMIN — METOPROLOL SUCCINATE 50 MG: 50 TABLET, EXTENDED RELEASE ORAL at 08:27

## 2023-01-22 RX ADMIN — ENOXAPARIN SODIUM 40 MG: 100 INJECTION SUBCUTANEOUS at 08:28

## 2023-01-22 ASSESSMENT — PAIN SCALES - GENERAL
PAINLEVEL_OUTOF10: 0
PAINLEVEL_OUTOF10: 0

## 2023-01-22 NOTE — PLAN OF CARE
Problem: Discharge Planning  Goal: Discharge to home or other facility with appropriate resources  1/22/2023 1039 by Katie Hoyt RN  Outcome: Completed  1/21/2023 2206 by Loretta Huddleston RN  Outcome: Progressing

## 2023-01-22 NOTE — PROGRESS NOTES
Reason for Admission: Hallucination, visual     Major Shift Events: Pt reports hallucination is more so like a dark shadow in the R eye and explains that she now has no hallucination or visual changes by morning.      Systems Review   Neuro:    A&O: x4   NIHSS: 0  Sedation/RASS   RASS: 0   Pain: pt denies     Cardiac   Rhythm: NSR    Gtts: N/A    Pulmonary   O2 Modality: RA    GI/    Last BM: CHAVEZ   I/O:    01/20 0700 01/21 0659 01/21 0700 01/22 0659   P.O. (mL/kg/hr) 60 460 (0.3)   I.V. (mL/kg)  10 (0.2)   Total Intake(mL/kg) 60 (0.9) 470 (7.3)   Net +60 +470        Urine Occurrence 3 x 8 x   Stool Occurrence  1 x   Emesis Occurrence  0 x      NPO/PO/TF/TPN/rate(goal): PO, regular     Hematology   Blood Products: N/A   VTE Prophylaxis/Anticoagulation: Lovenox    H/H:    Latest Reference Range & Units Most Recent   Hemoglobin Quant 12.0 - 16.0 g/dL 13.7  1/20/23 11:36   Hematocrit 36.0 - 48.0 % 41.4  1/20/23 11:36       Infectious Disease   Culture results/pending: N/A   ABX: N/A   Isolation: None    Skin: C/D/I    Lines/tubes: PIV 22 R wrist    Lab or unit collect: Lab    Mobility    PT/OT: up ad nanda

## 2023-01-22 NOTE — PROGRESS NOTES
Discharge instructions reviewed with the pt at this time, IV removed and prescription has been sent to her [facility] [facility] [place]. All questions answered. Pt signed discharge papers, and voiced understanding. Copy of discharge papers given to the pt and a copy placed in the pt's paper chart. Pt left in stable condition with her  in private transportation.   Jaymie Jones RN

## 2023-01-22 NOTE — DISCHARGE SUMMARY
Discharge Summary    Name:  Kenji Voss /Age/Sex: 1960  (57 y.o. female)   MRN & CSN:  [id number] & [id number] Admission Date/Time: 2023 11:11 AM   Attending:  Daljit Javier MD Discharging Physician: Daljit Javier MD     Hospital Course:   Kenji Voss is a 57 y.o.  female  who presents with Hallucination, visual    57 y.o. female who presents with complaints of visual hallucinations on the right eye. No changes in visual acuity. Patient has generalized anxiety disorder and was started on BuSpar on . Patient states that 2 days after she started taking this medication started having visual hallucinations which she describes as man on a horse/man on a bicycle. This hallucinations are only present in her right eye, constant. Patient has stopped taking BuSpar on 1/15. In spite of its half-life of 5 days, patient continues to have the hallucinations. Patient has multiple other complaints-blurry vision in her left eye, tinnitus. New onset visual hallucination : likely  drug induced  due to atomoxetine  Get resolved after holding the medication  Recommend follow up with psychiatrist outpatient      -Blood pressure control, resume on amlodipine and toprol XL 25 mg daily   Neurology has been consulted, further work-up per neurology recommendations  MRI of brain non acute   Consider stop atomoxetine which could be the trigger   Check TSH wnl  Serum ammonia wnl  No plan to do EEG per neurology   Psychiatrist saw the patient , cleared by them   If the symptom  recur again, recommend see eye doc     The patient expressed appropriate understanding of and agreement with the discharge recommendations, medications, and plan.      Consults this admission:  IP CONSULT TO TELE-PSYCH (SOCIAL WORK ONLY)  IP CONSULT TO TELE-PSYCH PROVIDER  IP CONSULT TO NEUROLOGY  IP CONSULT TO HOSPITALIST  IP CONSULT TO NEUROLOGY    Discharge Instruction:   Follow up appointments:   Primary care physician:  within 1 weeks    Diet:  regular diet   Activity: activity as tolerated  Disposition: Discharged to:   [x]Home, [][facility], []SNF, []Acute Rehab, []Hospice   Condition on discharge: Stable    Discharge Medications:        Medication List        START taking these medications      metoprolol succinate 50 MG extended release tablet  Commonly known as: TOPROL XL  Take 0.5 tablets by mouth daily for 15 days  Start taking on: January 23, 2023            Alycia Augustine taking these medications      amLODIPine 5 MG tablet  Commonly known as: NORVASC     aspirin 81 MG chewable tablet     busPIRone 5 MG tablet  Commonly known as: BUSPAR     escitalopram 10 MG tablet  Commonly known as: LEXAPRO     mesalamine 800 MG Tbec TBEC tablet  Commonly known as: DELZICOL     pantoprazole 40 MG tablet  Commonly known as: PROTONIX     simvastatin 20 MG tablet  Commonly known as: ZOCOR     tiZANidine 4 MG capsule  Commonly known as: ZANAFLEX     traZODone 100 MG tablet  Commonly known as: DESYREL            STOP taking these medications      atomoxetine 60 MG capsule  Commonly known as: STRATTERA               Where to Get Your Medications        These medications were sent to [facility] [phone] [facility] [phone]  [address] [place], [place] [id number]      Phone: [phone]   metoprolol succinate 50 MG extended release tablet         Objective Findings at Discharge:   /73   Pulse 71   Temp 98.4 °F (36.9 °C) (Oral)   Resp 18   Ht 5' 4\" (1.626 m)   Wt 142 lb 11.2 oz (64.7 kg)   SpO2 97%   BMI 24.49 kg/m²            PHYSICAL EXAM   GEN    Awake.  Alert , not in respiratory distress, not in pain  HEENT: PEERLA, , supple neck,   Chest: air entry equal bilaterally, no wheezing or crepitation  Heart: S1 and S2 heard, no murmur, no gallop or rub, regular rate  Abdomen: soft, ND , Nt, +BS  Extremities: no cyanosis, tenderness or erythema, peripheral pulses audible  Neurology: alert, oriented x3, able to move 4 limbs    BMP/CBC  Recent Labs     01/20/23  1136 01/22/23  0759    139   K 4.1 3.8    104   CO2 25 24   BUN 16 21*   CREATININE 1.0 1.0   WBC 8.6 7.8   HCT 41.4 44.3    223       IMAGING:      Discharge Time of 35 minutes    Electronically signed by Anuel Cha MD on 1/22/2023 at 9:47 AM

## 2023-01-22 NOTE — CARE COORDINATION
CM aware of dc order. Pt from home, IPTA. Cm spoke with nurse, Mak Kaur. No needs for dc today. Contact CM if needs arise.

## 2023-01-22 NOTE — PROGRESS NOTES
Assessment complete, see flow sheet. The pt states her vision is back to normal and denies seeing any hallucinations or black dots in her visual field. The pt is hoping to go home today. The pt currently denies any needs this morning, call light is in reach. I will continue to follow throughout the shift.   Marie Pascual RN

## 2023-02-09 ENCOUNTER — HOSPITAL ENCOUNTER (OUTPATIENT)
Dept: ULTRASOUND IMAGING | Age: 63
Discharge: HOME | End: 2023-02-09
Payer: COMMERCIAL

## 2023-02-09 DIAGNOSIS — N13.30 HYDRONEPHROSIS, UNSPECIFIED HYDRONEPHROSIS TYPE: ICD-10-CM

## 2023-02-09 PROCEDURE — 76770 US EXAM ABDO BACK WALL COMP: CPT

## 2023-02-23 ENCOUNTER — PROCEDURE VISIT (OUTPATIENT)
Dept: VASCULAR SURGERY | Age: 63
End: 2023-02-23
Payer: COMMERCIAL

## 2023-02-23 DIAGNOSIS — I65.23 CAROTID ATHEROSCLEROSIS, BILATERAL: ICD-10-CM

## 2023-02-23 PROCEDURE — 93880 EXTRACRANIAL BILAT STUDY: CPT | Performed by: SURGERY

## 2023-02-27 ENCOUNTER — TELEPHONE (OUTPATIENT)
Dept: VASCULAR SURGERY | Age: 63
End: 2023-02-27

## 2023-02-27 DIAGNOSIS — I65.23 CAROTID ATHEROSCLEROSIS, BILATERAL: Primary | ICD-10-CM

## 2023-02-27 NOTE — TELEPHONE ENCOUNTER
Discussed results of carotid duplex with patient which shows stable carotid artery disease with no significant progression. Plan to repeat carotid duplex in 1 year for continued surveillance.     Electronically signed by ROOSEVELT Gonzalez CNP on 2/27/2023 at 11:18 AM

## 2023-12-04 ENCOUNTER — TELEPHONE (OUTPATIENT)
Dept: SURGERY | Age: 63
End: 2023-12-04

## 2023-12-04 NOTE — TELEPHONE ENCOUNTER
LM to schedule CDS in the [facility] office on or after 2/23/24. Dr Aaliyah Calderon will call with the results.

## 2023-12-06 ENCOUNTER — APPOINTMENT (OUTPATIENT)
Dept: CT IMAGING | Age: 63
DRG: 683 | End: 2023-12-06
Payer: MEDICARE

## 2023-12-06 ENCOUNTER — HOSPITAL ENCOUNTER (INPATIENT)
Age: 63
LOS: 3 days | Discharge: HOME | DRG: 683 | End: 2023-12-09
Attending: STUDENT IN AN ORGANIZED HEALTH CARE EDUCATION/TRAINING PROGRAM | Admitting: STUDENT IN AN ORGANIZED HEALTH CARE EDUCATION/TRAINING PROGRAM
Payer: MEDICARE

## 2023-12-06 ENCOUNTER — APPOINTMENT (OUTPATIENT)
Dept: GENERAL RADIOLOGY | Age: 63
DRG: 683 | End: 2023-12-06
Payer: MEDICARE

## 2023-12-06 DIAGNOSIS — R07.9 CHEST PAIN, UNSPECIFIED TYPE: Primary | ICD-10-CM

## 2023-12-06 DIAGNOSIS — R07.89 ATYPICAL CHEST PAIN: ICD-10-CM

## 2023-12-06 DIAGNOSIS — R00.0 TACHYCARDIA: ICD-10-CM

## 2023-12-06 DIAGNOSIS — I21.4 NSTEMI (NON-ST ELEVATED MYOCARDIAL INFARCTION) (HCC): ICD-10-CM

## 2023-12-06 LAB
ANION GAP SERPL CALCULATED.3IONS-SCNC: 14 MMOL/L (ref 3–16)
BACTERIA URNS QL MICRO: ABNORMAL /HPF
BASE EXCESS BLDV CALC-SCNC: 2.2 MMOL/L (ref -2–3)
BASOPHILS # BLD: 0 K/UL (ref 0–0.2)
BASOPHILS NFR BLD: 0.3 %
BILIRUB UR QL STRIP.AUTO: NEGATIVE
BUN SERPL-MCNC: 21 MG/DL (ref 7–20)
CALCIUM SERPL-MCNC: 9.8 MG/DL (ref 8.3–10.6)
CHLORIDE SERPL-SCNC: 97 MMOL/L (ref 99–110)
CLARITY UR: CLEAR
CO2 BLDV-SCNC: 29 MMOL/L
CO2 SERPL-SCNC: 26 MMOL/L (ref 21–32)
COHGB MFR BLDV: 2.4 % (ref 0–1.5)
COLOR UR: YELLOW
CREAT SERPL-MCNC: 1.4 MG/DL (ref 0.6–1.2)
D DIMER: 2.87 UG/ML FEU (ref 0–0.6)
DEPRECATED RDW RBC AUTO: 14.1 % (ref 12.4–15.4)
DO-HGB MFR BLDV: 25.3 %
EKG ATRIAL RATE: 125 BPM
EKG DIAGNOSIS: NORMAL
EKG P AXIS: 47 DEGREES
EKG P-R INTERVAL: 116 MS
EKG Q-T INTERVAL: 316 MS
EKG QRS DURATION: 70 MS
EKG QTC CALCULATION (BAZETT): 456 MS
EKG R AXIS: 9 DEGREES
EKG T AXIS: 46 DEGREES
EKG VENTRICULAR RATE: 125 BPM
EOSINOPHIL # BLD: 0 K/UL (ref 0–0.6)
EOSINOPHIL NFR BLD: 0 %
EPI CELLS #/AREA URNS HPF: ABNORMAL /HPF (ref 0–5)
FLUAV RNA RESP QL NAA+PROBE: NOT DETECTED
FLUBV RNA RESP QL NAA+PROBE: NOT DETECTED
GFR SERPLBLD CREATININE-BSD FMLA CKD-EPI: 42 ML/MIN/{1.73_M2}
GLUCOSE SERPL-MCNC: 107 MG/DL (ref 70–99)
GLUCOSE UR STRIP.AUTO-MCNC: NEGATIVE MG/DL
HCO3 BLDV-SCNC: 27.9 MMOL/L (ref 24–28)
HCT VFR BLD AUTO: 43.9 % (ref 36–48)
HGB BLD-MCNC: 14.8 G/DL (ref 12–16)
HGB UR QL STRIP.AUTO: NEGATIVE
KETONES UR STRIP.AUTO-MCNC: NEGATIVE MG/DL
LEUKOCYTE ESTERASE UR QL STRIP.AUTO: NEGATIVE
LYMPHOCYTES # BLD: 2 K/UL (ref 1–5.1)
LYMPHOCYTES NFR BLD: 11.8 %
MAGNESIUM SERPL-MCNC: 2 MG/DL (ref 1.8–2.4)
MCH RBC QN AUTO: 30.1 PG (ref 26–34)
MCHC RBC AUTO-ENTMCNC: 33.7 G/DL (ref 31–36)
MCV RBC AUTO: 89.3 FL (ref 80–100)
METHGB MFR BLDV: 0.2 % (ref 0–1.5)
MONOCYTES # BLD: 1.3 K/UL (ref 0–1.3)
MONOCYTES NFR BLD: 7.7 %
NEUTROPHILS # BLD: 13.2 K/UL (ref 1.7–7.7)
NEUTROPHILS NFR BLD: 80.2 %
NITRITE UR QL STRIP.AUTO: NEGATIVE
NT-PROBNP SERPL-MCNC: 467 PG/ML (ref 0–124)
PCO2 BLDV: 46.3 MMHG (ref 41–51)
PH BLDV: 7.39 [PH] (ref 7.35–7.45)
PH UR STRIP.AUTO: 6.5 [PH] (ref 5–8)
PLATELET # BLD AUTO: 245 K/UL (ref 135–450)
PMV BLD AUTO: 8.2 FL (ref 5–10.5)
PO2 BLDV: 38.7 MMHG (ref 25–40)
POTASSIUM SERPL-SCNC: 3.5 MMOL/L (ref 3.5–5.1)
PROCALCITONIN SERPL IA-MCNC: 0.07 NG/ML (ref 0–0.15)
PROT UR STRIP.AUTO-MCNC: ABNORMAL MG/DL
RBC # BLD AUTO: 4.92 M/UL (ref 4–5.2)
RBC #/AREA URNS HPF: ABNORMAL /HPF (ref 0–4)
SAO2 % BLDV: 74 %
SARS-COV-2 RNA RESP QL NAA+PROBE: NOT DETECTED
SODIUM SERPL-SCNC: 137 MMOL/L (ref 136–145)
SP GR UR STRIP.AUTO: 1.02 (ref 1–1.03)
TROPONIN, HIGH SENSITIVITY: 32 NG/L (ref 0–14)
TROPONIN, HIGH SENSITIVITY: 33 NG/L (ref 0–14)
TROPONIN, HIGH SENSITIVITY: 43 NG/L (ref 0–14)
TROPONIN, HIGH SENSITIVITY: 68 NG/L (ref 0–14)
UA COMPLETE W REFLEX CULTURE PNL UR: ABNORMAL
UA DIPSTICK W REFLEX MICRO PNL UR: YES
URN SPEC COLLECT METH UR: ABNORMAL
UROBILINOGEN UR STRIP-ACNC: 0.2 E.U./DL
WBC # BLD AUTO: 16.5 K/UL (ref 4–11)
WBC #/AREA URNS HPF: ABNORMAL /HPF (ref 0–5)

## 2023-12-06 PROCEDURE — 6370000000 HC RX 637 (ALT 250 FOR IP): Performed by: STUDENT IN AN ORGANIZED HEALTH CARE EDUCATION/TRAINING PROGRAM

## 2023-12-06 PROCEDURE — 85379 FIBRIN DEGRADATION QUANT: CPT

## 2023-12-06 PROCEDURE — 6360000002 HC RX W HCPCS: Performed by: PHYSICIAN ASSISTANT

## 2023-12-06 PROCEDURE — 84443 ASSAY THYROID STIM HORMONE: CPT

## 2023-12-06 PROCEDURE — 36415 COLL VENOUS BLD VENIPUNCTURE: CPT

## 2023-12-06 PROCEDURE — 85025 COMPLETE CBC W/AUTO DIFF WBC: CPT

## 2023-12-06 PROCEDURE — 71046 X-RAY EXAM CHEST 2 VIEWS: CPT

## 2023-12-06 PROCEDURE — 96374 THER/PROPH/DIAG INJ IV PUSH: CPT

## 2023-12-06 PROCEDURE — C9113 INJ PANTOPRAZOLE SODIUM, VIA: HCPCS | Performed by: PHYSICIAN ASSISTANT

## 2023-12-06 PROCEDURE — 83735 ASSAY OF MAGNESIUM: CPT

## 2023-12-06 PROCEDURE — 82803 BLOOD GASES ANY COMBINATION: CPT

## 2023-12-06 PROCEDURE — 96375 TX/PRO/DX INJ NEW DRUG ADDON: CPT

## 2023-12-06 PROCEDURE — 84484 ASSAY OF TROPONIN QUANT: CPT

## 2023-12-06 PROCEDURE — 1200000000 HC SEMI PRIVATE

## 2023-12-06 PROCEDURE — 6370000000 HC RX 637 (ALT 250 FOR IP): Performed by: PHYSICIAN ASSISTANT

## 2023-12-06 PROCEDURE — 99285 EMERGENCY DEPT VISIT HI MDM: CPT

## 2023-12-06 PROCEDURE — 2580000003 HC RX 258: Performed by: STUDENT IN AN ORGANIZED HEALTH CARE EDUCATION/TRAINING PROGRAM

## 2023-12-06 PROCEDURE — 87636 SARSCOV2 & INF A&B AMP PRB: CPT

## 2023-12-06 PROCEDURE — 6360000004 HC RX CONTRAST MEDICATION: Performed by: PHYSICIAN ASSISTANT

## 2023-12-06 PROCEDURE — 81001 URINALYSIS AUTO W/SCOPE: CPT

## 2023-12-06 PROCEDURE — 87040 BLOOD CULTURE FOR BACTERIA: CPT

## 2023-12-06 PROCEDURE — 83880 ASSAY OF NATRIURETIC PEPTIDE: CPT

## 2023-12-06 PROCEDURE — 71275 CT ANGIOGRAPHY CHEST: CPT

## 2023-12-06 PROCEDURE — 84145 PROCALCITONIN (PCT): CPT

## 2023-12-06 PROCEDURE — 6370000000 HC RX 637 (ALT 250 FOR IP): Performed by: NURSE PRACTITIONER

## 2023-12-06 PROCEDURE — 80048 BASIC METABOLIC PNL TOTAL CA: CPT

## 2023-12-06 PROCEDURE — 93005 ELECTROCARDIOGRAM TRACING: CPT | Performed by: STUDENT IN AN ORGANIZED HEALTH CARE EDUCATION/TRAINING PROGRAM

## 2023-12-06 RX ORDER — SODIUM CHLORIDE, SODIUM LACTATE, POTASSIUM CHLORIDE, CALCIUM CHLORIDE 600; 310; 30; 20 MG/100ML; MG/100ML; MG/100ML; MG/100ML
INJECTION, SOLUTION INTRAVENOUS CONTINUOUS
Status: DISCONTINUED | OUTPATIENT
Start: 2023-12-06 | End: 2023-12-07

## 2023-12-06 RX ORDER — MAGNESIUM HYDROXIDE/ALUMINUM HYDROXICE/SIMETHICONE 120; 1200; 1200 MG/30ML; MG/30ML; MG/30ML
30 SUSPENSION ORAL EVERY 6 HOURS PRN
Status: DISCONTINUED | OUTPATIENT
Start: 2023-12-06 | End: 2023-12-07 | Stop reason: SDUPTHER

## 2023-12-06 RX ORDER — POTASSIUM CHLORIDE 7.45 MG/ML
10 INJECTION INTRAVENOUS PRN
Status: DISCONTINUED | OUTPATIENT
Start: 2023-12-06 | End: 2023-12-09 | Stop reason: HOSPADM

## 2023-12-06 RX ORDER — HYDROXYZINE HYDROCHLORIDE 25 MG/1
25 TABLET, FILM COATED ORAL 3 TIMES DAILY PRN
Status: DISCONTINUED | OUTPATIENT
Start: 2023-12-06 | End: 2023-12-09 | Stop reason: HOSPADM

## 2023-12-06 RX ORDER — TIZANIDINE HYDROCHLORIDE 4 MG/1
12 CAPSULE, GELATIN COATED ORAL NIGHTLY
Status: DISCONTINUED | OUTPATIENT
Start: 2023-12-06 | End: 2023-12-06 | Stop reason: CLARIF

## 2023-12-06 RX ORDER — AMLODIPINE BESYLATE 5 MG/1
5 TABLET ORAL DAILY
Status: DISCONTINUED | OUTPATIENT
Start: 2023-12-07 | End: 2023-12-09 | Stop reason: HOSPADM

## 2023-12-06 RX ORDER — ONDANSETRON 4 MG/1
4 TABLET, ORALLY DISINTEGRATING ORAL EVERY 8 HOURS PRN
Status: DISCONTINUED | OUTPATIENT
Start: 2023-12-06 | End: 2023-12-09 | Stop reason: HOSPADM

## 2023-12-06 RX ORDER — SODIUM CHLORIDE 0.9 % (FLUSH) 0.9 %
5-40 SYRINGE (ML) INJECTION EVERY 12 HOURS SCHEDULED
Status: DISCONTINUED | OUTPATIENT
Start: 2023-12-06 | End: 2023-12-09 | Stop reason: HOSPADM

## 2023-12-06 RX ORDER — TRAZODONE HYDROCHLORIDE 100 MG/1
200 TABLET ORAL NIGHTLY
Status: DISCONTINUED | OUTPATIENT
Start: 2023-12-06 | End: 2023-12-09 | Stop reason: HOSPADM

## 2023-12-06 RX ORDER — ATOMOXETINE 60 MG/1
60 CAPSULE ORAL DAILY
Status: DISCONTINUED | OUTPATIENT
Start: 2023-12-07 | End: 2023-12-09 | Stop reason: HOSPADM

## 2023-12-06 RX ORDER — PANTOPRAZOLE SODIUM 40 MG/1
40 TABLET, DELAYED RELEASE ORAL 2 TIMES DAILY
Status: DISCONTINUED | OUTPATIENT
Start: 2023-12-06 | End: 2023-12-07

## 2023-12-06 RX ORDER — TIZANIDINE 4 MG/1
4 TABLET ORAL EVERY 8 HOURS PRN
Status: DISCONTINUED | OUTPATIENT
Start: 2023-12-06 | End: 2023-12-09 | Stop reason: HOSPADM

## 2023-12-06 RX ORDER — MAGNESIUM SULFATE IN WATER 40 MG/ML
2000 INJECTION, SOLUTION INTRAVENOUS PRN
Status: DISCONTINUED | OUTPATIENT
Start: 2023-12-06 | End: 2023-12-09 | Stop reason: HOSPADM

## 2023-12-06 RX ORDER — ASPIRIN 81 MG/1
81 TABLET, CHEWABLE ORAL DAILY
Status: DISCONTINUED | OUTPATIENT
Start: 2023-12-07 | End: 2023-12-09 | Stop reason: HOSPADM

## 2023-12-06 RX ORDER — NICOTINE 21 MG/24HR
1 PATCH, TRANSDERMAL 24 HOURS TRANSDERMAL DAILY
Status: DISCONTINUED | OUTPATIENT
Start: 2023-12-06 | End: 2023-12-09 | Stop reason: HOSPADM

## 2023-12-06 RX ORDER — POLYETHYLENE GLYCOL 3350 17 G/17G
17 POWDER, FOR SOLUTION ORAL DAILY PRN
Status: DISCONTINUED | OUTPATIENT
Start: 2023-12-06 | End: 2023-12-09 | Stop reason: HOSPADM

## 2023-12-06 RX ORDER — TRAZODONE HYDROCHLORIDE 100 MG/1
200 TABLET ORAL NIGHTLY
Status: DISCONTINUED | OUTPATIENT
Start: 2023-12-07 | End: 2023-12-06

## 2023-12-06 RX ORDER — ATOMOXETINE 60 MG/1
60 CAPSULE ORAL DAILY
COMMUNITY
Start: 2023-11-14

## 2023-12-06 RX ORDER — NITROGLYCERIN 0.4 MG/1
0.4 TABLET SUBLINGUAL EVERY 5 MIN PRN
Status: DISCONTINUED | OUTPATIENT
Start: 2023-12-06 | End: 2023-12-09 | Stop reason: HOSPADM

## 2023-12-06 RX ORDER — ONDANSETRON 2 MG/ML
4 INJECTION INTRAMUSCULAR; INTRAVENOUS EVERY 6 HOURS PRN
Status: DISCONTINUED | OUTPATIENT
Start: 2023-12-06 | End: 2023-12-09 | Stop reason: HOSPADM

## 2023-12-06 RX ORDER — PANTOPRAZOLE SODIUM 40 MG/10ML
40 INJECTION, POWDER, LYOPHILIZED, FOR SOLUTION INTRAVENOUS ONCE
Status: COMPLETED | OUTPATIENT
Start: 2023-12-06 | End: 2023-12-06

## 2023-12-06 RX ORDER — ATORVASTATIN CALCIUM 40 MG/1
40 TABLET, FILM COATED ORAL NIGHTLY
Status: DISCONTINUED | OUTPATIENT
Start: 2023-12-06 | End: 2023-12-09 | Stop reason: HOSPADM

## 2023-12-06 RX ORDER — ACETAMINOPHEN 650 MG/1
650 SUPPOSITORY RECTAL EVERY 6 HOURS PRN
Status: DISCONTINUED | OUTPATIENT
Start: 2023-12-06 | End: 2023-12-09 | Stop reason: HOSPADM

## 2023-12-06 RX ORDER — ACETAMINOPHEN 325 MG/1
650 TABLET ORAL EVERY 6 HOURS PRN
Status: DISCONTINUED | OUTPATIENT
Start: 2023-12-06 | End: 2023-12-09 | Stop reason: HOSPADM

## 2023-12-06 RX ORDER — MONTELUKAST SODIUM 4 MG/1
3 TABLET, CHEWABLE ORAL 2 TIMES DAILY
COMMUNITY
Start: 2023-10-31

## 2023-12-06 RX ORDER — SODIUM CHLORIDE 0.9 % (FLUSH) 0.9 %
5-40 SYRINGE (ML) INJECTION PRN
Status: DISCONTINUED | OUTPATIENT
Start: 2023-12-06 | End: 2023-12-09 | Stop reason: HOSPADM

## 2023-12-06 RX ORDER — ENOXAPARIN SODIUM 100 MG/ML
40 INJECTION SUBCUTANEOUS DAILY
Status: DISCONTINUED | OUTPATIENT
Start: 2023-12-07 | End: 2023-12-09 | Stop reason: HOSPADM

## 2023-12-06 RX ORDER — LORAZEPAM 1 MG/1
1 TABLET ORAL ONCE
Status: COMPLETED | OUTPATIENT
Start: 2023-12-06 | End: 2023-12-06

## 2023-12-06 RX ORDER — POTASSIUM CHLORIDE 20 MEQ/1
40 TABLET, EXTENDED RELEASE ORAL PRN
Status: DISCONTINUED | OUTPATIENT
Start: 2023-12-06 | End: 2023-12-09 | Stop reason: HOSPADM

## 2023-12-06 RX ORDER — KETOROLAC TROMETHAMINE 30 MG/ML
15 INJECTION, SOLUTION INTRAMUSCULAR; INTRAVENOUS ONCE
Status: COMPLETED | OUTPATIENT
Start: 2023-12-06 | End: 2023-12-06

## 2023-12-06 RX ORDER — SODIUM CHLORIDE 9 MG/ML
INJECTION, SOLUTION INTRAVENOUS PRN
Status: DISCONTINUED | OUTPATIENT
Start: 2023-12-06 | End: 2023-12-09 | Stop reason: HOSPADM

## 2023-12-06 RX ADMIN — TRAZODONE HYDROCHLORIDE 200 MG: 100 TABLET ORAL at 22:50

## 2023-12-06 RX ADMIN — LORAZEPAM 1 MG: 1 TABLET ORAL at 11:48

## 2023-12-06 RX ADMIN — METOPROLOL TARTRATE 25 MG: 25 TABLET, FILM COATED ORAL at 20:01

## 2023-12-06 RX ADMIN — SODIUM CHLORIDE, POTASSIUM CHLORIDE, SODIUM LACTATE AND CALCIUM CHLORIDE: 600; 310; 30; 20 INJECTION, SOLUTION INTRAVENOUS at 17:57

## 2023-12-06 RX ADMIN — ALUMINUM HYDROXIDE, MAGNESIUM HYDROXIDE, AND SIMETHICONE: 1200; 120; 1200 SUSPENSION ORAL at 14:16

## 2023-12-06 RX ADMIN — TIZANIDINE 4 MG: 4 TABLET ORAL at 20:06

## 2023-12-06 RX ADMIN — PANTOPRAZOLE SODIUM 40 MG: 40 INJECTION, POWDER, FOR SOLUTION INTRAVENOUS at 14:17

## 2023-12-06 RX ADMIN — ATORVASTATIN CALCIUM 40 MG: 40 TABLET, FILM COATED ORAL at 20:01

## 2023-12-06 RX ADMIN — PANTOPRAZOLE SODIUM 40 MG: 40 TABLET, DELAYED RELEASE ORAL at 20:01

## 2023-12-06 RX ADMIN — IOPAMIDOL 75 ML: 755 INJECTION, SOLUTION INTRAVENOUS at 12:07

## 2023-12-06 RX ADMIN — KETOROLAC TROMETHAMINE 15 MG: 30 INJECTION, SOLUTION INTRAMUSCULAR at 11:48

## 2023-12-06 ASSESSMENT — ENCOUNTER SYMPTOMS
CHEST TIGHTNESS: 1
NAUSEA: 0
SHORTNESS OF BREATH: 1
ABDOMINAL PAIN: 0
DIARRHEA: 0
WHEEZING: 0
COUGH: 1
CONSTIPATION: 0
VOMITING: 1
BACK PAIN: 0

## 2023-12-06 ASSESSMENT — PAIN DESCRIPTION - LOCATION: LOCATION: CHEST

## 2023-12-06 ASSESSMENT — PAIN SCALES - GENERAL: PAINLEVEL_OUTOF10: 8

## 2023-12-06 ASSESSMENT — PAIN - FUNCTIONAL ASSESSMENT: PAIN_FUNCTIONAL_ASSESSMENT: 0-10

## 2023-12-06 ASSESSMENT — HEART SCORE: ECG: 1

## 2023-12-06 NOTE — ED TRIAGE NOTES
Pt presents with mid chest pain rates pain 8/10 for the past week. Pt states it may just be her anxiety. Pt unable to give her medical history, states she takes meds but they are for her htn and anxiety. Per family at bedside patient is very anxious from the time she wakes up.  Pt at baseline per family, pt keeps asking, \"do I have to stay, I dont want to stay\"

## 2023-12-06 NOTE — H&P
V2.0  History and Physical      Name:  Ike Cornejo /Age/Sex: 1960  (70 y.o. female)   MRN & CSN:  [id number] & [id number] Encounter Date/Time: 2023 4:30 PM EST   [facility]  [id number] PCP: Hadley Oliveira Day: 1    Assessment and Plan:   Patient is a 78-year-old female with past medical history of anxiety, GERD, ADHD, CKD who presents to the ED with complaints of chest pain    Hospital Problems             Last Modified POA    * (Principal) Chest pain 2023 Yes   Chest pain r/o NSTEMI  Esophageal wall thickening  Anxiety  CALI on CKD  SIRS  ADHD    - Patient presents with complaints of chest pain, palpitations, lightheadedness, with 1 associated episode of emesis. Troponins elevated but downtrending, EKG with no ischemic changes. CTA chest concerning for esophagitis. - will get cardiac workup given chest pain in setting of elevated troponins. Lipid panel, A1c, TSH with reflex T4 ordered. Will consult cardiology, obtain exercise stress test with nuclear study, echocardiogram. Start on aspirin 81 mg daily, atorvastatin 40 mg daily, as needed sublingual nitroglycerin. Monitor on telemetry, n.p.o. after midnight  - Placed on as needed Maalox, Protonix, Zofran for esophagitis noted on CTA chest, consider GI consult if emesis persists, otherwise follow-up as outpatient.   - Patient declines BuSpar, Lexapro, will place on as needed hydroxyzine, nightly trazodone, follow-up with outpatient provider for further anxiety management  - Placed on IV LR, trend BMPs  - Patient with tachycardia, tachypnea, leukocytosis on presentation, denies fever, will check blood cultures, procalcitonin, trend WBC/fever curve, hold off on antibiotics for now  - continue rest of chronic home meds        Disposition:   Current Living situation: Home  Expected Disposition: Home  Estimated D/C: 3 days    Diet Regular, no caffeine, n.p.o. after midnight   DVT Prophylaxis [x] Lovenox, [] rhythm, no murmurs, rubs, gallops  Respiratory: no respiratory distress, Clear to auscultation bilaterally, no wheezing, rhonchi, crackles  Gastrointestinal: Soft, non tender, nondistended, normal bowel sounds  Genitourinary: no suprapubic tenderness  Musculoskeletal: Normal tone, ROM, strength in all extremities  Ext: No edema  Skin: warm, dry, no rash  Neuro: Alert, oriented x3, no gross focal neurological deficits  Psych: Mood appropriate. Past Medical History:   PMHx   Past Medical History:   Diagnosis Date    Anxiety     Arthritis     Hepatitis C     Hyperlipidemia     Hypertension     Reflux esophagitis      PSHX:  has a past surgical history that includes joint replacement; fracture surgery; Carotid endarterectomy (Left, 7/20/2020); and Bladder surgery (Bilateral, 9/25/2022). Allergies: Allergies   Allergen Reactions    Nut  [Macadamia Nut Oil] Hives    Peanut (Diagnostic) Hives and Swelling    Penicillins Hives and Nausea And Vomiting     Patient has tolerated cefepime as an inpatient (2022) per chart review on 1/20/23. Buspirone Hcl Hallucinations     Patient reports visual hallucinations of \"man on a horse\" and \"man on a bike\"    Hydrochlorothiazide Other (See Comments)     Hyponatremia     Fam HX: family history is not on file. Soc HX:   Social History     Socioeconomic History    Marital status:     Tobacco Use    Smoking status: Every Day     Packs/day: 1.00     Years: 23.00     Additional pack years: 0.00     Total pack years: 23.00     Types: Cigarettes    Smokeless tobacco: Never   Vaping Use    Vaping Use: Never used   Substance and Sexual Activity    Alcohol use: Yes     Comment: soc    Drug use: Never       Medications:   Medications:    Infusions:   PRN Meds:     Labs      CBC:   Recent Labs     12/06/23  1108   WBC 16.5*   HGB 14.8        BMP:    Recent Labs     12/06/23  1108      K 3.5   CL 97*   CO2 26   BUN 21*   CREATININE 1.4*   GLUCOSE 107*     Hepatic: No

## 2023-12-07 LAB
ALBUMIN SERPL-MCNC: 3.7 G/DL (ref 3.4–5)
ALBUMIN/GLOB SERPL: 1.4 {RATIO} (ref 1.1–2.2)
ALP SERPL-CCNC: 62 U/L (ref 40–129)
ALT SERPL-CCNC: 12 U/L (ref 10–40)
AMPHETAMINES UR QL SCN>1000 NG/ML: NORMAL
ANION GAP SERPL CALCULATED.3IONS-SCNC: 10 MMOL/L (ref 3–16)
AST SERPL-CCNC: 19 U/L (ref 15–37)
BARBITURATES UR QL SCN>200 NG/ML: NORMAL
BENZODIAZ UR QL SCN>200 NG/ML: NORMAL
BILIRUB SERPL-MCNC: 0.3 MG/DL (ref 0–1)
BUN SERPL-MCNC: 15 MG/DL (ref 7–20)
CALCIUM SERPL-MCNC: 8.8 MG/DL (ref 8.3–10.6)
CANNABINOIDS UR QL SCN>50 NG/ML: NORMAL
CHLORIDE SERPL-SCNC: 102 MMOL/L (ref 99–110)
CHOLEST SERPL-MCNC: 111 MG/DL (ref 0–199)
CO2 SERPL-SCNC: 24 MMOL/L (ref 21–32)
COCAINE UR QL SCN: NORMAL
CREAT SERPL-MCNC: 0.9 MG/DL (ref 0.6–1.2)
DEPRECATED RDW RBC AUTO: 13.8 % (ref 12.4–15.4)
DRUG SCREEN COMMENT UR-IMP: NORMAL
FENTANYL SCREEN, URINE: NORMAL
GFR SERPLBLD CREATININE-BSD FMLA CKD-EPI: >60 ML/MIN/{1.73_M2}
GLUCOSE SERPL-MCNC: 92 MG/DL (ref 70–99)
HCT VFR BLD AUTO: 42.1 % (ref 36–48)
HDLC SERPL-MCNC: 51 MG/DL (ref 40–60)
HGB BLD-MCNC: 14 G/DL (ref 12–16)
LDLC SERPL CALC-MCNC: 39 MG/DL
MCH RBC QN AUTO: 30 PG (ref 26–34)
MCHC RBC AUTO-ENTMCNC: 33.2 G/DL (ref 31–36)
MCV RBC AUTO: 90.2 FL (ref 80–100)
METHADONE UR QL SCN>300 NG/ML: NORMAL
OPIATES UR QL SCN>300 NG/ML: NORMAL
OXYCODONE UR QL SCN: NORMAL
PCP UR QL SCN>25 NG/ML: NORMAL
PH UR STRIP: 6 [PH]
PLATELET # BLD AUTO: 173 K/UL (ref 135–450)
PMV BLD AUTO: 7.8 FL (ref 5–10.5)
POTASSIUM SERPL-SCNC: 4 MMOL/L (ref 3.5–5.1)
PROT SERPL-MCNC: 6.3 G/DL (ref 6.4–8.2)
RBC # BLD AUTO: 4.67 M/UL (ref 4–5.2)
SODIUM SERPL-SCNC: 136 MMOL/L (ref 136–145)
TRIGL SERPL-MCNC: 104 MG/DL (ref 0–150)
TSH SERPL DL<=0.005 MIU/L-ACNC: 1.8 UIU/ML (ref 0.27–4.2)
VLDLC SERPL CALC-MCNC: 21 MG/DL
WBC # BLD AUTO: 8.5 K/UL (ref 4–11)

## 2023-12-07 PROCEDURE — 85027 COMPLETE CBC AUTOMATED: CPT

## 2023-12-07 PROCEDURE — 6360000002 HC RX W HCPCS: Performed by: STUDENT IN AN ORGANIZED HEALTH CARE EDUCATION/TRAINING PROGRAM

## 2023-12-07 PROCEDURE — 2580000003 HC RX 258: Performed by: STUDENT IN AN ORGANIZED HEALTH CARE EDUCATION/TRAINING PROGRAM

## 2023-12-07 PROCEDURE — 1200000000 HC SEMI PRIVATE

## 2023-12-07 PROCEDURE — 6370000000 HC RX 637 (ALT 250 FOR IP): Performed by: NURSE PRACTITIONER

## 2023-12-07 PROCEDURE — C9113 INJ PANTOPRAZOLE SODIUM, VIA: HCPCS | Performed by: STUDENT IN AN ORGANIZED HEALTH CARE EDUCATION/TRAINING PROGRAM

## 2023-12-07 PROCEDURE — 80307 DRUG TEST PRSMV CHEM ANLYZR: CPT

## 2023-12-07 PROCEDURE — 6370000000 HC RX 637 (ALT 250 FOR IP): Performed by: STUDENT IN AN ORGANIZED HEALTH CARE EDUCATION/TRAINING PROGRAM

## 2023-12-07 PROCEDURE — 6370000000 HC RX 637 (ALT 250 FOR IP): Performed by: PHYSICIAN ASSISTANT

## 2023-12-07 PROCEDURE — 80053 COMPREHEN METABOLIC PANEL: CPT

## 2023-12-07 PROCEDURE — 80061 LIPID PANEL: CPT

## 2023-12-07 PROCEDURE — 93306 TTE W/DOPPLER COMPLETE: CPT

## 2023-12-07 PROCEDURE — 99223 1ST HOSP IP/OBS HIGH 75: CPT | Performed by: INTERNAL MEDICINE

## 2023-12-07 RX ORDER — PANTOPRAZOLE SODIUM 40 MG/10ML
40 INJECTION, POWDER, LYOPHILIZED, FOR SOLUTION INTRAVENOUS DAILY
Status: DISCONTINUED | OUTPATIENT
Start: 2023-12-07 | End: 2023-12-08

## 2023-12-07 RX ADMIN — METOPROLOL TARTRATE 25 MG: 25 TABLET, FILM COATED ORAL at 20:05

## 2023-12-07 RX ADMIN — ALUMINUM HYDROXIDE, MAGNESIUM HYDROXIDE, AND SIMETHICONE: 1200; 120; 1200 SUSPENSION ORAL at 15:02

## 2023-12-07 RX ADMIN — TRAZODONE HYDROCHLORIDE 200 MG: 100 TABLET ORAL at 19:42

## 2023-12-07 RX ADMIN — PANTOPRAZOLE SODIUM 40 MG: 40 TABLET, DELAYED RELEASE ORAL at 09:27

## 2023-12-07 RX ADMIN — HYDROXYZINE HYDROCHLORIDE 25 MG: 25 TABLET ORAL at 02:36

## 2023-12-07 RX ADMIN — METOPROLOL TARTRATE 25 MG: 25 TABLET, FILM COATED ORAL at 09:27

## 2023-12-07 RX ADMIN — HYDROXYZINE HYDROCHLORIDE 25 MG: 25 TABLET ORAL at 18:34

## 2023-12-07 RX ADMIN — ATOMOXETINE 60 MG: 60 CAPSULE ORAL at 10:34

## 2023-12-07 RX ADMIN — AMLODIPINE BESYLATE 5 MG: 5 TABLET ORAL at 09:27

## 2023-12-07 RX ADMIN — ALUMINUM HYDROXIDE, MAGNESIUM HYDROXIDE, AND SIMETHICONE: 1200; 120; 1200 SUSPENSION ORAL at 18:33

## 2023-12-07 RX ADMIN — SODIUM CHLORIDE, POTASSIUM CHLORIDE, SODIUM LACTATE AND CALCIUM CHLORIDE: 600; 310; 30; 20 INJECTION, SOLUTION INTRAVENOUS at 07:22

## 2023-12-07 RX ADMIN — PANTOPRAZOLE SODIUM 40 MG: 40 INJECTION, POWDER, FOR SOLUTION INTRAVENOUS at 15:03

## 2023-12-07 RX ADMIN — ATORVASTATIN CALCIUM 40 MG: 40 TABLET, FILM COATED ORAL at 19:42

## 2023-12-07 RX ADMIN — TIZANIDINE 4 MG: 4 TABLET ORAL at 19:42

## 2023-12-07 RX ADMIN — HYDROXYZINE HYDROCHLORIDE 25 MG: 25 TABLET ORAL at 11:57

## 2023-12-07 RX ADMIN — ASPIRIN 81 MG: 81 TABLET, CHEWABLE ORAL at 09:27

## 2023-12-07 RX ADMIN — ALUMINUM HYDROXIDE, MAGNESIUM HYDROXIDE, AND SIMETHICONE: 1200; 120; 1200 SUSPENSION ORAL at 19:30

## 2023-12-07 RX ADMIN — ENOXAPARIN SODIUM 40 MG: 100 INJECTION SUBCUTANEOUS at 09:27

## 2023-12-07 RX ADMIN — ACETAMINOPHEN 650 MG: 325 TABLET ORAL at 02:36

## 2023-12-07 RX ADMIN — ACETAMINOPHEN 650 MG: 325 TABLET ORAL at 09:27

## 2023-12-07 ASSESSMENT — PAIN SCALES - WONG BAKER
WONGBAKER_NUMERICALRESPONSE: 2
WONGBAKER_NUMERICALRESPONSE: 4
WONGBAKER_NUMERICALRESPONSE: 4

## 2023-12-07 ASSESSMENT — PAIN - FUNCTIONAL ASSESSMENT
PAIN_FUNCTIONAL_ASSESSMENT: ACTIVITIES ARE NOT PREVENTED

## 2023-12-07 ASSESSMENT — PAIN DESCRIPTION - PAIN TYPE
TYPE: ACUTE PAIN

## 2023-12-07 ASSESSMENT — ENCOUNTER SYMPTOMS
CHEST TIGHTNESS: 1
BACK PAIN: 0
VOMITING: 0
NAUSEA: 0
SHORTNESS OF BREATH: 0
COUGH: 0

## 2023-12-07 ASSESSMENT — PAIN DESCRIPTION - ORIENTATION
ORIENTATION: MID

## 2023-12-07 ASSESSMENT — PAIN DESCRIPTION - FREQUENCY
FREQUENCY: INTERMITTENT

## 2023-12-07 ASSESSMENT — PAIN DESCRIPTION - ONSET
ONSET: ON-GOING

## 2023-12-07 ASSESSMENT — PAIN DESCRIPTION - DESCRIPTORS
DESCRIPTORS: ACHING;TIGHTNESS
DESCRIPTORS: ACHING

## 2023-12-07 ASSESSMENT — PAIN SCALES - GENERAL
PAINLEVEL_OUTOF10: 4
PAINLEVEL_OUTOF10: 2
PAINLEVEL_OUTOF10: 8
PAINLEVEL_OUTOF10: 4
PAINLEVEL_OUTOF10: 8
PAINLEVEL_OUTOF10: 8
PAINLEVEL_OUTOF10: 6

## 2023-12-07 ASSESSMENT — PAIN DESCRIPTION - LOCATION
LOCATION: CHEST

## 2023-12-07 NOTE — PLAN OF CARE
Problem: Chronic Conditions and Co-morbidities  Goal: Patient's chronic conditions and co-morbidity symptoms are monitored and maintained or improved  Outcome: Progressing  Flowsheets (Taken 12/6/2023 2029)  Care Plan - Patient's Chronic Conditions and Co-Morbidity Symptoms are Monitored and Maintained or Improved: Monitor and assess patient's chronic conditions and comorbid symptoms for stability, deterioration, or improvement     Problem: Discharge Planning  Goal: Discharge to home or other facility with appropriate resources  Outcome: Progressing  Flowsheets (Taken 12/6/2023 2029)  Discharge to home or other facility with appropriate resources: Identify barriers to discharge with patient and caregiver

## 2023-12-07 NOTE — CARE COORDINATION
BARRY Notes: CM met with pt and her boyfriend at bedside. Pt reports she lives in a two story home with her boyfriend - pt does not go to the second floor but does do laundry in the basement. Pt is independent pta, no use of DME and self-. Pt has a PCP who she sees every 3 months. Pt reports she'll have no HHC, DME or CM needs at discharge.   Electronically signed by Bettina Simmonds, MSW on 12/7/2023 at 2:47 PM  [phone]

## 2023-12-07 NOTE — CONSULTS
[address]  Cardiology Inpatient Consult Service                                                                                          Pt Name: Ike Cornejo  Age: 70 y.o. Sex: female  : 1960  Location: [facility]/[address] [place] [address]    Referring Physician: Skylar Burroughs MD      Reason for Consult:       Reason for Consultation/Chief Complaint: Chest pain      HPI:      Ike Cornejo is a 70 y.o. female with a past medical history of anxiety, GERD, ADHD, CKD who presented to the hospital with cc of chest pain. Patient initially presented complaints of acute onset midsternal, moderate to severe, sharp chest pain accompanied by palpitations, lightheadedness, and vomiting x 1. Initial lab work concerning for troponin 68 downtrending to [phone] 42. EKG showing tachycardia without ischemic changes. Otherwise, lab work notable for creatinine 1.4 (baseline 1.0 and D-dimer elevated at 2.87 subsequent CTA showed signs of esophagitis, but no pulmonary embolism or other acute abnormality. Patient was admitted for concerns for ACS. On assessment, patient resting comfortably in bed and satting well on room air. She complains of mild midsternal chest pain exacerbated with deep breaths or palpation. Denies history of similar symptoms and does express concern symptoms are secondary to her anxiety. Discussed findings available so far and low suspicion for active ACS. Patient denies any headache, lightheadedness, dizziness, arm pain, neck pain, diaphoresis, dyspnea at this time. Denies personal history of similar symptoms or family history of CAD. Histories     Past Medical History:   has a past medical history of Anxiety, Arthritis, Hepatitis C, Hyperlipidemia, Hypertension, and Reflux esophagitis. Surgical History:   has a past surgical history that includes joint replacement; fracture surgery;  Carotid endarterectomy (Left, 2020); and Bladder surgery (Bilateral, results  - No stress test at this time    Tobacco use was discussed with the patient and educated on the negative effects. I have asked the patient to not utilize these agents.    Thank you for allowing to us to participate in the care or Cait Mendoza. Further evaluation will be based upon the patient's clinical course and testing results.    Serg Anderson MD  Internal Medicine Resident  PGY-2

## 2023-12-07 NOTE — PLAN OF CARE
Problem: Discharge Planning  Goal: Discharge to home or other facility with appropriate resources  12/7/2023 1529 by Robin Rosas RN  Outcome: Progressing     Problem: Pain  Goal: Verbalizes/displays adequate comfort level or baseline comfort level  12/7/2023 1529 by Robin Rosas RN  Outcome: Progressing   Patient given prn tylenol for pain-effective  Problem: Safety - Adult  Goal: Free from fall injury  Outcome: Progressing  Patient free from falls this shift

## 2023-12-07 NOTE — PROGRESS NOTES
Patient arrived to floor on stretcher and able to ambulate self to bed. Patient continues on IV fluids with no complications. Patient alert and oriented x4. No complaints of chest pain unless coughing. When she coughs she gets mid chest pain that resolves when she is done coughing. Vitals documented and WNL. Patient oriented to room, bathroom, bed, tv and call light. Patient able to ambulate to bathroom safely. Patient in bed with call light in reach.

## 2023-12-07 NOTE — PROGRESS NOTES
Hospital Medicine Progress Note      Date of Admission: 12/6/2023  Hospital Day: 2    Chief Admission Complaint: Chest pain     Subjective: Patient was seen and evaluated at bedside. Reported mild ongoing central chest discomfort. Did not have any other complaints. Did not have any events overnight. Presenting Admission History:        78-year-old female with past medical history of anxiety, GERD, ADHD who presents to the ED with complaints of chest pain. Patient was in her usual state of health until about a week ago when she began to experience chest pain which was retrosternal, pressure-like, severe [8/10], nonradiating, worsened with exertion, relieved by rest, associated with palpitations and lightheadedness. Patient also had 1 episode of nausea with vomiting PERRL, conjunctiva normal. Of note, patient states she recently stopped taking her BuSpar due to concerns it was \"making her wild\". Due to persistence of symptoms patient presented to ED. Evaluation in the ED was remarkable for tachycardia, elevated creatinine 1.4 from baseline around 1, elevated troponins 68, downtrending to 43 on repeat, leukocytosis 16.5, EKG showed sinus tachycardia with no ST elevations or depressions, CTA chest showed no pulmonary embolus, did reveal diffuse esophageal wall thickening, concerning for esophagitis.     Assessment/Plan:      Chest pain  Elevated troponin  Esophageal wall thickening  CALI, resolved  ADD  Hypertension  Nicotine dependence    Plan:  *Continues to complain of retrosternal chest discomfort  Troponins trended, 43, 32  No ST-T wave changes on EKG  Transthoracic echo shows an EF of 60% without wall motion abnormality  Cardiology on consult, plans for stress test?  Gentle IV fluids since patient is n.p.o.    *Incidental finding of esophageal wall thickening  Start IV PPI therapy  Maalox 4 times daily  GI consult     *Presented with CALI, resolved    *Recently discontinued taking BuSpar  Continue trazodone, atomoxetine as at home    *Monitor blood pressure  Continue home amlodipine and metoprolol    *Advised smoking cessation  Offered nicotine patch    Physical Exam Performed:    General: No distress, alert and oriented x3,  Cardiac: S1 plus S2 regular rate and rhythm, no murmurs rubs or gallops  Respiratory: No wheeze or crackles appreciated, equal air entry bilaterally  GI/: Abdomen soft, nontender, bowel sounds audible  Skin: No rashes or ulcers present  MSK: Peripheral pulses intact      BP (!) 155/94   Pulse (!) 108   Temp 98.9 °F (37.2 °C) (Oral)   Resp 17   Ht 1.626 m (5' 4\")   Wt 64.3 kg (141 lb 12.8 oz)   SpO2 96%   BMI 24.34 kg/m²     Diet: Diet NPO  DVT Prophylaxis: [x]PPx LMWH  []SQ Heparin  []IPC/SCDs  []Eliquis  []Xarelto  []Coumadin  []Other -      Code status: Full Code  PT/OT Eval Status:   [x]NOT yet ordered  []Ordered and Pending   []Seen with Recommendations for:  []Home independently  []Home w/ assist  []HHC  []SNF  []Acute Rehab      Anticipated Discharge Location: [x]Home  []HHC  []SNF  []Acute Rehab  []ECF  []LTAC  []Hospice  []Other -      Consults:      IP CONSULT TO CARDIOLOGY        Medications:  Personally reviewed in detail in conjunction w/ labs as documented for evidence of drug toxicity.     Infusion Medications    sodium chloride      lactated ringers IV soln 75 mL/hr at 12/07/23 0722     Scheduled Medications    amLODIPine  5 mg Oral Daily    aspirin  81 mg Oral Daily    pantoprazole  40 mg Oral BID    sodium chloride flush  5-40 mL IntraVENous 2 times per day    atorvastatin  40 mg Oral Nightly    enoxaparin  40 mg SubCUTAneous Daily    metoprolol tartrate  25 mg Oral BID    atomoxetine  60 mg Oral Daily    nicotine  1 patch TransDERmal Daily    traZODone  200 mg Oral Nightly     PRN Meds: sodium chloride flush, sodium chloride, potassium chloride **OR** potassium alternative oral replacement **OR** potassium chloride, magnesium sulfate, ondansetron **OR**

## 2023-12-07 NOTE — PROGRESS NOTES
4 Eyes Skin Assessment     NAME:  Porsha Bean OF BIRTH:  1960  MEDICAL RECORD NUMBER:  [id number]    The patient is being assessed for  Admission    I agree that at least one RN has performed a thorough Head to Toe Skin Assessment on the patient. ALL assessment sites listed below have been assessed. Areas assessed by both nurses:    Head, Face, Ears, Shoulders, Back, Chest, Arms, Elbows, Hands, Sacrum. Buttock, Coccyx, Ischium, Legs. Feet and Heels, and Under Medical Devices         Does the Patient have a Wound?  No noted wound(s)       Faustino Prevention initiated by RN: No  Wound Care Orders initiated by RN: No    Pressure Injury (Stage 3,4, Unstageable, DTI, NWPT, and Complex wounds) if present, place Wound referral order by RN under : No    New Ostomies, if present place, Ostomy referral order under : No     Nurse 1 eSignature: Electronically signed by Sharee Covarrubias RN on 12/6/23 at 9:35 PM EST    **SHARE this note so that the co-signing nurse can place an eSignature**    Nurse 2 eSignature: Electronically signed by Marva Elliott RN on 12/7/23 at 2:03 AM EST

## 2023-12-08 ENCOUNTER — ANESTHESIA (OUTPATIENT)
Dept: ENDOSCOPY | Age: 63
End: 2023-12-08
Payer: MEDICARE

## 2023-12-08 ENCOUNTER — ANESTHESIA EVENT (OUTPATIENT)
Dept: ENDOSCOPY | Age: 63
End: 2023-12-08
Payer: MEDICARE

## 2023-12-08 PROCEDURE — 3700000000 HC ANESTHESIA ATTENDED CARE: Performed by: INTERNAL MEDICINE

## 2023-12-08 PROCEDURE — 88305 TISSUE EXAM BY PATHOLOGIST: CPT

## 2023-12-08 PROCEDURE — C9113 INJ PANTOPRAZOLE SODIUM, VIA: HCPCS | Performed by: STUDENT IN AN ORGANIZED HEALTH CARE EDUCATION/TRAINING PROGRAM

## 2023-12-08 PROCEDURE — 2580000003 HC RX 258: Performed by: INTERNAL MEDICINE

## 2023-12-08 PROCEDURE — 6370000000 HC RX 637 (ALT 250 FOR IP): Performed by: INTERNAL MEDICINE

## 2023-12-08 PROCEDURE — 7100000011 HC PHASE II RECOVERY - ADDTL 15 MIN: Performed by: INTERNAL MEDICINE

## 2023-12-08 PROCEDURE — 7100000010 HC PHASE II RECOVERY - FIRST 15 MIN: Performed by: INTERNAL MEDICINE

## 2023-12-08 PROCEDURE — 0DB58ZX EXCISION OF ESOPHAGUS, VIA NATURAL OR ARTIFICIAL OPENING ENDOSCOPIC, DIAGNOSTIC: ICD-10-PCS | Performed by: INTERNAL MEDICINE

## 2023-12-08 PROCEDURE — 1200000000 HC SEMI PRIVATE

## 2023-12-08 PROCEDURE — 3609017100 HC EGD: Performed by: INTERNAL MEDICINE

## 2023-12-08 PROCEDURE — 6360000002 HC RX W HCPCS: Performed by: STUDENT IN AN ORGANIZED HEALTH CARE EDUCATION/TRAINING PROGRAM

## 2023-12-08 PROCEDURE — 2709999900 HC NON-CHARGEABLE SUPPLY: Performed by: INTERNAL MEDICINE

## 2023-12-08 PROCEDURE — 88342 IMHCHEM/IMCYTCHM 1ST ANTB: CPT

## 2023-12-08 PROCEDURE — 6360000002 HC RX W HCPCS: Performed by: NURSE ANESTHETIST, CERTIFIED REGISTERED

## 2023-12-08 PROCEDURE — 6370000000 HC RX 637 (ALT 250 FOR IP): Performed by: STUDENT IN AN ORGANIZED HEALTH CARE EDUCATION/TRAINING PROGRAM

## 2023-12-08 PROCEDURE — 2500000003 HC RX 250 WO HCPCS: Performed by: NURSE ANESTHETIST, CERTIFIED REGISTERED

## 2023-12-08 PROCEDURE — 88312 SPECIAL STAINS GROUP 1: CPT

## 2023-12-08 PROCEDURE — 2580000003 HC RX 258: Performed by: STUDENT IN AN ORGANIZED HEALTH CARE EDUCATION/TRAINING PROGRAM

## 2023-12-08 RX ORDER — LIDOCAINE HYDROCHLORIDE 20 MG/ML
INJECTION, SOLUTION EPIDURAL; INFILTRATION; INTRACAUDAL; PERINEURAL PRN
Status: DISCONTINUED | OUTPATIENT
Start: 2023-12-08 | End: 2023-12-08 | Stop reason: SDUPTHER

## 2023-12-08 RX ORDER — PANTOPRAZOLE SODIUM 40 MG/10ML
40 INJECTION, POWDER, LYOPHILIZED, FOR SOLUTION INTRAVENOUS 2 TIMES DAILY
Status: DISCONTINUED | OUTPATIENT
Start: 2023-12-09 | End: 2023-12-09 | Stop reason: HOSPADM

## 2023-12-08 RX ORDER — SODIUM CHLORIDE, SODIUM LACTATE, POTASSIUM CHLORIDE, CALCIUM CHLORIDE 600; 310; 30; 20 MG/100ML; MG/100ML; MG/100ML; MG/100ML
INJECTION, SOLUTION INTRAVENOUS ONCE
Status: COMPLETED | OUTPATIENT
Start: 2023-12-08 | End: 2023-12-08

## 2023-12-08 RX ORDER — SUCRALFATE ORAL 1 G/10ML
1 SUSPENSION ORAL EVERY 6 HOURS SCHEDULED
Status: DISCONTINUED | OUTPATIENT
Start: 2023-12-08 | End: 2023-12-09 | Stop reason: HOSPADM

## 2023-12-08 RX ORDER — PROPOFOL 10 MG/ML
INJECTION, EMULSION INTRAVENOUS PRN
Status: DISCONTINUED | OUTPATIENT
Start: 2023-12-08 | End: 2023-12-08 | Stop reason: SDUPTHER

## 2023-12-08 RX ADMIN — PANTOPRAZOLE SODIUM 40 MG: 40 INJECTION, POWDER, FOR SOLUTION INTRAVENOUS at 17:46

## 2023-12-08 RX ADMIN — HYDROXYZINE HYDROCHLORIDE 25 MG: 25 TABLET ORAL at 17:50

## 2023-12-08 RX ADMIN — ATOMOXETINE 60 MG: 60 CAPSULE ORAL at 09:43

## 2023-12-08 RX ADMIN — SUCRALFATE 1 G: 1 SUSPENSION ORAL at 18:49

## 2023-12-08 RX ADMIN — METOPROLOL TARTRATE 25 MG: 25 TABLET, FILM COATED ORAL at 09:10

## 2023-12-08 RX ADMIN — ATORVASTATIN CALCIUM 40 MG: 40 TABLET, FILM COATED ORAL at 21:14

## 2023-12-08 RX ADMIN — METOPROLOL TARTRATE 25 MG: 25 TABLET, FILM COATED ORAL at 21:12

## 2023-12-08 RX ADMIN — SODIUM CHLORIDE, PRESERVATIVE FREE 10 ML: 5 INJECTION INTRAVENOUS at 09:42

## 2023-12-08 RX ADMIN — SUCRALFATE 1 G: 1 SUSPENSION ORAL at 23:43

## 2023-12-08 RX ADMIN — TIZANIDINE 4 MG: 4 TABLET ORAL at 21:12

## 2023-12-08 RX ADMIN — SODIUM CHLORIDE, POTASSIUM CHLORIDE, SODIUM LACTATE AND CALCIUM CHLORIDE: 600; 310; 30; 20 INJECTION, SOLUTION INTRAVENOUS at 14:50

## 2023-12-08 RX ADMIN — TRAZODONE HYDROCHLORIDE 200 MG: 100 TABLET ORAL at 21:11

## 2023-12-08 RX ADMIN — AMLODIPINE BESYLATE 5 MG: 5 TABLET ORAL at 09:10

## 2023-12-08 RX ADMIN — ALUMINUM HYDROXIDE, MAGNESIUM HYDROXIDE, AND SIMETHICONE: 1200; 120; 1200 SUSPENSION ORAL at 17:45

## 2023-12-08 RX ADMIN — SODIUM CHLORIDE, PRESERVATIVE FREE 10 ML: 5 INJECTION INTRAVENOUS at 23:43

## 2023-12-08 RX ADMIN — LIDOCAINE HYDROCHLORIDE 50 MG: 20 INJECTION, SOLUTION EPIDURAL; INFILTRATION; INTRACAUDAL; PERINEURAL at 16:28

## 2023-12-08 RX ADMIN — HYDROXYZINE HYDROCHLORIDE 25 MG: 25 TABLET ORAL at 09:10

## 2023-12-08 RX ADMIN — PROPOFOL 100 MG: 10 INJECTION, EMULSION INTRAVENOUS at 16:28

## 2023-12-08 ASSESSMENT — PAIN - FUNCTIONAL ASSESSMENT
PAIN_FUNCTIONAL_ASSESSMENT: NONE - DENIES PAIN
PAIN_FUNCTIONAL_ASSESSMENT: ACTIVITIES ARE NOT PREVENTED
PAIN_FUNCTIONAL_ASSESSMENT: NONE - DENIES PAIN

## 2023-12-08 ASSESSMENT — PAIN DESCRIPTION - LOCATION
LOCATION: BACK
LOCATION: CHEST

## 2023-12-08 ASSESSMENT — PAIN DESCRIPTION - FREQUENCY: FREQUENCY: CONTINUOUS

## 2023-12-08 ASSESSMENT — PAIN SCALES - GENERAL
PAINLEVEL_OUTOF10: 6
PAINLEVEL_OUTOF10: 6

## 2023-12-08 ASSESSMENT — PAIN DESCRIPTION - PAIN TYPE: TYPE: CHRONIC PAIN

## 2023-12-08 ASSESSMENT — PAIN DESCRIPTION - DIRECTION: RADIATING_TOWARDS: NO

## 2023-12-08 ASSESSMENT — PAIN DESCRIPTION - ORIENTATION: ORIENTATION: LOWER

## 2023-12-08 ASSESSMENT — PAIN DESCRIPTION - DESCRIPTORS: DESCRIPTORS: DISCOMFORT

## 2023-12-08 ASSESSMENT — PAIN DESCRIPTION - ONSET: ONSET: ON-GOING

## 2023-12-08 NOTE — PROGRESS NOTES
Hospital Medicine Progress Note      Date of Admission: 12/6/2023  Hospital Day: 3    Chief Admission Complaint: Chest pain     Subjective: Patient was seen and evaluated at bedside. Reported mild ongoing central chest discomfort, improved from yesterday. Did not have any events overnight. Presenting Admission History:        53-year-old female with past medical history of anxiety, GERD, ADHD who presents to the ED with complaints of chest pain. Patient was in her usual state of health until about a week ago when she began to experience chest pain which was retrosternal, pressure-like, severe [8/10], nonradiating, worsened with exertion, relieved by rest, associated with palpitations and lightheadedness. Patient also had 1 episode of nausea with vomiting PERRL, conjunctiva normal. Of note, patient states she recently stopped taking her BuSpar due to concerns it was \"making her wild\". Due to persistence of symptoms patient presented to ED. Evaluation in the ED was remarkable for tachycardia, elevated creatinine 1.4 from baseline around 1, elevated troponins 68, downtrending to 43 on repeat, leukocytosis 16.5, EKG showed sinus tachycardia with no ST elevations or depressions, CTA chest showed no pulmonary embolus, did reveal diffuse esophageal wall thickening, concerning for esophagitis.     Assessment/Plan:      Chest pain  Elevated troponin  Esophageal wall thickening  CALI, resolved  ADD  Hypertension  Nicotine dependence    Plan:  *Troponins trended, 43, 32  No ST-T wave changes on EKG  Transthoracic echo shows an EF of 60% without wall motion abnormality  Cardiology on consult, no plans for stress test    *Incidental finding of esophageal wall thickening concerning for esophagitis  Start IV PPI therapy  Maalox 4 times daily  GI consult for possible endoscopic evaluation    *Presented with CALI, resolved    *Recently discontinued taking BuSpar  Continue trazodone, atomoxetine as at home    *Monitor blood oral replacement **OR** potassium chloride, magnesium sulfate, ondansetron **OR** ondansetron, acetaminophen **OR** acetaminophen, polyethylene glycol, perflutren lipid microspheres, nitroGLYCERIN, hydrOXYzine HCl, tiZANidine     Labs:  Personally reviewed and interpreted for clinical significance. Recent Labs     12/06/23  1108 12/07/23  1003   WBC 16.5* 8.5   HGB 14.8 14.0   HCT 43.9 42.1    173     Recent Labs     12/06/23  1108 12/07/23  1004    136   K 3.5 4.0   CL 97* 102   CO2 26 24   BUN 21* 15   CREATININE 1.4* 0.9   CALCIUM 9.8 8.8   MG 2.00  --      Recent Labs     12/06/23  1108 12/06/23  1415 12/06/23  1842   PROBNP 467*  --   --    TROPHS 68* 43* 32*  33*     No results for input(s): \"LABA1C\" in the last 72 hours. Recent Labs     12/07/23  1004   AST 19   ALT 12   BILITOT 0.3   ALKPHOS 62     No results for input(s): \"INR\", \"LACTA\", \"TSH\" in the last 72 hours. Urine Cultures: No results found for: \"LABURIN\"  Blood Cultures:   Lab Results   Component Value Date/Time    [facility]  12/06/2023 06:42 PM     No Growth to date. Any change in status will be called. Lab Results   Component Value Date/Time    BLOODCULT2  12/06/2023 06:42 PM     No Growth to date. Any change in status will be called. Organism:   Lab Results   Component Value Date/Time    ORG Escherichia coli DNA Detected 09/24/2022 03:17 PM    ORG Escherichia coli 09/24/2022 03:17 PM         Cecilia Gonzalez MD   Please note: All documentation, including physical exam and assessment/plan, from prior was copied and carried forward from encounter on 12/7/2023. New findings and changes have been reviewed and updated appropriately to reflect today's encounter.

## 2023-12-08 NOTE — ANESTHESIA PRE PROCEDURE
Department of Anesthesiology  Preprocedure Note       Name:  Luis Antonio Chung   Age:  70 y.o.  :  1960                                          MRN:  [id number]         Date:  2023      Surgeon: Paula Hogue):  Martina Patel MD    Procedure: Procedure(s):  ESOPHAGOGASTRODUODENOSCOPY    Medications prior to admission:   Prior to Admission medications    Medication Sig Start Date End Date Taking?  Authorizing Provider   atomoxetine (STRATTERA) 60 MG capsule Take 1 capsule by mouth daily 23  Yes Mary Luther MD   colestipol (COLESTID) 1 g tablet Take 3 tablets by mouth 2 times daily 10/31/23  Yes Mary Luther MD   mirabegron (MYRBETRIQ) 25 MG TB24 Take 1 tablet by mouth daily 23  Yes Mary Luther MD   metoprolol succinate (TOPROL XL) 50 MG extended release tablet Take 0.5 tablets by mouth daily for 15 days 23  Nikita Arreguin MD   busPIRone (BUSPAR) 5 MG tablet Take 5 mg by mouth 3 times daily  Patient not taking: Reported on 2023    Mary Luther MD   mesalamine (DELZICOL) 800 MG TBEC TBEC tablet Take 1 tablet by mouth 3 times daily 22   Mary Luther MD   simvastatin (ZOCOR) 20 MG tablet Take 1 tablet by mouth nightly 6/10/20 12/6/23  Mary Luther MD   tiZANidine (ZANAFLEX) 4 MG capsule Take 1 capsule by mouth 3 times daily as needed for Muscle spasms    Mary Luther MD   aspirin 81 MG chewable tablet Take 1 tablet by mouth daily    Mary Luther MD   amLODIPine (NORVASC) 5 MG tablet Take 1 tablet by mouth daily    Mary Luhter MD   traZODone (DESYREL) 100 MG tablet Take 2 tablets by mouth nightly    Mary Luther MD   escitalopram (LEXAPRO) 10 MG tablet Take 10 mg by mouth daily  Patient not taking: Reported on 2023    Mary Luther MD   pantoprazole (PROTONIX) 40 MG tablet Take 1 tablet by mouth 2 times daily    Mary Luther MD       Current medications:

## 2023-12-08 NOTE — PLAN OF CARE
Problem: Safety - Adult  Goal: Free from fall injury  12/8/2023 0344 by Vinh Chung RN  Outcome: Progressing  12/7/2023 1529 by Fei Cotto, RN  Outcome: Progressing   Pt remained safe through the night

## 2023-12-08 NOTE — PROCEDURES
EGD REPORT    Patient:  Patricia Escobedo                  1960    Referring Physician:  Lin Riggins    Endoscopist: Jenni Borwne     Indication:  Odynophagia; abnormal CT; chest pain     Medications:  MAC      Pre-Anesthesia Assessment:  I have reviewed and am in agreement with patient history and medication, including previous response to sedation. Prior to the procedure, a History and Physical was performed, and patient medications and allergies were reviewed. The patient is competent. The risks and benefits of the procedure and the sedation options and risks were discussed with the patient. Risks discussed included but were not limited to infection, bleeding, perforation, death, and missed lesions. All questions were answered and informed consent was obtained. Patient identification and proposed procedure were verified by the physician and the nurse in the pre-procedure area in the procedure room. [place]: II  ASA Grade Assessment: 3     After reviewing the risks and benefits, the patient was deemed in satisfactory condition to undergo the procedure. The anesthesia plan was to use MAC anesthesia. Immediately prior to administration of medications, the patient was re-assessed for adequacy to receive sedatives and a time out was performed. Patient and healthcare providers were in agreement it was the correct patient and procedure. The heart rate, respiratory rate, oxygen saturations, blood pressure, adequacy of pulmonary ventilation, and response to care were monitored throughout the procedure. The physical status of the patient was re-assessed after the procedure. After obtaining informed consent, the endoscope was passed under direct vision. The endoscope was introduced through the mouth, and advanced to the second part of duodenum. The EGD was accomplished without difficulty. The patient tolerated the procedure well.        Duodenum: Normal aside from scattered small erosions in the

## 2023-12-08 NOTE — PLAN OF CARE
Problem: Chronic Conditions and Co-morbidities  Goal: Patient's chronic conditions and co-morbidity symptoms are monitored and maintained or improved  Outcome: Progressing     Problem: Discharge Planning  Goal: Discharge to home or other facility with appropriate resources  Outcome: Progressing     Problem: Pain  Goal: Verbalizes/displays adequate comfort level or baseline comfort level  Outcome: Progressing  Patient pain manage with meds     Problem: Safety - Adult  Goal: Free from fall injury  12/8/2023 1307 by Cheikh Bains RN  Outcome: Progressing  12/8/2023 0344 by Lloyd Nichols RN  Outcome: Progressing   Patient free from falls this shift.

## 2023-12-09 VITALS
RESPIRATION RATE: 16 BRPM | OXYGEN SATURATION: 94 % | TEMPERATURE: 98.2 F | BODY MASS INDEX: 24.21 KG/M2 | HEART RATE: 104 BPM | WEIGHT: 141.8 LBS | HEIGHT: 64 IN | SYSTOLIC BLOOD PRESSURE: 146 MMHG | DIASTOLIC BLOOD PRESSURE: 90 MMHG

## 2023-12-09 PROBLEM — R07.9 CHEST PAIN: Status: RESOLVED | Noted: 2023-12-06 | Resolved: 2023-12-09

## 2023-12-09 LAB
EKG ATRIAL RATE: 94 BPM
EKG DIAGNOSIS: NORMAL
EKG P AXIS: 41 DEGREES
EKG P-R INTERVAL: 124 MS
EKG Q-T INTERVAL: 390 MS
EKG QRS DURATION: 78 MS
EKG QTC CALCULATION (BAZETT): 487 MS
EKG R AXIS: -2 DEGREES
EKG T AXIS: 36 DEGREES
EKG VENTRICULAR RATE: 94 BPM

## 2023-12-09 PROCEDURE — 6360000002 HC RX W HCPCS: Performed by: INTERNAL MEDICINE

## 2023-12-09 PROCEDURE — 2580000003 HC RX 258: Performed by: INTERNAL MEDICINE

## 2023-12-09 PROCEDURE — 6370000000 HC RX 637 (ALT 250 FOR IP): Performed by: NURSE PRACTITIONER

## 2023-12-09 PROCEDURE — 6370000000 HC RX 637 (ALT 250 FOR IP): Performed by: INTERNAL MEDICINE

## 2023-12-09 PROCEDURE — C9113 INJ PANTOPRAZOLE SODIUM, VIA: HCPCS | Performed by: INTERNAL MEDICINE

## 2023-12-09 PROCEDURE — 93005 ELECTROCARDIOGRAM TRACING: CPT | Performed by: STUDENT IN AN ORGANIZED HEALTH CARE EDUCATION/TRAINING PROGRAM

## 2023-12-09 PROCEDURE — 93010 ELECTROCARDIOGRAM REPORT: CPT | Performed by: INTERNAL MEDICINE

## 2023-12-09 RX ORDER — HYDROXYZINE HYDROCHLORIDE 25 MG/1
25 TABLET, FILM COATED ORAL 3 TIMES DAILY PRN
Qty: 21 TABLET | Refills: 0 | Status: SHIPPED | OUTPATIENT
Start: 2023-12-09 | End: 2023-12-23

## 2023-12-09 RX ORDER — SUCRALFATE ORAL 1 G/10ML
1 SUSPENSION ORAL 3 TIMES DAILY
Qty: 210 ML | Refills: 0 | Status: SHIPPED | OUTPATIENT
Start: 2023-12-09 | End: 2023-12-16

## 2023-12-09 RX ADMIN — NYSTATIN 5 ML: 100000 SUSPENSION ORAL at 10:50

## 2023-12-09 RX ADMIN — HYDROXYZINE HYDROCHLORIDE 25 MG: 25 TABLET ORAL at 10:48

## 2023-12-09 RX ADMIN — ATOMOXETINE 60 MG: 60 CAPSULE ORAL at 09:15

## 2023-12-09 RX ADMIN — AMLODIPINE BESYLATE 5 MG: 5 TABLET ORAL at 09:12

## 2023-12-09 RX ADMIN — SODIUM CHLORIDE, PRESERVATIVE FREE 10 ML: 5 INJECTION INTRAVENOUS at 09:12

## 2023-12-09 RX ADMIN — NYSTATIN 5 ML: 100000 SUSPENSION ORAL at 06:17

## 2023-12-09 RX ADMIN — ASPIRIN 81 MG: 81 TABLET, CHEWABLE ORAL at 09:11

## 2023-12-09 RX ADMIN — HYDROXYZINE HYDROCHLORIDE 25 MG: 25 TABLET ORAL at 03:56

## 2023-12-09 RX ADMIN — METOPROLOL TARTRATE 25 MG: 25 TABLET, FILM COATED ORAL at 09:12

## 2023-12-09 RX ADMIN — SUCRALFATE 1 G: 1 SUSPENSION ORAL at 06:18

## 2023-12-09 RX ADMIN — ENOXAPARIN SODIUM 40 MG: 100 INJECTION SUBCUTANEOUS at 09:11

## 2023-12-09 RX ADMIN — PANTOPRAZOLE SODIUM 40 MG: 40 INJECTION, POWDER, FOR SOLUTION INTRAVENOUS at 09:11

## 2023-12-09 RX ADMIN — ALUMINUM HYDROXIDE, MAGNESIUM HYDROXIDE, AND SIMETHICONE: 200; 200; 20 SUSPENSION ORAL at 06:19

## 2023-12-09 NOTE — ANESTHESIA POSTPROCEDURE EVALUATION
Department of Anesthesiology  Postprocedure Note    Patient: Perfecto Mahoney  MRN: [id number]  YOB: 1960  Date of evaluation: 12/8/2023      Procedure Summary       Date: 12/08/23 Room / Location: [facility] / [facility] [place] [address] [facility]    Anesthesia Start: 1626 Anesthesia Stop: 8906    Procedure: ESOPHAGOGASTRODUODENOSCOPY with biopsy Diagnosis:       Atypical chest pain      (Atypical chest pain [R07.89])    Surgeons: Isabelle Landin MD Responsible Provider: Vargas Coronel MD    Anesthesia Type: general ASA Status: 3            Anesthesia Type: No value filed.     Ирина Phase I:      Ирина Phase II: Ирина Score: 10      Anesthesia Post Evaluation    Patient location during evaluation: PACU  Patient participation: complete - patient participated  Level of consciousness: awake and alert  Pain score: 0  Airway patency: patent  Nausea & Vomiting: no nausea and no vomiting  Complications: no  Cardiovascular status: hemodynamically stable  Respiratory status: acceptable  Hydration status: euvolemic  Pain management: adequate

## 2023-12-09 NOTE — PLAN OF CARE
Problem: Chronic Conditions and Co-morbidities  Goal: Patient's chronic conditions and co-morbidity symptoms are monitored and maintained or improved  12/8/2023 2229 by Luis Antonio Lo RN  Flowsheets (Taken 12/8/2023 2229)  Care Plan - Patient's Chronic Conditions and Co-Morbidity Symptoms are Monitored and Maintained or Improved: Monitor and assess patient's chronic conditions and comorbid symptoms for stability, deterioration, or improvement     Problem: Discharge Planning  Goal: Discharge to home or other facility with appropriate resources  12/8/2023 2229 by Luis Antonio Lo RN  Outcome: Progressing  Flowsheets (Taken 12/8/2023 2229)  Discharge to home or other facility with appropriate resources:   Identify barriers to discharge with patient and caregiver   Identify discharge learning needs (meds, wound care, etc)     Problem: Pain  Goal: Verbalizes/displays adequate comfort level or baseline comfort level  12/8/2023 2229 by Luis Antonio Lo RN  Outcome: Progressing  Flowsheets (Taken 12/8/2023 2229)  Verbalizes/displays adequate comfort level or baseline comfort level:   Encourage patient to monitor pain and request assistance   Administer analgesics based on type and severity of pain and evaluate response     Problem: Safety - Adult  Goal: Free from fall injury  12/8/2023 2229 by Luis Antonio Lo RN  Outcome: Progressing  Flowsheets (Taken 12/8/2023 2229)  Free From Fall Injury: Instruct family/caregiver on patient safety

## 2023-12-09 NOTE — PROGRESS NOTES
Patient discharged home. IV removed. Tele removed. Discharge paperwork discussed with patient. Denies any questions. Prescriptions sent to patient's pharmacy, verbalized understanding. Walked patient down to family vehicle for discharge.

## 2023-12-09 NOTE — DISCHARGE SUMMARY
V2.0  Discharge Summary    Name:  Amos Frausto /Age/Sex: 1960 (5 y.o. female)   Admit Date: 2023  Discharge Date: 23    MRN & CSN:  [id number] & [id number] Encounter Date and Time 23 12:30 PM EST    Attending:  Tal Judd MD Discharging Provider: Tal Judd MD       Hospital Course:     Brief HPI: Amos Frausto is [id number] female with past medical history of anxiety, GERD, ADHD who presents to the ED with complaints of chest pain. Patient was in her usual state of health until about a week ago when she began to experience chest pain which was retrosternal, pressure-like, severe [8/10], nonradiating, worsened with exertion, relieved by rest, associated with palpitations and lightheadedness. Patient also had 1 episode of nausea with vomiting PERRL, conjunctiva normal. Of note, patient states she recently stopped taking her BuSpar due to concerns it was \"making her wild\". Due to persistence of symptoms patient presented to ED. Evaluation in the ED was remarkable for tachycardia, elevated creatinine 1.4 from baseline around 1, elevated troponins 68, downtrending to 43 on repeat, leukocytosis 16.5, EKG showed sinus tachycardia with no ST elevations or depressions, CTA chest showed no pulmonary embolus, did reveal diffuse esophageal wall thickening, concerning for esophagitis. Brief Problem Based Course:   Chest pain  Elevated troponin  Esophageal wall thickening  CALI, resolved  ADD  Hypertension  Nicotine dependence     Plan:  *Troponins trended, 43, 32  No ST-T wave changes on EKG  Transthoracic echo shows an EF of 60% without wall motion abnormality  Cardiology on consult, no plans for stress test     *Incidental finding of esophageal wall thickening concerning for esophagitis  Start IV PPI therapy  Maalox 4 times daily  EGD on 2023 showed small duodenal erosions, erythema of the stomach, severe esophagitis.   Recommended PPI twice daily, Carafate for 1 week, mouth daily for 15 days     mirabegron 25 MG Tb24  Commonly known as: MYRBETRIQ     pantoprazole 40 MG tablet  Commonly known as: PROTONIX     simvastatin 20 MG tablet  Commonly known as: ZOCOR     tiZANidine 4 MG capsule  Commonly known as: ZANAFLEX     traZODone 100 MG tablet  Commonly known as: DESYREL            ASK your doctor about these medications      busPIRone 5 MG tablet  Commonly known as: BUSPAR     escitalopram 10 MG tablet  Commonly known as: LEXAPRO               Where to Get Your Medications        These medications were sent to [facility] [id number] - [place], [place] - [address] - P [phone] - F [phone]  [address] [facility], [place] [address]      Phone: [phone]   hydrOXYzine HCl 25 MG tablet  Magic Mouthwash   sucralfate 1 GM/10ML suspension        Objective Findings at Discharge:   BP (!) 146/90   Pulse (!) 104   Temp 98.2 °F (36.8 °C) (Oral)   Resp 16   Ht 1.626 m (5' 4\")   Wt 64.3 kg (141 lb 12.8 oz)   SpO2 94%   BMI 24.34 kg/m²       Physical Exam:     General: No distress, alert and oriented x3,  Cardiac: S1 plus S2 regular rate and rhythm, no murmurs rubs or gallops  Respiratory: No wheeze or crackles appreciated, equal air entry bilaterally  GI/: Abdomen soft, nontender, bowel sounds audible  Skin: No rashes or ulcers present  MSK: Peripheral pulses intact        Labs and Imaging   Colonoscopy    Result Date: 12/8/2023  No dictation     EGD    Result Date: 12/8/2023  No dictation     CTA Chest W/ Contrast R/O PE    Result Date: 12/6/2023  EXAM: CTA CHEST W CONTRAST INDICATION: Pulmonary Embolism COMPARISON: 9/24/2022 TECHNIQUE: IV Contrast Media and volume: 80 cc OMNI 350 Helically acquired pulmonary embolism protocol. MIP reformats were submitted. Up-to-date CT equipment and radiation dose reduction techniques were employed. 3-dimensional vascular reconstructions were also provided for review. FINDINGS: Diagnostic quality: Adequate. Pulmonary emboli: None. Right heart  strain: None. Lungs: Evaluation of the pulmonary parenchyma is limited due to respiratory motion artifact. Airways: Normal. Heart/Great Vessels: The heart is stable in size. Enlargement of the main pulmonary artery to 3.5 cm, similar to prior. . Adenopathy: None. Upper Abdomen: Diffuse esophageal wall thickening is noted. Bones: No significant abnormality. Other findings: None. 1. No evidence of pulmonary embolus. 2. Diffuse esophageal wall thickening, which can be seen in the setting of esophagitis. XR CHEST (2 VW)    Result Date: 12/6/2023  XR CHEST (2 VW) Indication: chest pain COMPARISON: 9/26/2022 Findings: PA and lateral views of the chest were obtained. The heart is normal in size and configuration. The lungs are clear. No effusion or pneumothorax. Status post ORIF of the left clavicle. Healed left-sided rib fractures are noted. No acute cardiopulmonary abnormality. CBC:   Recent Labs     12/07/23  1003   WBC 8.5   HGB 14.0        BMP:    Recent Labs     12/07/23  1004      K 4.0      CO2 24   BUN 15   CREATININE 0.9   GLUCOSE 92     Hepatic:   Recent Labs     12/07/23  1004   AST 19   ALT 12   BILITOT 0.3   ALKPHOS 62     Lipids:   Lab Results   Component Value Date/Time    CHOL 111 12/07/2023 10:04 AM    HDL 51 12/07/2023 10:04 AM    TRIG 104 12/07/2023 10:04 AM     Hemoglobin A1C: No results found for: \"LABA1C\"  TSH: No results found for: \"TSH\"  Troponin: No results found for: \"TROPONINT\"  Lactic Acid: No results for input(s): \"LACTA\" in the last 72 hours. BNP: No results for input(s): \"PROBNP\" in the last 72 hours.   UA:  Lab Results   Component Value Date/Time    NITRU Negative 12/06/2023 11:08 AM    COLORU Yellow 12/06/2023 11:08 AM    PHUR 6.0 12/07/2023 11:00 AM    WBCUA 3-5 12/06/2023 11:08 AM    RBCUA 0-2 12/06/2023 11:08 AM    BACTERIA 2+ 12/06/2023 11:08 AM    CLARITYU Clear 12/06/2023 11:08 AM    SPECGRAV 1.020 12/06/2023 11:08 AM    LEUKOCYTESUR Negative 12/06/2023 11:08 AM    UROBILINOGEN 0.2 12/06/2023 11:08 AM    BILIRUBINUR Negative 12/06/2023 11:08 AM    BLOODU Negative 12/06/2023 11:08 AM    GLUCOSEU Negative 12/06/2023 11:08 AM    KETUA Negative 12/06/2023 11:08 AM    AMORPHOUS Rare 09/24/2022 06:43 PM     Urine Cultures: No results found for: \"LABURIN\"  Blood Cultures:   Lab Results   Component Value Date/Time    [facility]  12/06/2023 06:42 PM     No Growth to date. Any change in status will be called. Lab Results   Component Value Date/Time    BLOODCULT2  12/06/2023 06:42 PM     No Growth to date. Any change in status will be called.      Organism:   Lab Results   Component Value Date/Time    ORG Escherichia coli DNA Detected 09/24/2022 03:17 PM    ORG Escherichia coli 09/24/2022 03:17 PM       Time Spent Discharging patient >35 minutes    Electronically signed by Daniel Nassar MD on 12/9/2023 at 12:30 PM

## 2023-12-10 LAB
BACTERIA BLD CULT ORG #2: NORMAL
BACTERIA BLD CULT: NORMAL

## 2023-12-11 ENCOUNTER — TELEPHONE (OUTPATIENT)
Dept: SURGERY | Age: 63
End: 2023-12-11

## 2023-12-11 NOTE — TELEPHONE ENCOUNTER
LM #2 to schedule CDS in the [facility] office on or after 2/23/24. Dr Ranjit Jaffe will call with the results.

## 2023-12-16 ENCOUNTER — APPOINTMENT (OUTPATIENT)
Dept: GENERAL RADIOLOGY | Age: 63
End: 2023-12-16
Payer: COMMERCIAL

## 2023-12-16 ENCOUNTER — HOSPITAL ENCOUNTER (EMERGENCY)
Age: 63
Discharge: HOME | End: 2023-12-16
Attending: EMERGENCY MEDICINE
Payer: COMMERCIAL

## 2023-12-16 VITALS
SYSTOLIC BLOOD PRESSURE: 164 MMHG | BODY MASS INDEX: 24.07 KG/M2 | TEMPERATURE: 98.4 F | RESPIRATION RATE: 24 BRPM | HEIGHT: 64 IN | HEART RATE: 71 BPM | OXYGEN SATURATION: 98 % | WEIGHT: 141 LBS | DIASTOLIC BLOOD PRESSURE: 79 MMHG

## 2023-12-16 DIAGNOSIS — K20.90 ESOPHAGITIS: ICD-10-CM

## 2023-12-16 DIAGNOSIS — R11.2 NAUSEA AND VOMITING, UNSPECIFIED VOMITING TYPE: Primary | ICD-10-CM

## 2023-12-16 LAB
ALBUMIN SERPL-MCNC: 4.1 G/DL (ref 3.4–5)
ALBUMIN/GLOB SERPL: 1.3 {RATIO} (ref 1.1–2.2)
ALP SERPL-CCNC: 68 U/L (ref 40–129)
ALT SERPL-CCNC: 16 U/L (ref 10–40)
ANION GAP SERPL CALCULATED.3IONS-SCNC: 12 MMOL/L (ref 3–16)
AST SERPL-CCNC: 22 U/L (ref 15–37)
BASOPHILS # BLD: 0 K/UL (ref 0–0.2)
BASOPHILS NFR BLD: 0.5 %
BILIRUB SERPL-MCNC: 0.3 MG/DL (ref 0–1)
BUN SERPL-MCNC: 12 MG/DL (ref 7–20)
CALCIUM SERPL-MCNC: 9.3 MG/DL (ref 8.3–10.6)
CHLORIDE SERPL-SCNC: 102 MMOL/L (ref 99–110)
CO2 SERPL-SCNC: 27 MMOL/L (ref 21–32)
CREAT SERPL-MCNC: 1.1 MG/DL (ref 0.6–1.2)
DEPRECATED RDW RBC AUTO: 13.5 % (ref 12.4–15.4)
EOSINOPHIL # BLD: 0.1 K/UL (ref 0–0.6)
EOSINOPHIL NFR BLD: 0.6 %
GFR SERPLBLD CREATININE-BSD FMLA CKD-EPI: 56 ML/MIN/{1.73_M2}
GLUCOSE SERPL-MCNC: 136 MG/DL (ref 70–99)
HCT VFR BLD AUTO: 41.8 % (ref 36–48)
HGB BLD-MCNC: 14.1 G/DL (ref 12–16)
LIPASE SERPL-CCNC: 46 U/L (ref 13–60)
LYMPHOCYTES # BLD: 1.1 K/UL (ref 1–5.1)
LYMPHOCYTES NFR BLD: 11.8 %
MCH RBC QN AUTO: 30.3 PG (ref 26–34)
MCHC RBC AUTO-ENTMCNC: 33.6 G/DL (ref 31–36)
MCV RBC AUTO: 90.3 FL (ref 80–100)
MONOCYTES # BLD: 0.3 K/UL (ref 0–1.3)
MONOCYTES NFR BLD: 3.4 %
NEUTROPHILS # BLD: 8.1 K/UL (ref 1.7–7.7)
NEUTROPHILS NFR BLD: 83.7 %
PLATELET # BLD AUTO: 289 K/UL (ref 135–450)
PMV BLD AUTO: 7.1 FL (ref 5–10.5)
POTASSIUM SERPL-SCNC: 4 MMOL/L (ref 3.5–5.1)
PROT SERPL-MCNC: 7.2 G/DL (ref 6.4–8.2)
RBC # BLD AUTO: 4.63 M/UL (ref 4–5.2)
SODIUM SERPL-SCNC: 141 MMOL/L (ref 136–145)
TROPONIN, HIGH SENSITIVITY: 13 NG/L (ref 0–14)
TROPONIN, HIGH SENSITIVITY: 13 NG/L (ref 0–14)
WBC # BLD AUTO: 9.7 K/UL (ref 4–11)

## 2023-12-16 PROCEDURE — 84484 ASSAY OF TROPONIN QUANT: CPT

## 2023-12-16 PROCEDURE — 85025 COMPLETE CBC W/AUTO DIFF WBC: CPT

## 2023-12-16 PROCEDURE — 80053 COMPREHEN METABOLIC PANEL: CPT

## 2023-12-16 PROCEDURE — 6360000002 HC RX W HCPCS: Performed by: EMERGENCY MEDICINE

## 2023-12-16 PROCEDURE — 71046 X-RAY EXAM CHEST 2 VIEWS: CPT

## 2023-12-16 PROCEDURE — 83690 ASSAY OF LIPASE: CPT

## 2023-12-16 PROCEDURE — 6370000000 HC RX 637 (ALT 250 FOR IP): Performed by: EMERGENCY MEDICINE

## 2023-12-16 PROCEDURE — 36415 COLL VENOUS BLD VENIPUNCTURE: CPT

## 2023-12-16 PROCEDURE — C9113 INJ PANTOPRAZOLE SODIUM, VIA: HCPCS | Performed by: EMERGENCY MEDICINE

## 2023-12-16 RX ORDER — SUCRALFATE ORAL 1 G/10ML
1 SUSPENSION ORAL 3 TIMES DAILY
Qty: 210 ML | Refills: 0 | Status: SHIPPED | OUTPATIENT
Start: 2023-12-16 | End: 2023-12-23

## 2023-12-16 RX ORDER — PANTOPRAZOLE SODIUM 40 MG/10ML
40 INJECTION, POWDER, LYOPHILIZED, FOR SOLUTION INTRAVENOUS ONCE
Status: COMPLETED | OUTPATIENT
Start: 2023-12-16 | End: 2023-12-16

## 2023-12-16 RX ORDER — SUCRALFATE 1 G/1
1 TABLET ORAL ONCE
Status: COMPLETED | OUTPATIENT
Start: 2023-12-16 | End: 2023-12-16

## 2023-12-16 RX ORDER — PANTOPRAZOLE SODIUM 40 MG/1
40 TABLET, DELAYED RELEASE ORAL 2 TIMES DAILY
Qty: 30 TABLET | Refills: 0 | Status: SHIPPED | OUTPATIENT
Start: 2023-12-16

## 2023-12-16 RX ORDER — DROPERIDOL 2.5 MG/ML
1.25 INJECTION, SOLUTION INTRAMUSCULAR; INTRAVENOUS ONCE
Status: COMPLETED | OUTPATIENT
Start: 2023-12-16 | End: 2023-12-16

## 2023-12-16 RX ADMIN — ALUMINUM HYDROXIDE, MAGNESIUM HYDROXIDE, AND SIMETHICONE: 200; 200; 20 SUSPENSION ORAL at 17:28

## 2023-12-16 RX ADMIN — SUCRALFATE 1 G: 1 TABLET ORAL at 18:41

## 2023-12-16 RX ADMIN — PANTOPRAZOLE SODIUM 40 MG: 40 INJECTION, POWDER, FOR SOLUTION INTRAVENOUS at 17:28

## 2023-12-16 RX ADMIN — DROPERIDOL 1.25 MG: 2.5 INJECTION, SOLUTION INTRAMUSCULAR; INTRAVENOUS at 18:41

## 2023-12-16 ASSESSMENT — PAIN DESCRIPTION - ORIENTATION: ORIENTATION: MID;UPPER

## 2023-12-16 ASSESSMENT — PAIN SCALES - GENERAL: PAINLEVEL_OUTOF10: 10

## 2023-12-16 ASSESSMENT — PAIN DESCRIPTION - LOCATION: LOCATION: ABDOMEN

## 2023-12-16 ASSESSMENT — PAIN DESCRIPTION - PAIN TYPE: TYPE: ACUTE PAIN

## 2023-12-16 ASSESSMENT — PAIN DESCRIPTION - DESCRIPTORS: DESCRIPTORS: CRAMPING

## 2023-12-16 ASSESSMENT — PAIN - FUNCTIONAL ASSESSMENT: PAIN_FUNCTIONAL_ASSESSMENT: 0-10

## 2023-12-16 NOTE — DISCHARGE INSTRUCTIONS
You were seen in the emergency department for abdominal and chest pain along with nausea and vomiting. This is likely an ongoing irritation of your stomach and esophagus. It is very important that you take the medications that have been prescribed to help with the symptoms. Specifically pantoprazole will help reduce acid in your stomach and sucralfate will help coat and soothe the stomach. Please get your prescriptions filled and take them as prescribed. Follow-up with the GI doctors at your currently scheduled appointment. Call 911 or go to the nearest Emergency Department immediately for worsening symptoms, difficulty breathing, chest pain, lightheadedness, difficulty moving or talking, sensory loss, difficulty controlling your bowel or bladder function, altered level of consciousness, neck stiffness, uncontrollable nausea or vomiting, uncontrollable pain, inability to tolerate oral intake, fever, chills, severe bleeding, signs of infection (spreading redness, area feels very warm, swelling, pain, foul-smelling drainage), suicidal or homicidal ideation, or any other concerns. If we have prescribed you any new medications, please take them as prescribed and read the drug package insert carefully. Do not drink alcohol, drive, or operate machinery if you are taking narcotic pain medications. Your condition requires follow-up with your primary care provider, Clara Noble, APRN - NP, within 7 days, please see above for details. Bring these discharge instructions with you when you see your doctor. Avoid all strenuous activity until you have checked with your primary care provider.

## 2023-12-16 NOTE — ED TRIAGE NOTES
Patient arrives to triage ambulatory with c/o nausea/vomiting that started two days ago. Patient also reports abdominal pain rated 10/10. Pt reports she was hospitalized recently for similar symptoms.

## 2023-12-17 LAB
EKG ATRIAL RATE: 77 BPM
EKG DIAGNOSIS: NORMAL
EKG P AXIS: 43 DEGREES
EKG P-R INTERVAL: 132 MS
EKG Q-T INTERVAL: 404 MS
EKG QRS DURATION: 82 MS
EKG QTC CALCULATION (BAZETT): 457 MS
EKG R AXIS: 5 DEGREES
EKG T AXIS: 46 DEGREES
EKG VENTRICULAR RATE: 77 BPM

## 2024-03-05 ENCOUNTER — TELEPHONE (OUTPATIENT)
Dept: VASCULAR SURGERY | Age: 64
End: 2024-03-05

## 2024-03-05 ENCOUNTER — PROCEDURE VISIT (OUTPATIENT)
Dept: VASCULAR SURGERY | Age: 64
End: 2024-03-05
Payer: MEDICARE

## 2024-03-05 DIAGNOSIS — I65.23 CAROTID ATHEROSCLEROSIS, BILATERAL: ICD-10-CM

## 2024-03-05 DIAGNOSIS — I65.23 CAROTID ATHEROSCLEROSIS, BILATERAL: Primary | ICD-10-CM

## 2024-03-05 PROCEDURE — 93880 EXTRACRANIAL BILAT STUDY: CPT | Performed by: SURGERY

## 2024-03-05 NOTE — TELEPHONE ENCOUNTER
Left message with results of carotid duplex which are stable.  Plan to repeat in 1 year (order entered).  Thanks.

## 2024-03-30 ENCOUNTER — HOSPITAL ENCOUNTER (EMERGENCY)
Age: 64
Discharge: HOME | End: 2024-03-30
Attending: STUDENT IN AN ORGANIZED HEALTH CARE EDUCATION/TRAINING PROGRAM
Payer: MEDICARE

## 2024-03-30 VITALS
HEIGHT: 64 IN | RESPIRATION RATE: 21 BRPM | OXYGEN SATURATION: 98 % | BODY MASS INDEX: 28.85 KG/M2 | DIASTOLIC BLOOD PRESSURE: 63 MMHG | TEMPERATURE: 98.1 F | HEART RATE: 61 BPM | SYSTOLIC BLOOD PRESSURE: 105 MMHG | WEIGHT: 169 LBS

## 2024-03-30 DIAGNOSIS — R11.2 NAUSEA AND VOMITING, UNSPECIFIED VOMITING TYPE: ICD-10-CM

## 2024-03-30 DIAGNOSIS — T50.901A MEDICATION OVERDOSE, ACCIDENTAL OR UNINTENTIONAL, INITIAL ENCOUNTER: Primary | ICD-10-CM

## 2024-03-30 LAB
ALBUMIN SERPL-MCNC: 3.8 G/DL (ref 3.4–5)
ALBUMIN/GLOB SERPL: 1.4 {RATIO} (ref 1.1–2.2)
ALP SERPL-CCNC: 77 U/L (ref 40–129)
ALT SERPL-CCNC: 13 U/L (ref 10–40)
ANION GAP SERPL CALCULATED.3IONS-SCNC: 13 MMOL/L (ref 3–16)
AST SERPL-CCNC: 21 U/L (ref 15–37)
BASOPHILS # BLD: 0.1 K/UL (ref 0–0.2)
BASOPHILS NFR BLD: 1.2 %
BILIRUB SERPL-MCNC: <0.2 MG/DL (ref 0–1)
BUN SERPL-MCNC: 18 MG/DL (ref 7–20)
CALCIUM SERPL-MCNC: 8.5 MG/DL (ref 8.3–10.6)
CHLORIDE SERPL-SCNC: 100 MMOL/L (ref 99–110)
CO2 SERPL-SCNC: 23 MMOL/L (ref 21–32)
CREAT SERPL-MCNC: 1.2 MG/DL (ref 0.6–1.2)
DEPRECATED RDW RBC AUTO: 14 % (ref 12.4–15.4)
EOSINOPHIL # BLD: 0.2 K/UL (ref 0–0.6)
EOSINOPHIL NFR BLD: 3.3 %
ETHANOLAMINE SERPL-MCNC: 39 MG/DL (ref 0–0.08)
GFR SERPLBLD CREATININE-BSD FMLA CKD-EPI: 50 ML/MIN/{1.73_M2}
GLUCOSE SERPL-MCNC: 99 MG/DL (ref 70–99)
HCT VFR BLD AUTO: 31.5 % (ref 36–48)
HGB BLD-MCNC: 10.4 G/DL (ref 12–16)
LYMPHOCYTES # BLD: 1.5 K/UL (ref 1–5.1)
LYMPHOCYTES NFR BLD: 32.4 %
MCH RBC QN AUTO: 28.6 PG (ref 26–34)
MCHC RBC AUTO-ENTMCNC: 32.9 G/DL (ref 31–36)
MCV RBC AUTO: 87 FL (ref 80–100)
MONOCYTES # BLD: 0.4 K/UL (ref 0–1.3)
MONOCYTES NFR BLD: 9.7 %
NEUTROPHILS # BLD: 2.4 K/UL (ref 1.7–7.7)
NEUTROPHILS NFR BLD: 53.4 %
PLATELET # BLD AUTO: 210 K/UL (ref 135–450)
PMV BLD AUTO: 7.3 FL (ref 5–10.5)
POTASSIUM SERPL-SCNC: 4.4 MMOL/L (ref 3.5–5.1)
PROT SERPL-MCNC: 6.5 G/DL (ref 6.4–8.2)
RBC # BLD AUTO: 3.63 M/UL (ref 4–5.2)
SODIUM SERPL-SCNC: 136 MMOL/L (ref 136–145)
WBC # BLD AUTO: 4.6 K/UL (ref 4–11)

## 2024-03-30 PROCEDURE — 2580000003 HC RX 258: Performed by: STUDENT IN AN ORGANIZED HEALTH CARE EDUCATION/TRAINING PROGRAM

## 2024-03-30 PROCEDURE — 99284 EMERGENCY DEPT VISIT MOD MDM: CPT

## 2024-03-30 PROCEDURE — 96360 HYDRATION IV INFUSION INIT: CPT

## 2024-03-30 PROCEDURE — 96361 HYDRATE IV INFUSION ADD-ON: CPT

## 2024-03-30 PROCEDURE — 82077 ASSAY SPEC XCP UR&BREATH IA: CPT

## 2024-03-30 PROCEDURE — 2580000003 HC RX 258

## 2024-03-30 PROCEDURE — 85025 COMPLETE CBC W/AUTO DIFF WBC: CPT

## 2024-03-30 PROCEDURE — 80053 COMPREHEN METABOLIC PANEL: CPT

## 2024-03-30 PROCEDURE — 93005 ELECTROCARDIOGRAM TRACING: CPT

## 2024-03-30 PROCEDURE — 36415 COLL VENOUS BLD VENIPUNCTURE: CPT

## 2024-03-30 RX ORDER — SODIUM CHLORIDE, SODIUM LACTATE, POTASSIUM CHLORIDE, AND CALCIUM CHLORIDE .6; .31; .03; .02 G/100ML; G/100ML; G/100ML; G/100ML
1000 INJECTION, SOLUTION INTRAVENOUS ONCE
Status: COMPLETED | OUTPATIENT
Start: 2024-03-30 | End: 2024-03-30

## 2024-03-30 RX ADMIN — SODIUM CHLORIDE, POTASSIUM CHLORIDE, SODIUM LACTATE AND CALCIUM CHLORIDE 1000 ML: 600; 310; 30; 20 INJECTION, SOLUTION INTRAVENOUS at 18:42

## 2024-03-30 RX ADMIN — SODIUM CHLORIDE, POTASSIUM CHLORIDE, SODIUM LACTATE AND CALCIUM CHLORIDE 1000 ML: 600; 310; 30; 20 INJECTION, SOLUTION INTRAVENOUS at 19:20

## 2024-03-30 ASSESSMENT — ENCOUNTER SYMPTOMS
NAUSEA: 1
ABDOMINAL PAIN: 0
VOMITING: 1

## 2024-03-30 NOTE — ED PROVIDER NOTES
THE [facility]  EMERGENCY DEPARTMENT ENCOUNTER          [facility] RESIDENT NOTE       Date of evaluation: 3/30/2024    Chief Complaint     Nausea, Emesis, and Dizziness (Pt coming from home for n/v after drinking 8 beers today. Pt has hx of gastric ulcers and has been throwing up since 1600. Pt tried to take night meds which is trazodone, tizanidine, pantoprazole, and simvastatin and then has episode of emesis so she took a second dose of all of those. Now c/o dizziness and feeling like she is going to pass out)      History of Present Illness     Cait Mendoza is a 63 y.o. female with a past medical history of severe esophagitis, alcohol use disorder, hypertension, anxiety, HCV who presents with nausea and vomiting.  Patient states that she ate too much today and started throwing up.  She threw up 3 times, mostly her food.  She also has been drinking 8 beers today.  She took her nighttime medications trazodone, tizanidine, PPI, simvastatin and vomited again so she took a second dose of all those medications.  She took 4 pills of 100 mg of trazodone, 6 pills of 1 mg tizanidine, 2 pills of 40 mg PPI, 2 pills of 20 mg simvastatin.  She also took amlodipine and metoprolol this morning. She is currently complaining of sleepiness and dizziness.  Currently denies any chest pain, abdominal pain, SOB, nausea or vomiting.     ASSESSMENT / PLAN  (MEDICAL DECISION MAKING)     INITIAL VITALS: BP: (!) 84/58, Temp: 98.1 °F (36.7 °C), Pulse: 74, Respirations: 16, SpO2: 96 %     Cait Mendoza is a 63 y.o. female with a past medical history of severe esophagitis, alcohol use disorder, hypertension, anxiety, HCV who presents with nausea and vomiting.  She drank 8 beers today, she usually drinks about 6 beers.  She states that she ate too much and started to throw up.  She took her nighttime medications  trazodone, tizanidine, PPI and simvastatin.  She took a second dose of all those medications again after throwing up  mouth daily for 15 days    MIRABEGRON (MYRBETRIQ) 25 MG TB24    Take 1 tablet by mouth daily    PANTOPRAZOLE (PROTONIX) 40 MG TABLET    Take 1 tablet by mouth 2 times daily    SIMVASTATIN (ZOCOR) 20 MG TABLET    Take 1 tablet by mouth nightly    SUCRALFATE (CARAFATE) 1 GM/10ML SUSPENSION    Take 10 mLs by mouth in the morning, at noon, and at bedtime for 7 days    TIZANIDINE (ZANAFLEX) 4 MG CAPSULE    Take 1 capsule by mouth 3 times daily as needed for Muscle spasms    TRAZODONE (DESYREL) 100 MG TABLET    Take 2 tablets by mouth nightly       Allergies     She is allergic to nut  [macadamia nut oil], peanut (diagnostic), penicillins, buspirone hcl, and hydrochlorothiazide.    Physical Exam     INITIAL VITALS: BP: (!) 84/58, Temp: 98.1 °F (36.7 °C), Pulse: 74, Respirations: 16, SpO2: 96 %   Physical Exam  Constitutional:       General: She is not in acute distress.     Appearance: She is not ill-appearing.   HENT:      Head: Normocephalic and atraumatic.   Cardiovascular:      Rate and Rhythm: Normal rate and regular rhythm.      Pulses: Normal pulses.      Heart sounds: Normal heart sounds. No murmur heard.  Pulmonary:      Effort: Pulmonary effort is normal. No respiratory distress.      Breath sounds: Normal breath sounds. No wheezing.   Abdominal:      Palpations: Abdomen is soft.      Tenderness: There is no abdominal tenderness. There is no guarding.   Musculoskeletal:      Right lower leg: No edema.      Left lower leg: No edema.   Neurological:      Mental Status: She is alert and oriented to person, place, and time.      Motor: No weakness.          Arpit Strickland MD  Resident  03/30/24 8854

## 2024-03-30 NOTE — ED PROVIDER NOTES
ED Attending Attestation Note     Date of evaluation: 3/30/2024    This patient was seen by the resident.  I have seen and examined the patient, agree with the workup, evaluation, management and diagnosis. The care plan has been discussed.  I have reviewed the ECG and concur with the resident's interpretation.  My assessment reveals 63-year-old female who presents with fatigue and concern for possible overdose.  Patient reports that she drank approximately 8 beers today.  She did not want to take her nighttime medicine and third up and was not sure if she had actually absorbed the medicine so she took it again.  She then felt fatigued and then came to the Emergency Department for further evaluation.  She was initially hypotensive here with pressures of 80s over 70s and then her systolic did decrease down to the 70s.  She does report to me that she took 6 tablets of her 4 mg of tizanidine for a total of 24 mg as well as 4 tablets over 100 mg of trazodone for total of 400 mg.  Her EKG here was unremarkable.  Her labs are unremarkable.  We did discuss the case with poison control who recommended observation period of 4 to 6 hours from time of ingestion.  Patient was given 2 L of fluids here in the ED and her blood pressure did improve into the 110s systolic.  She states that she feels much improved at this time.  She is not having any further nausea.  She is able to ambulate here in the ED without difficulty.  Her systolic blood pressure is now consistently in the 110s.  Given that she is through her observation period with improved blood pressure and is back to her mental baseline feel that she is stable for outpatient management.  I did encourage her to follow-up with primary care provider and return precautions were discussed with her at the bedside..    Critical Care:  Due to the immediate potential for life-threatening deterioration due to Tizanidine overdose, I spent 31 minutes providing critical care.  This

## 2024-03-31 LAB
EKG ATRIAL RATE: 65 BPM
EKG DIAGNOSIS: NORMAL
EKG P AXIS: 32 DEGREES
EKG P-R INTERVAL: 132 MS
EKG Q-T INTERVAL: 418 MS
EKG QRS DURATION: 66 MS
EKG QTC CALCULATION (BAZETT): 434 MS
EKG R AXIS: 25 DEGREES
EKG T AXIS: 32 DEGREES
EKG VENTRICULAR RATE: 65 BPM

## 2024-04-09 ENCOUNTER — HOSPITAL ENCOUNTER (EMERGENCY)
Age: 64
Discharge: HOME | End: 2024-04-09
Attending: EMERGENCY MEDICINE
Payer: MEDICARE

## 2024-04-09 VITALS
BODY MASS INDEX: 29.01 KG/M2 | TEMPERATURE: 98.4 F | SYSTOLIC BLOOD PRESSURE: 128 MMHG | HEART RATE: 79 BPM | DIASTOLIC BLOOD PRESSURE: 81 MMHG | OXYGEN SATURATION: 99 % | HEIGHT: 64 IN | RESPIRATION RATE: 22 BRPM

## 2024-04-09 DIAGNOSIS — R11.2 NAUSEA VOMITING AND DIARRHEA: Primary | ICD-10-CM

## 2024-04-09 DIAGNOSIS — R19.7 NAUSEA VOMITING AND DIARRHEA: Primary | ICD-10-CM

## 2024-04-09 LAB
ABO + RH BLD: NORMAL
ALBUMIN SERPL-MCNC: 4.5 G/DL (ref 3.4–5)
ALP SERPL-CCNC: 85 U/L (ref 40–129)
ALT SERPL-CCNC: 12 U/L (ref 10–40)
ANION GAP SERPL CALCULATED.3IONS-SCNC: 14 MMOL/L (ref 3–16)
AST SERPL-CCNC: 22 U/L (ref 15–37)
BASOPHILS # BLD: 0.1 K/UL (ref 0–0.2)
BASOPHILS NFR BLD: 0.8 %
BILIRUB DIRECT SERPL-MCNC: <0.2 MG/DL (ref 0–0.3)
BILIRUB INDIRECT SERPL-MCNC: NORMAL MG/DL (ref 0–1)
BILIRUB SERPL-MCNC: <0.2 MG/DL (ref 0–1)
BLD GP AB SCN SERPL QL: NORMAL
BUN SERPL-MCNC: 26 MG/DL (ref 7–20)
CALCIUM SERPL-MCNC: 9.3 MG/DL (ref 8.3–10.6)
CHLORIDE SERPL-SCNC: 103 MMOL/L (ref 99–110)
CO2 SERPL-SCNC: 22 MMOL/L (ref 21–32)
CREAT SERPL-MCNC: 1.4 MG/DL (ref 0.6–1.2)
DEPRECATED RDW RBC AUTO: 14.5 % (ref 12.4–15.4)
EOSINOPHIL # BLD: 0.1 K/UL (ref 0–0.6)
EOSINOPHIL NFR BLD: 1.6 %
GFR SERPLBLD CREATININE-BSD FMLA CKD-EPI: 42 ML/MIN/{1.73_M2}
GLUCOSE SERPL-MCNC: 99 MG/DL (ref 70–99)
HCT VFR BLD AUTO: 38.1 % (ref 36–48)
HGB BLD-MCNC: 12.3 G/DL (ref 12–16)
INR PPP: 0.95 (ref 0.85–1.15)
LYMPHOCYTES # BLD: 1.2 K/UL (ref 1–5.1)
LYMPHOCYTES NFR BLD: 15.5 %
MCH RBC QN AUTO: 28.2 PG (ref 26–34)
MCHC RBC AUTO-ENTMCNC: 32.3 G/DL (ref 31–36)
MCV RBC AUTO: 87.3 FL (ref 80–100)
MONOCYTES # BLD: 0.6 K/UL (ref 0–1.3)
MONOCYTES NFR BLD: 7.3 %
NEUTROPHILS # BLD: 6 K/UL (ref 1.7–7.7)
NEUTROPHILS NFR BLD: 74.8 %
PLATELET # BLD AUTO: 269 K/UL (ref 135–450)
PMV BLD AUTO: 7.5 FL (ref 5–10.5)
POTASSIUM SERPL-SCNC: 4.2 MMOL/L (ref 3.5–5.1)
PROT SERPL-MCNC: 7.7 G/DL (ref 6.4–8.2)
PROTHROMBIN TIME: 12.9 SEC (ref 11.9–14.9)
RBC # BLD AUTO: 4.37 M/UL (ref 4–5.2)
SODIUM SERPL-SCNC: 139 MMOL/L (ref 136–145)
WBC # BLD AUTO: 8 K/UL (ref 4–11)

## 2024-04-09 PROCEDURE — C9113 INJ PANTOPRAZOLE SODIUM, VIA: HCPCS | Performed by: EMERGENCY MEDICINE

## 2024-04-09 PROCEDURE — 96375 TX/PRO/DX INJ NEW DRUG ADDON: CPT

## 2024-04-09 PROCEDURE — 86900 BLOOD TYPING SEROLOGIC ABO: CPT

## 2024-04-09 PROCEDURE — 80076 HEPATIC FUNCTION PANEL: CPT

## 2024-04-09 PROCEDURE — 86901 BLOOD TYPING SEROLOGIC RH(D): CPT

## 2024-04-09 PROCEDURE — 96374 THER/PROPH/DIAG INJ IV PUSH: CPT

## 2024-04-09 PROCEDURE — 85025 COMPLETE CBC W/AUTO DIFF WBC: CPT

## 2024-04-09 PROCEDURE — 86850 RBC ANTIBODY SCREEN: CPT

## 2024-04-09 PROCEDURE — 85610 PROTHROMBIN TIME: CPT

## 2024-04-09 PROCEDURE — 99284 EMERGENCY DEPT VISIT MOD MDM: CPT

## 2024-04-09 PROCEDURE — 80048 BASIC METABOLIC PNL TOTAL CA: CPT

## 2024-04-09 PROCEDURE — 6360000002 HC RX W HCPCS: Performed by: EMERGENCY MEDICINE

## 2024-04-09 RX ORDER — ONDANSETRON 2 MG/ML
4 INJECTION INTRAMUSCULAR; INTRAVENOUS ONCE
Status: COMPLETED | OUTPATIENT
Start: 2024-04-09 | End: 2024-04-09

## 2024-04-09 RX ORDER — PANTOPRAZOLE SODIUM 40 MG/10ML
40 INJECTION, POWDER, LYOPHILIZED, FOR SOLUTION INTRAVENOUS ONCE
Status: COMPLETED | OUTPATIENT
Start: 2024-04-09 | End: 2024-04-09

## 2024-04-09 RX ADMIN — PANTOPRAZOLE SODIUM 40 MG: 40 INJECTION, POWDER, FOR SOLUTION INTRAVENOUS at 13:36

## 2024-04-09 RX ADMIN — ONDANSETRON 4 MG: 2 INJECTION INTRAMUSCULAR; INTRAVENOUS at 13:35

## 2024-04-09 ASSESSMENT — ENCOUNTER SYMPTOMS
VOMITING: 1
COUGH: 0
DIARRHEA: 1
CONSTIPATION: 0
ABDOMINAL PAIN: 0
NAUSEA: 1
SHORTNESS OF BREATH: 0
RHINORRHEA: 0

## 2024-04-09 ASSESSMENT — PAIN - FUNCTIONAL ASSESSMENT: PAIN_FUNCTIONAL_ASSESSMENT: 0-10

## 2024-04-09 ASSESSMENT — PAIN SCALES - GENERAL: PAINLEVEL_OUTOF10: 0

## 2024-04-09 NOTE — ED TRIAGE NOTES
[facility] Emergency Department  MEDICAL SCREENING EXAM    Date of Service: 2024    Reason for Visit: Emesis (Patient comes to the ED today for emesis with black, tarry diarrhea. Patient does have a hx of 9 ulcers in her ABD. ) and Diarrhea        Patient History, Brief Exam, and Initial Assessment     Abbreviated HPI: Cait Mendoza is a 63 y.o. female presenting with vomiting as well as dark black loose stools.  States she has a long history of ulcers but has never had GI bleeding previously.  She notes some diffuse abdominal discomfort.  Has prior  sections x 2 but no other abdominal surgeries.  Patient takes aspirin but no other blood thinners.    INITIAL VITALS: BP: 112/71, Temp: 98.4 °F (36.9 °C), Pulse: (!) 111, Respirations: 22, SpO2: 95 %    Alert female, in no apparent distress.  Borderline tachycardic.  Abdomen soft with mild diffuse tenderness, no rebound or guarding    Plan     Patient was evaluated in the REU for a medical screening exam.  To further evaluate the presenting complaints, the following orders have been placed:  Orders Placed This Encounter   Procedures    CBC with Auto Differential    BMP w/ Reflex to MG    Protime-INR    Hepatic Function Panel    Diet NPO    TYPE AND SCREEN        See primary provider's note for full details and final disposition.     Current Facility-Administered Medications:   Orders Placed This Encounter   Medications    pantoprazole (PROTONIX) injection 40 mg    ondansetron (ZOFRAN) injection 4 mg         REU Dispo     Stable for lobby while awaiting ED bed    Relevant Medical History     Past Medical History:   Diagnosis Date    Anxiety     Arthritis     Hepatitis C     Hyperlipidemia     Hypertension     Reflux esophagitis      Past Surgical History:   Procedure Laterality Date    BLADDER SURGERY Bilateral 2022    CYSTOSCOPY RETROGRADE PYELOGRAM STENT INSERTION BILATERAL performed by Nikita Otto MD at [facility] OR    CAROTID ENDARTERECTOMY

## 2024-04-09 NOTE — DISCHARGE INSTRUCTIONS
You were seen in the emergency department for nausea, vomiting, and dark-colored stool.  We performed multiple tests in the emergency department that overall looked reassuring.  You should follow-up with your GI doctor.  You should call them before your colonoscopy as they may wish to add onto the endoscopy as well.  Take your medications as prescribed and return with any new or worsening symptoms

## 2024-04-09 NOTE — ED NOTES
Pt condition stable at discharge, vital signs stable as well. Pt verbalizes understanding of discharge instructions. Pt discharged with copy of AVS.      Ld Burnett RN  04/09/24 9986

## 2024-04-09 NOTE — ED PROVIDER NOTES
ED Attending Attestation Note     Date of evaluation: 4/9/2024    This patient was seen by the resident.  I have seen and examined the patient, agree with the workup, evaluation, management and diagnosis. The care plan has been discussed.  My assessment reveals 63-year-old female who presents with nausea vomiting diarrhea that started earlier this morning but has now much improved.  She appears well.  Soft abdomen.     Rell Bradley MD  04/09/24 [id number]

## 2024-04-09 NOTE — ED PROVIDER NOTES
THE [facility]  EMERGENCY DEPARTMENT ENCOUNTER          EM RESIDENT NOTE       Date of evaluation: 4/9/2024    Chief Complaint     Emesis (Patient comes to the ED today for emesis with black, tarry diarrhea. Patient does have a hx of 9 ulcers in her ABD. ) and Diarrhea      History of Present Illness     Cait Mendoza is a 63 y.o. female with a history pf GERD, gastric ulcers, HTN who presents nausea, vomiting, and dark colored stools.  Patient reports that she was in her usual state of health until this morning at which time she developed nausea with frequent episodes of nonbloody nonbilious vomiting.  She also reports several episodes of diarrhea today.  She describes the stool is dark in color.  Denies any bright red blood.  Mild associated abdominal cramping.  Patient states that all of her symptoms have since resolved and she feels much improved.  She denies any complaints at this time.  No fevers or chills.  No lightheadedness or dizziness.  No chest pain or shortness of breath.  Patient does report a history of known gastric ulcers, states that she takes her Protonix and Carafate as prescribed.  Denies any increased NSAID use.  Patient reportedly scheduled to undergo colonoscopy in 2 weeks.    MEDICAL DECISION MAKING / ASSESSMENT / PLAN     INITIAL VITALS: BP: 112/71, Temp: 98.4 °F (36.9 °C), Pulse: (!) 111, Respirations: 22, SpO2: 95 %    Cait Mendoza is a 63 y.o. female who presents the emergency department for evaluation of nausea, vomiting, and diarrhea with dark-colored stool.  Patient appears in no acute distress and is resting comfortably.  Vital signs at the time of presentation notable for tachycardia, however by the time that the patient is roomed 5 hours later this has resolved.  No intervention was provided.  Remainder of vital signs within normal limits and physical exam overall reassuring.  Extensive workup was obtained in triage.  CBC without significant leukocytosis or anemia.

## 2024-06-21 ENCOUNTER — APPOINTMENT (OUTPATIENT)
Dept: GENERAL RADIOLOGY | Age: 64
End: 2024-06-21
Payer: MEDICARE

## 2024-06-21 ENCOUNTER — HOSPITAL ENCOUNTER (EMERGENCY)
Age: 64
Discharge: HOME | End: 2024-06-21
Attending: EMERGENCY MEDICINE
Payer: MEDICARE

## 2024-06-21 VITALS
HEART RATE: 80 BPM | SYSTOLIC BLOOD PRESSURE: 113 MMHG | TEMPERATURE: 97.5 F | HEIGHT: 65 IN | DIASTOLIC BLOOD PRESSURE: 73 MMHG | RESPIRATION RATE: 23 BRPM | OXYGEN SATURATION: 96 % | BODY MASS INDEX: 28.92 KG/M2 | WEIGHT: 173.6 LBS
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
## 2024-06-21 DIAGNOSIS — S20.211A CONTUSION OF RIGHT CHEST WALL, INITIAL ENCOUNTER: ICD-10-CM

## 2024-06-21 LAB
EKG ATRIAL RATE: 74 BPM
EKG DIAGNOSIS: NORMAL
EKG P AXIS: 43 DEGREES
EKG P-R INTERVAL: 130 MS
EKG Q-T INTERVAL: 410 MS
EKG QRS DURATION: 72 MS
EKG QTC CALCULATION (BAZETT): 455 MS
EKG R AXIS: 18 DEGREES
EKG T AXIS: 22 DEGREES
EKG VENTRICULAR RATE: 74 BPM

## 2024-06-21 PROCEDURE — 6370000000 HC RX 637 (ALT 250 FOR IP): Performed by: PHYSICIAN ASSISTANT

## 2024-06-21 PROCEDURE — 71101 X-RAY EXAM UNILAT RIBS/CHEST: CPT

## 2024-06-21 PROCEDURE — 93005 ELECTROCARDIOGRAM TRACING: CPT | Performed by: EMERGENCY MEDICINE

## 2024-06-21 PROCEDURE — 99284 EMERGENCY DEPT VISIT MOD MDM: CPT

## 2024-06-21 RX ORDER — LIDOCAINE 4 G/G
1 PATCH TOPICAL ONCE
Status: DISCONTINUED | OUTPATIENT
Start: 2024-06-21 | End: 2024-06-21 | Stop reason: HOSPADM

## 2024-06-21 RX ORDER — IBUPROFEN 400 MG/1
800 TABLET ORAL ONCE
Status: COMPLETED | OUTPATIENT
Start: 2024-06-21 | End: 2024-06-21

## 2024-06-21 RX ORDER — ACETAMINOPHEN 325 MG/1
650 TABLET ORAL ONCE
Status: COMPLETED | OUTPATIENT
Start: 2024-06-21 | End: 2024-06-21

## 2024-06-21 RX ADMIN — IBUPROFEN 800 MG: 400 TABLET, FILM COATED ORAL at 14:52

## 2024-06-21 RX ADMIN — ACETAMINOPHEN 650 MG: 325 TABLET ORAL at 13:50

## 2024-06-21 ASSESSMENT — PAIN SCALES - GENERAL
PAINLEVEL_OUTOF10: 10
PAINLEVEL_OUTOF10: 10

## 2024-06-21 ASSESSMENT — ENCOUNTER SYMPTOMS
ABDOMINAL PAIN: 0
SHORTNESS OF BREATH: 0
BACK PAIN: 0
EYE REDNESS: 0
NAUSEA: 0
VOMITING: 0

## 2024-06-21 ASSESSMENT — LIFESTYLE VARIABLES
HOW MANY STANDARD DRINKS CONTAINING ALCOHOL DO YOU HAVE ON A TYPICAL DAY: 1 OR 2
HOW OFTEN DO YOU HAVE A DRINK CONTAINING ALCOHOL: 2-4 TIMES A MONTH

## 2024-06-21 ASSESSMENT — PAIN DESCRIPTION - LOCATION
LOCATION: HEAD;CHEST
LOCATION: CHEST

## 2024-06-21 ASSESSMENT — PAIN DESCRIPTION - DESCRIPTORS: DESCRIPTORS: DISCOMFORT

## 2024-06-21 NOTE — ED PROVIDER NOTES
General: She is not in acute distress.  HENT:      Head: Normocephalic and atraumatic.      Nose: Nasal tenderness present. No nasal deformity or septal deviation.      Right Nostril: No septal hematoma.      Left Nostril: No septal hematoma.   Eyes:      Extraocular Movements: Extraocular movements intact.      Conjunctiva/sclera: Conjunctivae normal.   Cardiovascular:      Rate and Rhythm: Normal rate and regular rhythm.   Pulmonary:      Effort: Pulmonary effort is normal. No respiratory distress.      Breath sounds: Normal breath sounds. No wheezing, rhonchi or rales.   Chest:      Chest wall: Tenderness (right anterior and lateral chest wall) present.   Abdominal:      General: Bowel sounds are normal. There is no distension.      Palpations: Abdomen is soft.      Tenderness: There is no abdominal tenderness. There is no guarding or rebound.   Musculoskeletal:         General: No deformity.      Cervical back: Neck supple.      Comments: No midline spinal tenderness or step-off deformity.  Pelvis is stable non-tender.   Skin:     General: Skin is warm and dry.   Neurological:      General: No focal deficit present.      Mental Status: She is alert and oriented to person, place, and time.      Cranial Nerves: No cranial nerve deficit or dysarthria.      Sensory: No sensory deficit.      Motor: No weakness.      Coordination: Coordination normal.   Psychiatric:         Mood and Affect: Mood normal.         Behavior: Behavior normal.             Kemi Pina PA-C  06/21/24 2152

## 2024-06-21 NOTE — DISCHARGE INSTRUCTIONS
You may take Tylenol or ibuprofen as needed for pain.  Remove your lidocaine patch in 12 hours.  Leave off for 12 hours.  Then you may apply another.  Regarding your nasal trauma, avoid nose blowing, sneeze with mouth open, sleep with head of bed at 90 degrees. Ice affected areas.    Follow-up with primary care physician as needed.    Return to emergency department new or worsening symptoms.

## 2024-09-25 ENCOUNTER — APPOINTMENT (OUTPATIENT)
Dept: GENERAL RADIOLOGY | Age: 64
End: 2024-09-25
Payer: MEDICARE

## 2024-09-25 ENCOUNTER — APPOINTMENT (OUTPATIENT)
Dept: CT IMAGING | Age: 64
End: 2024-09-25
Payer: MEDICARE

## 2024-09-25 ENCOUNTER — HOSPITAL ENCOUNTER (OUTPATIENT)
Age: 64
Setting detail: OBSERVATION
Discharge: HOME | End: 2024-09-26
Attending: EMERGENCY MEDICINE | Admitting: INTERNAL MEDICINE
Payer: MEDICARE

## 2024-09-25 DIAGNOSIS — R41.82 ALTERED MENTAL STATUS, UNSPECIFIED ALTERED MENTAL STATUS TYPE: Primary | ICD-10-CM

## 2024-09-25 LAB
ALBUMIN SERPL-MCNC: 3 G/DL (ref 3.4–5)
ALBUMIN SERPL-MCNC: 3.7 G/DL (ref 3.4–5)
ALBUMIN/GLOB SERPL: 1.4 {RATIO} (ref 1.1–2.2)
ALP SERPL-CCNC: 42 U/L (ref 40–129)
ALP SERPL-CCNC: 60 U/L (ref 40–129)
ALT SERPL-CCNC: 10 U/L (ref 10–40)
ALT SERPL-CCNC: 12 U/L (ref 10–40)
AMMONIA PLAS-SCNC: <10 UMOL/L (ref 11–51)
AMORPH SED URNS QL MICRO: ABNORMAL /HPF
AMPHETAMINES UR QL SCN>1000 NG/ML: NORMAL
ANION GAP SERPL CALCULATED.3IONS-SCNC: 8 MMOL/L (ref 3–16)
AST SERPL-CCNC: 22 U/L (ref 15–37)
AST SERPL-CCNC: 37 U/L (ref 15–37)
BACTERIA URNS QL MICRO: ABNORMAL /HPF
BARBITURATES UR QL SCN>200 NG/ML: NORMAL
BASOPHILS # BLD: 0 K/UL (ref 0–0.2)
BASOPHILS NFR BLD: 0.9 %
BENZODIAZ UR QL SCN>200 NG/ML: NORMAL
BILIRUB DIRECT SERPL-MCNC: 0.2 MG/DL (ref 0–0.3)
BILIRUB INDIRECT SERPL-MCNC: ABNORMAL MG/DL (ref 0–1)
BILIRUB SERPL-MCNC: <0.2 MG/DL (ref 0–1)
BILIRUB SERPL-MCNC: <0.2 MG/DL (ref 0–1)
BILIRUB UR QL STRIP.AUTO: NEGATIVE
BUN SERPL-MCNC: 9 MG/DL (ref 7–20)
CALCIUM SERPL-MCNC: 8.6 MG/DL (ref 8.3–10.6)
CANNABINOIDS UR QL SCN>50 NG/ML: NORMAL
CHLORIDE SERPL-SCNC: 103 MMOL/L (ref 99–110)
CLARITY UR: CLEAR
CO2 SERPL-SCNC: 26 MMOL/L (ref 21–32)
COCAINE UR QL SCN: NORMAL
COLOR UR: YELLOW
CREAT SERPL-MCNC: 1 MG/DL (ref 0.6–1.2)
DEPRECATED RDW RBC AUTO: 13.8 % (ref 12.4–15.4)
DRUG SCREEN COMMENT UR-IMP: NORMAL
EKG ATRIAL RATE: 78 BPM
EKG DIAGNOSIS: NORMAL
EKG P AXIS: 29 DEGREES
EKG P-R INTERVAL: 140 MS
EKG Q-T INTERVAL: 406 MS
EKG QRS DURATION: 74 MS
EKG QTC CALCULATION (BAZETT): 462 MS
EKG R AXIS: 6 DEGREES
EKG T AXIS: 53 DEGREES
EKG VENTRICULAR RATE: 78 BPM
EOSINOPHIL # BLD: 0.1 K/UL (ref 0–0.6)
EOSINOPHIL NFR BLD: 2.2 %
EPI CELLS #/AREA URNS HPF: ABNORMAL /HPF (ref 0–5)
FENTANYL SCREEN, URINE: NORMAL
FLUAV RNA RESP QL NAA+PROBE: NOT DETECTED
FLUBV RNA RESP QL NAA+PROBE: NOT DETECTED
GFR SERPLBLD CREATININE-BSD FMLA CKD-EPI: 62 ML/MIN/{1.73_M2}
GLUCOSE BLD-MCNC: 116 MG/DL (ref 70–99)
GLUCOSE SERPL-MCNC: 93 MG/DL (ref 70–99)
GLUCOSE UR STRIP.AUTO-MCNC: NEGATIVE MG/DL
HCT VFR BLD AUTO: 38.1 % (ref 36–48)
HGB BLD-MCNC: 12.3 G/DL (ref 12–16)
HGB UR QL STRIP.AUTO: NEGATIVE
HYALINE CASTS #/AREA URNS LPF: ABNORMAL /LPF (ref 0–2)
KETONES UR STRIP.AUTO-MCNC: NEGATIVE MG/DL
LACTATE BLDV-SCNC: 1 MMOL/L (ref 0.4–2)
LACTATE BLDV-SCNC: 1.1 MMOL/L (ref 0.4–2)
LEUKOCYTE ESTERASE UR QL STRIP.AUTO: NEGATIVE
LIPASE SERPL-CCNC: 34 U/L (ref 13–60)
LYMPHOCYTES # BLD: 1.2 K/UL (ref 1–5.1)
LYMPHOCYTES NFR BLD: 22.4 %
MCH RBC QN AUTO: 28.6 PG (ref 26–34)
MCHC RBC AUTO-ENTMCNC: 32.4 G/DL (ref 31–36)
MCV RBC AUTO: 88.5 FL (ref 80–100)
METHADONE UR QL SCN>300 NG/ML: NORMAL
MONOCYTES # BLD: 0.4 K/UL (ref 0–1.3)
MONOCYTES NFR BLD: 6.5 %
MUCOUS THREADS #/AREA URNS LPF: ABNORMAL /LPF
NEUTROPHILS # BLD: 3.7 K/UL (ref 1.7–7.7)
NEUTROPHILS NFR BLD: 68 %
NITRITE UR QL STRIP.AUTO: NEGATIVE
OPIATES UR QL SCN>300 NG/ML: NORMAL
OXYCODONE UR QL SCN: NORMAL
PCP UR QL SCN>25 NG/ML: NORMAL
PERFORMED ON: ABNORMAL
PH UR STRIP.AUTO: 6.5 [PH] (ref 5–8)
PH UR STRIP: 5 [PH]
PLATELET # BLD AUTO: 213 K/UL (ref 135–450)
PMV BLD AUTO: 7.6 FL (ref 5–10.5)
POTASSIUM SERPL-SCNC: 3.8 MMOL/L (ref 3.5–5.1)
PROT SERPL-MCNC: 5.3 G/DL (ref 6.4–8.2)
PROT SERPL-MCNC: 6.3 G/DL (ref 6.4–8.2)
PROT UR STRIP.AUTO-MCNC: ABNORMAL MG/DL
RBC # BLD AUTO: 4.3 M/UL (ref 4–5.2)
RBC #/AREA URNS HPF: ABNORMAL /HPF (ref 0–4)
SARS-COV-2 RNA RESP QL NAA+PROBE: NOT DETECTED
SODIUM SERPL-SCNC: 137 MMOL/L (ref 136–145)
SP GR UR STRIP.AUTO: 1.02 (ref 1–1.03)
TROPONIN, HIGH SENSITIVITY: 7 NG/L (ref 0–14)
TROPONIN, HIGH SENSITIVITY: <6 NG/L (ref 0–14)
TSH SERPL DL<=0.005 MIU/L-ACNC: 0.74 UIU/ML (ref 0.27–4.2)
UA DIPSTICK W REFLEX MICRO PNL UR: YES
URN SPEC COLLECT METH UR: ABNORMAL
UROBILINOGEN UR STRIP-ACNC: 0.2 E.U./DL
WBC # BLD AUTO: 5.5 K/UL (ref 4–11)
WBC #/AREA URNS HPF: ABNORMAL /HPF (ref 0–5)

## 2024-09-25 PROCEDURE — 82140 ASSAY OF AMMONIA: CPT

## 2024-09-25 PROCEDURE — 96360 HYDRATION IV INFUSION INIT: CPT

## 2024-09-25 PROCEDURE — 99285 EMERGENCY DEPT VISIT HI MDM: CPT

## 2024-09-25 PROCEDURE — 2580000003 HC RX 258: Performed by: INTERNAL MEDICINE

## 2024-09-25 PROCEDURE — 93005 ELECTROCARDIOGRAM TRACING: CPT

## 2024-09-25 PROCEDURE — 80307 DRUG TEST PRSMV CHEM ANLYZR: CPT

## 2024-09-25 PROCEDURE — 71045 X-RAY EXAM CHEST 1 VIEW: CPT

## 2024-09-25 PROCEDURE — 85025 COMPLETE CBC W/AUTO DIFF WBC: CPT

## 2024-09-25 PROCEDURE — 96372 THER/PROPH/DIAG INJ SC/IM: CPT

## 2024-09-25 PROCEDURE — 6370000000 HC RX 637 (ALT 250 FOR IP): Performed by: INTERNAL MEDICINE

## 2024-09-25 PROCEDURE — 83605 ASSAY OF LACTIC ACID: CPT

## 2024-09-25 PROCEDURE — 84484 ASSAY OF TROPONIN QUANT: CPT

## 2024-09-25 PROCEDURE — 87636 SARSCOV2 & INF A&B AMP PRB: CPT

## 2024-09-25 PROCEDURE — 81001 URINALYSIS AUTO W/SCOPE: CPT

## 2024-09-25 PROCEDURE — G0378 HOSPITAL OBSERVATION PER HR: HCPCS

## 2024-09-25 PROCEDURE — 36415 COLL VENOUS BLD VENIPUNCTURE: CPT

## 2024-09-25 PROCEDURE — 93005 ELECTROCARDIOGRAM TRACING: CPT | Performed by: INTERNAL MEDICINE

## 2024-09-25 PROCEDURE — 84443 ASSAY THYROID STIM HORMONE: CPT

## 2024-09-25 PROCEDURE — 6360000002 HC RX W HCPCS: Performed by: INTERNAL MEDICINE

## 2024-09-25 PROCEDURE — 2580000003 HC RX 258: Performed by: EMERGENCY MEDICINE

## 2024-09-25 PROCEDURE — 70450 CT HEAD/BRAIN W/O DYE: CPT

## 2024-09-25 PROCEDURE — 83690 ASSAY OF LIPASE: CPT

## 2024-09-25 PROCEDURE — 80053 COMPREHEN METABOLIC PANEL: CPT

## 2024-09-25 PROCEDURE — 96361 HYDRATE IV INFUSION ADD-ON: CPT

## 2024-09-25 RX ORDER — ATORVASTATIN CALCIUM 40 MG/1
40 TABLET, FILM COATED ORAL NIGHTLY
Status: DISCONTINUED | OUTPATIENT
Start: 2024-09-25 | End: 2024-09-26 | Stop reason: HOSPADM

## 2024-09-25 RX ORDER — POTASSIUM CHLORIDE 1500 MG/1
40 TABLET, EXTENDED RELEASE ORAL PRN
Status: DISCONTINUED | OUTPATIENT
Start: 2024-09-25 | End: 2024-09-26 | Stop reason: HOSPADM

## 2024-09-25 RX ORDER — SODIUM CHLORIDE 0.9 % (FLUSH) 0.9 %
5-40 SYRINGE (ML) INJECTION PRN
Status: DISCONTINUED | OUTPATIENT
Start: 2024-09-25 | End: 2024-09-26 | Stop reason: HOSPADM

## 2024-09-25 RX ORDER — HYDROXYZINE HYDROCHLORIDE 25 MG/1
25 TABLET, FILM COATED ORAL EVERY 8 HOURS PRN
Status: DISCONTINUED | OUTPATIENT
Start: 2024-09-25 | End: 2024-09-26 | Stop reason: HOSPADM

## 2024-09-25 RX ORDER — SODIUM CHLORIDE 9 MG/ML
INJECTION, SOLUTION INTRAVENOUS CONTINUOUS
Status: ACTIVE | OUTPATIENT
Start: 2024-09-25 | End: 2024-09-26

## 2024-09-25 RX ORDER — ENOXAPARIN SODIUM 100 MG/ML
40 INJECTION SUBCUTANEOUS DAILY
Status: DISCONTINUED | OUTPATIENT
Start: 2024-09-25 | End: 2024-09-26 | Stop reason: HOSPADM

## 2024-09-25 RX ORDER — 0.9 % SODIUM CHLORIDE 0.9 %
1000 INTRAVENOUS SOLUTION INTRAVENOUS ONCE
Status: COMPLETED | OUTPATIENT
Start: 2024-09-25 | End: 2024-09-25

## 2024-09-25 RX ORDER — NICOTINE 21 MG/24HR
1 PATCH, TRANSDERMAL 24 HOURS TRANSDERMAL DAILY
Status: DISCONTINUED | OUTPATIENT
Start: 2024-09-25 | End: 2024-09-26 | Stop reason: HOSPADM

## 2024-09-25 RX ORDER — CHOLESTYRAMINE LIGHT 4 G/5.7G
4 POWDER, FOR SUSPENSION ORAL DAILY
Status: DISCONTINUED | OUTPATIENT
Start: 2024-09-26 | End: 2024-09-26 | Stop reason: HOSPADM

## 2024-09-25 RX ORDER — POLYETHYLENE GLYCOL 3350 17 G/17G
17 POWDER, FOR SOLUTION ORAL DAILY PRN
Status: DISCONTINUED | OUTPATIENT
Start: 2024-09-25 | End: 2024-09-26 | Stop reason: HOSPADM

## 2024-09-25 RX ORDER — METOPROLOL SUCCINATE 25 MG/1
25 TABLET, EXTENDED RELEASE ORAL DAILY
Status: DISCONTINUED | OUTPATIENT
Start: 2024-09-26 | End: 2024-09-25

## 2024-09-25 RX ORDER — ATOMOXETINE 60 MG/1
60 CAPSULE ORAL DAILY
Status: DISCONTINUED | OUTPATIENT
Start: 2024-09-26 | End: 2024-09-26 | Stop reason: HOSPADM

## 2024-09-25 RX ORDER — ONDANSETRON 4 MG/1
4 TABLET, ORALLY DISINTEGRATING ORAL EVERY 8 HOURS PRN
Status: DISCONTINUED | OUTPATIENT
Start: 2024-09-25 | End: 2024-09-26 | Stop reason: HOSPADM

## 2024-09-25 RX ORDER — POTASSIUM CHLORIDE 7.45 MG/ML
10 INJECTION INTRAVENOUS PRN
Status: DISCONTINUED | OUTPATIENT
Start: 2024-09-25 | End: 2024-09-26 | Stop reason: HOSPADM

## 2024-09-25 RX ORDER — SODIUM CHLORIDE 9 MG/ML
INJECTION, SOLUTION INTRAVENOUS PRN
Status: DISCONTINUED | OUTPATIENT
Start: 2024-09-25 | End: 2024-09-26 | Stop reason: HOSPADM

## 2024-09-25 RX ORDER — ACETAMINOPHEN 325 MG/1
650 TABLET ORAL
Status: COMPLETED | OUTPATIENT
Start: 2024-09-25 | End: 2024-09-26

## 2024-09-25 RX ORDER — TROSPIUM CHLORIDE 20 MG/1
20 TABLET, FILM COATED ORAL
Status: DISCONTINUED | OUTPATIENT
Start: 2024-09-26 | End: 2024-09-26 | Stop reason: HOSPADM

## 2024-09-25 RX ORDER — AMLODIPINE BESYLATE 5 MG/1
5 TABLET ORAL DAILY
Status: DISCONTINUED | OUTPATIENT
Start: 2024-09-26 | End: 2024-09-26 | Stop reason: HOSPADM

## 2024-09-25 RX ORDER — SODIUM CHLORIDE 0.9 % (FLUSH) 0.9 %
5-40 SYRINGE (ML) INJECTION EVERY 12 HOURS SCHEDULED
Status: DISCONTINUED | OUTPATIENT
Start: 2024-09-25 | End: 2024-09-26 | Stop reason: HOSPADM

## 2024-09-25 RX ORDER — SULFASALAZINE 500 MG/1
500 TABLET ORAL 2 TIMES DAILY
Status: DISCONTINUED | OUTPATIENT
Start: 2024-09-25 | End: 2024-09-26 | Stop reason: HOSPADM

## 2024-09-25 RX ORDER — ONDANSETRON 2 MG/ML
4 INJECTION INTRAMUSCULAR; INTRAVENOUS EVERY 6 HOURS PRN
Status: DISCONTINUED | OUTPATIENT
Start: 2024-09-25 | End: 2024-09-26 | Stop reason: HOSPADM

## 2024-09-25 RX ORDER — HYDROXYZINE HYDROCHLORIDE 25 MG/1
25 TABLET, FILM COATED ORAL EVERY 8 HOURS PRN
COMMUNITY
Start: 2024-01-10

## 2024-09-25 RX ORDER — SUCRALFATE ORAL 1 G/10ML
1 SUSPENSION ORAL
Status: DISCONTINUED | OUTPATIENT
Start: 2024-09-25 | End: 2024-09-26 | Stop reason: HOSPADM

## 2024-09-25 RX ORDER — MESALAMINE 400 MG/1
800 CAPSULE, DELAYED RELEASE ORAL 3 TIMES DAILY
Status: DISCONTINUED | OUTPATIENT
Start: 2024-09-25 | End: 2024-09-25 | Stop reason: ALTCHOICE

## 2024-09-25 RX ORDER — MAGNESIUM SULFATE IN WATER 40 MG/ML
2000 INJECTION, SOLUTION INTRAVENOUS PRN
Status: DISCONTINUED | OUTPATIENT
Start: 2024-09-25 | End: 2024-09-26 | Stop reason: HOSPADM

## 2024-09-25 RX ORDER — SULFASALAZINE 500 MG/1
500 TABLET ORAL 2 TIMES DAILY
COMMUNITY
Start: 2024-06-03

## 2024-09-25 RX ORDER — PANTOPRAZOLE SODIUM 40 MG/1
40 TABLET, DELAYED RELEASE ORAL 2 TIMES DAILY
Status: DISCONTINUED | OUTPATIENT
Start: 2024-09-25 | End: 2024-09-26 | Stop reason: HOSPADM

## 2024-09-25 RX ORDER — TRAZODONE HYDROCHLORIDE 100 MG/1
200 TABLET ORAL NIGHTLY
Status: DISCONTINUED | OUTPATIENT
Start: 2024-09-25 | End: 2024-09-26 | Stop reason: HOSPADM

## 2024-09-25 RX ORDER — ESCITALOPRAM OXALATE 10 MG/1
10 TABLET ORAL DAILY
Status: DISCONTINUED | OUTPATIENT
Start: 2024-09-26 | End: 2024-09-26 | Stop reason: HOSPADM

## 2024-09-25 RX ADMIN — SUCRALFATE ORAL 1 G: 1 SUSPENSION ORAL at 22:13

## 2024-09-25 RX ADMIN — PANTOPRAZOLE SODIUM 40 MG: 40 TABLET, DELAYED RELEASE ORAL at 22:14

## 2024-09-25 RX ADMIN — HYDROXYZINE HYDROCHLORIDE 25 MG: 25 TABLET ORAL at 22:14

## 2024-09-25 RX ADMIN — ENOXAPARIN SODIUM 40 MG: 100 INJECTION SUBCUTANEOUS at 18:11

## 2024-09-25 RX ADMIN — SODIUM CHLORIDE: 9 INJECTION, SOLUTION INTRAVENOUS at 18:12

## 2024-09-25 RX ADMIN — TRAZODONE HYDROCHLORIDE 200 MG: 100 TABLET ORAL at 22:13

## 2024-09-25 RX ADMIN — SODIUM CHLORIDE 1000 ML: 9 INJECTION, SOLUTION INTRAVENOUS at 14:28

## 2024-09-25 RX ADMIN — ATORVASTATIN CALCIUM 40 MG: 40 TABLET, FILM COATED ORAL at 22:14

## 2024-09-25 ASSESSMENT — ENCOUNTER SYMPTOMS
ABDOMINAL PAIN: 0
CHEST TIGHTNESS: 0
ABDOMINAL DISTENTION: 0
SHORTNESS OF BREATH: 0

## 2024-09-25 ASSESSMENT — LIFESTYLE VARIABLES
HOW MANY STANDARD DRINKS CONTAINING ALCOHOL DO YOU HAVE ON A TYPICAL DAY: PATIENT DOES NOT DRINK
HOW OFTEN DO YOU HAVE A DRINK CONTAINING ALCOHOL: NEVER

## 2024-09-25 ASSESSMENT — PAIN SCALES - GENERAL: PAINLEVEL_OUTOF10: 0

## 2024-09-26 VITALS
SYSTOLIC BLOOD PRESSURE: 129 MMHG | HEART RATE: 85 BPM | WEIGHT: 163.4 LBS | BODY MASS INDEX: 27.9 KG/M2 | RESPIRATION RATE: 18 BRPM | OXYGEN SATURATION: 94 % | HEIGHT: 64 IN | DIASTOLIC BLOOD PRESSURE: 85 MMHG | TEMPERATURE: 98 F

## 2024-09-26 PROCEDURE — 96361 HYDRATE IV INFUSION ADD-ON: CPT

## 2024-09-26 PROCEDURE — G0378 HOSPITAL OBSERVATION PER HR: HCPCS

## 2024-09-26 PROCEDURE — 6360000002 HC RX W HCPCS: Performed by: INTERNAL MEDICINE

## 2024-09-26 PROCEDURE — 97116 GAIT TRAINING THERAPY: CPT

## 2024-09-26 PROCEDURE — 6370000000 HC RX 637 (ALT 250 FOR IP): Performed by: INTERNAL MEDICINE

## 2024-09-26 PROCEDURE — 6370000000 HC RX 637 (ALT 250 FOR IP): Performed by: NURSE PRACTITIONER

## 2024-09-26 PROCEDURE — 97161 PT EVAL LOW COMPLEX 20 MIN: CPT

## 2024-09-26 PROCEDURE — 96372 THER/PROPH/DIAG INJ SC/IM: CPT

## 2024-09-26 RX ADMIN — SUCRALFATE ORAL 1 G: 1 SUSPENSION ORAL at 06:19

## 2024-09-26 RX ADMIN — AMLODIPINE BESYLATE 5 MG: 5 TABLET ORAL at 10:10

## 2024-09-26 RX ADMIN — CHOLESTYRAMINE 4 G: 4 POWDER, FOR SUSPENSION ORAL at 10:07

## 2024-09-26 RX ADMIN — PANTOPRAZOLE SODIUM 40 MG: 40 TABLET, DELAYED RELEASE ORAL at 10:07

## 2024-09-26 RX ADMIN — TROSPIUM CHLORIDE 20 MG: 20 TABLET, FILM COATED ORAL at 17:20

## 2024-09-26 RX ADMIN — SUCRALFATE ORAL 1 G: 1 SUSPENSION ORAL at 10:07

## 2024-09-26 RX ADMIN — ESCITALOPRAM OXALATE 10 MG: 10 TABLET ORAL at 10:07

## 2024-09-26 RX ADMIN — ACETAMINOPHEN 650 MG: 325 TABLET ORAL at 00:16

## 2024-09-26 RX ADMIN — SUCRALFATE ORAL 1 G: 1 SUSPENSION ORAL at 17:20

## 2024-09-26 RX ADMIN — ENOXAPARIN SODIUM 40 MG: 100 INJECTION SUBCUTANEOUS at 10:07

## 2024-09-26 RX ADMIN — TROSPIUM CHLORIDE 20 MG: 20 TABLET, FILM COATED ORAL at 06:19

## 2024-09-26 RX ADMIN — ATOMOXETINE 60 MG: 60 CAPSULE ORAL at 10:13

## 2024-09-26 RX ADMIN — SULFASALAZINE 500 MG: 500 TABLET ORAL at 10:22

## 2024-09-26 RX ADMIN — HYDROXYZINE HYDROCHLORIDE 25 MG: 25 TABLET ORAL at 17:20

## 2024-09-26 ASSESSMENT — PAIN DESCRIPTION - DESCRIPTORS: DESCRIPTORS: ACHING;DISCOMFORT

## 2024-09-26 ASSESSMENT — PAIN DESCRIPTION - LOCATION: LOCATION: HEAD

## 2024-09-26 ASSESSMENT — PAIN DESCRIPTION - ORIENTATION: ORIENTATION: INNER

## 2024-09-26 ASSESSMENT — PAIN SCALES - GENERAL: PAINLEVEL_OUTOF10: 3

## 2024-09-27 LAB
EKG ATRIAL RATE: 55 BPM
EKG DIAGNOSIS: NORMAL
EKG P AXIS: 50 DEGREES
EKG P-R INTERVAL: 136 MS
EKG Q-T INTERVAL: 448 MS
EKG QRS DURATION: 76 MS
EKG QTC CALCULATION (BAZETT): 428 MS
EKG R AXIS: 12 DEGREES
EKG T AXIS: 29 DEGREES
EKG VENTRICULAR RATE: 55 BPM

## 2025-01-09 ENCOUNTER — TELEPHONE (OUTPATIENT)
Dept: FAMILY MEDICINE CLINIC | Age: 65
End: 2025-01-09

## 2025-01-09 ENCOUNTER — OFFICE VISIT (OUTPATIENT)
Dept: FAMILY MEDICINE CLINIC | Age: 65
End: 2025-01-09

## 2025-01-09 VITALS
HEIGHT: 64 IN | OXYGEN SATURATION: 95 % | HEART RATE: 74 BPM | WEIGHT: 143 LBS | DIASTOLIC BLOOD PRESSURE: 72 MMHG | SYSTOLIC BLOOD PRESSURE: 120 MMHG | BODY MASS INDEX: 24.41 KG/M2

## 2025-01-09 DIAGNOSIS — Z12.31 ENCOUNTER FOR SCREENING MAMMOGRAM FOR MALIGNANT NEOPLASM OF BREAST: ICD-10-CM

## 2025-01-09 DIAGNOSIS — K29.70 GASTRITIS WITHOUT BLEEDING, UNSPECIFIED CHRONICITY, UNSPECIFIED GASTRITIS TYPE: ICD-10-CM

## 2025-01-09 DIAGNOSIS — B19.20 HEPATITIS C VIRUS INFECTION WITHOUT HEPATIC COMA, UNSPECIFIED CHRONICITY: ICD-10-CM

## 2025-01-09 DIAGNOSIS — I10 PRIMARY HYPERTENSION: Primary | ICD-10-CM

## 2025-01-09 DIAGNOSIS — Z87.891 PERSONAL HISTORY OF TOBACCO USE: ICD-10-CM

## 2025-01-09 DIAGNOSIS — M62.838 MUSCLE SPASM: ICD-10-CM

## 2025-01-09 DIAGNOSIS — Z79.899 OTHER LONG TERM (CURRENT) DRUG THERAPY: ICD-10-CM

## 2025-01-09 DIAGNOSIS — I65.22 INTERNAL CAROTID ARTERY OCCLUSION, LEFT: ICD-10-CM

## 2025-01-09 DIAGNOSIS — Z12.4 CERVICAL CANCER SCREENING: ICD-10-CM

## 2025-01-09 DIAGNOSIS — G47.9 DIFFICULTY SLEEPING: ICD-10-CM

## 2025-01-09 DIAGNOSIS — R39.15 URINARY URGENCY: ICD-10-CM

## 2025-01-09 PROBLEM — K92.1 HEMATOCHEZIA: Status: ACTIVE | Noted: 2018-06-13

## 2025-01-09 PROBLEM — B96.20 E COLI BACTEREMIA: Status: RESOLVED | Noted: 2022-09-26 | Resolved: 2025-01-09

## 2025-01-09 PROBLEM — R78.81 E COLI BACTEREMIA: Status: RESOLVED | Noted: 2022-09-26 | Resolved: 2025-01-09

## 2025-01-09 PROBLEM — K27.9 PUD (PEPTIC ULCER DISEASE): Status: ACTIVE | Noted: 2019-06-10

## 2025-01-09 PROBLEM — N10 ACUTE PYELONEPHRITIS: Status: RESOLVED | Noted: 2022-09-25 | Resolved: 2025-01-09

## 2025-01-09 PROBLEM — G45.9 TIA (TRANSIENT ISCHEMIC ATTACK): Chronic | Status: ACTIVE | Noted: 2020-06-08

## 2025-01-09 PROBLEM — E78.01 FAMILIAL HYPERCHOLESTEROLEMIA: Chronic | Status: ACTIVE | Noted: 2024-03-21

## 2025-01-09 PROBLEM — M25.562 ACUTE PAIN OF LEFT KNEE: Status: ACTIVE | Noted: 2019-05-02

## 2025-01-09 PROBLEM — L03.011 CELLULITIS OF FINGER OF RIGHT HAND: Status: ACTIVE | Noted: 2017-12-12

## 2025-01-09 PROBLEM — S83.212A BUCKET-HANDLE TEAR OF MEDIAL MENISCUS OF LEFT KNEE AS CURRENT INJURY: Status: ACTIVE | Noted: 2019-07-25

## 2025-01-09 PROBLEM — M15.0 PRIMARY OSTEOARTHRITIS INVOLVING MULTIPLE JOINTS: Chronic | Status: ACTIVE | Noted: 2021-03-31

## 2025-01-09 PROBLEM — K58.2 IRRITABLE BOWEL SYNDROME WITH BOTH CONSTIPATION AND DIARRHEA: Status: ACTIVE | Noted: 2022-07-06

## 2025-01-09 PROBLEM — G31.9 CEREBRAL ATROPHY, MILD (HCC): Status: ACTIVE | Noted: 2023-01-24

## 2025-01-09 PROBLEM — F33.3 MAJOR DEPRESSIVE DISORDER, RECURRENT EPISODE WITH MOOD-CONGRUENT PSYCHOTIC FEATURES (HCC): Chronic | Status: ACTIVE | Noted: 2024-08-28

## 2025-01-09 PROBLEM — F98.8 ATTENTION DEFICIT DISORDER: Status: ACTIVE | Noted: 2022-07-27

## 2025-01-09 PROBLEM — E87.20 LACTIC ACIDOSIS: Status: RESOLVED | Noted: 2022-09-25 | Resolved: 2025-01-09

## 2025-01-09 PROBLEM — A41.9 SEVERE SEPSIS WITH ACUTE ORGAN DYSFUNCTION (HCC): Status: RESOLVED | Noted: 2022-09-25 | Resolved: 2025-01-09

## 2025-01-09 PROBLEM — R65.20 SEVERE SEPSIS WITH ACUTE ORGAN DYSFUNCTION (HCC): Status: RESOLVED | Noted: 2022-09-25 | Resolved: 2025-01-09

## 2025-01-09 PROBLEM — F11.93 OPIATE WITHDRAWAL (HCC): Status: ACTIVE | Noted: 2018-05-21

## 2025-01-09 PROBLEM — R44.1 HALLUCINATION, VISUAL: Status: RESOLVED | Noted: 2023-01-20 | Resolved: 2025-01-09

## 2025-01-09 PROBLEM — F17.200 TOBACCO DEPENDENCE: Chronic | Status: ACTIVE | Noted: 2019-08-14

## 2025-01-09 PROBLEM — I65.23 BILATERAL CAROTID ARTERY STENOSIS: Status: ACTIVE | Noted: 2024-03-21

## 2025-01-09 PROBLEM — Z90.49 S/P CHOLE: Status: ACTIVE | Noted: 2022-03-02

## 2025-01-09 PROBLEM — C44.212 BCC (BASAL CELL CARCINOMA), EAR, RIGHT: Status: ACTIVE | Noted: 2019-06-10

## 2025-01-09 PROBLEM — N39.0 COMPLICATED UTI (URINARY TRACT INFECTION): Status: RESOLVED | Noted: 2022-09-26 | Resolved: 2025-01-09

## 2025-01-09 PROBLEM — M17.12 PRIMARY OSTEOARTHRITIS OF LEFT KNEE: Status: ACTIVE | Noted: 2019-05-02

## 2025-01-09 PROBLEM — R60.0 BILATERAL LEG EDEMA: Status: ACTIVE | Noted: 2020-11-30

## 2025-01-09 PROBLEM — N17.9 AKI (ACUTE KIDNEY INJURY) (HCC): Status: RESOLVED | Noted: 2022-09-25 | Resolved: 2025-01-09

## 2025-01-09 PROBLEM — R41.82 AMS (ALTERED MENTAL STATUS): Status: RESOLVED | Noted: 2024-09-25 | Resolved: 2025-01-09

## 2025-01-09 PROBLEM — F41.1 GAD (GENERALIZED ANXIETY DISORDER): Status: ACTIVE | Noted: 2018-05-21

## 2025-01-09 RX ORDER — PANTOPRAZOLE SODIUM 40 MG/1
40 TABLET, DELAYED RELEASE ORAL 2 TIMES DAILY
Qty: 30 TABLET | Refills: 0 | Status: SHIPPED | OUTPATIENT
Start: 2025-01-09

## 2025-01-09 RX ORDER — TRAZODONE HYDROCHLORIDE 100 MG/1
200 TABLET ORAL NIGHTLY
Qty: 30 TABLET | Refills: 0 | Status: SHIPPED | OUTPATIENT
Start: 2025-01-09 | End: 2025-02-08

## 2025-01-09 RX ORDER — SIMVASTATIN 20 MG
20 TABLET ORAL NIGHTLY
Qty: 30 TABLET | Refills: 54 | Status: SHIPPED | OUTPATIENT
Start: 2025-01-09 | End: 2025-01-10 | Stop reason: SDUPTHER

## 2025-01-09 RX ORDER — TIZANIDINE HYDROCHLORIDE 4 MG/1
4 CAPSULE, GELATIN COATED ORAL 3 TIMES DAILY PRN
Qty: 30 CAPSULE | Refills: 0 | Status: SHIPPED | OUTPATIENT
Start: 2025-01-09 | End: 2025-01-19

## 2025-01-09 RX ORDER — SOLIFENACIN SUCCINATE 5 MG/1
5 TABLET, FILM COATED ORAL DAILY
Qty: 90 TABLET | Refills: 1 | Status: SHIPPED | OUTPATIENT
Start: 2025-01-09

## 2025-01-09 RX ORDER — SUCRALFATE ORAL 1 G/10ML
1 SUSPENSION ORAL 3 TIMES DAILY
Qty: 210 ML | Refills: 0 | Status: SHIPPED | OUTPATIENT
Start: 2025-01-09 | End: 2025-01-16

## 2025-01-09 ASSESSMENT — PATIENT HEALTH QUESTIONNAIRE - PHQ9
SUM OF ALL RESPONSES TO PHQ QUESTIONS 1-9: 1
SUM OF ALL RESPONSES TO PHQ QUESTIONS 1-9: 1
2. FEELING DOWN, DEPRESSED OR HOPELESS: SEVERAL DAYS
SUM OF ALL RESPONSES TO PHQ QUESTIONS 1-9: 1
SUM OF ALL RESPONSES TO PHQ9 QUESTIONS 1 & 2: 1
SUM OF ALL RESPONSES TO PHQ QUESTIONS 1-9: 1
1. LITTLE INTEREST OR PLEASURE IN DOING THINGS: NOT AT ALL

## 2025-01-09 NOTE — TELEPHONE ENCOUNTER
Simvastatin was called in with 54 refills, pharmacy was calling to clarify is that was correct or a mistake.

## 2025-01-09 NOTE — PATIENT INSTRUCTIONS
Requested medication(s) are due for refill today: Yes  Patient has already received a courtesy refill: No  Other reason request has been forwarded to provider:
can help you decide your lung cancer risk.  What are the risks of screening?  CT screening for lung cancer isn't perfect. It can show an abnormal result when it turns out there wasn't any cancer. This is called a false-positive result. This means you may need more tests to make sure you don't have cancer. These tests can be harmful and cause a lot of worry.  These tests may include more CT scans and invasive testing like a lung biopsy. In a biopsy, the doctor takes a sample of tissue from inside your lung so it can be looked at under a microscope. A biopsy is the only way to tell if you have lung cancer. If the biopsy finds cancer, you and your doctor will have to decide how or whether to treat it.  Some lung cancers found on CT scans are harmless and would not have caused a problem if they had not been found through screening. But because doctors can't tell which ones will turn out to be harmless, most will be treated. This means that you may get treatment--including surgery, radiation, or chemotherapy--that you don't need.  There is a risk of damage to cells or tissue from being exposed to radiation, including the small amounts used in CTs, X-rays, and other medical tests. Over time, exposure to radiation may cause cancer and other health problems. But in most cases, the risk of getting cancer from being exposed to small amounts of radiation is low. It's not a reason to avoid these tests for most people.  What are the benefits of screening?  Your scan may be normal (negative).  For some people who are at higher risk, screening lowers the chance of dying of lung cancer. How much and how long you smoked helps to determine your risk level. Screening can find some cancers early, when treatment may be more likely to work.  What happens after screening?  The results of your CT scan will be sent to your doctor. Someone from your care team will explain the results of your scan and answer any questions you may have. If you

## 2025-01-09 NOTE — PROGRESS NOTES
50-77 with at least a 20 pack-year smoking history who currently smoke or have quit in the last 15 years

## 2025-01-10 RX ORDER — SIMVASTATIN 20 MG
20 TABLET ORAL NIGHTLY
Qty: 30 TABLET | Refills: 4 | Status: SHIPPED | OUTPATIENT
Start: 2025-01-10 | End: 2029-07-08

## 2025-02-12 ENCOUNTER — TELEPHONE (OUTPATIENT)
Dept: VASCULAR SURGERY | Age: 65
End: 2025-02-12

## 2025-02-12 NOTE — TELEPHONE ENCOUNTER
POONAM to schedule carotid us in the [facility] office, after 3/5/25. No appt needed with Dr. Lucas. Patient will be contacted with results.

## 2025-02-17 ENCOUNTER — TELEPHONE (OUTPATIENT)
Dept: FAMILY MEDICINE CLINIC | Age: 65
End: 2025-02-17

## 2025-02-17 NOTE — TELEPHONE ENCOUNTER
#[phone] option 1     Nighat from [facility] called to inform clinical staff that a request for cre[facility]ation for this patient was sent on 2/10/2025 and hasn't been sent back with a signature from Dr. Goodrich.     No clinical staff available for me to ask, so sending a message back as an FYI.     She asked if she could get a call at the number and option #1 above to confirm when the signed copy is being sent please.

## 2025-02-18 NOTE — TELEPHONE ENCOUNTER
Spoke with the [facility] to get information on patient's death (how it happened) but they did not have that information. They gave me the number for the  but there was no answer so I left a message for them to call back so we can get that information so the form can be signed.

## 2025-02-19 NOTE — TELEPHONE ENCOUNTER
Spoke with the [facility] again today and they were able to give me more information. Wrote that down and have put the paperwork on Dr. Goodrich's desk for her to fill out and sign.

## (undated) DEVICE — SOLUTION IV IRRIG POUR BRL 0.9% SODIUM CHL 2F7124

## (undated) DEVICE — COVERALL SURG UNIV POLYPR SLVLSS CUF STYL FASTENING TIE W/O

## (undated) DEVICE — SUTURE BOOT: Brand: DEROYAL

## (undated) DEVICE — SYRINGE CATH TIP 50ML

## (undated) DEVICE — CYSTO: Brand: MEDLINE INDUSTRIES, INC.

## (undated) DEVICE — OPEN-END FLEXI-TIP URETERAL CATHETER: Brand: FLEXI-TIP

## (undated) DEVICE — HEADREST POS OD9IN THICKNESS 2IN RASPBERRY FOAM RNG CUSH

## (undated) DEVICE — SPONGES GAUZE X-RAY 4X4 16PLY

## (undated) DEVICE — SUTURE NONABSORBABLE MONOFILAMENT 7-0 BV-1 1X24 IN PROLENE 8702H

## (undated) DEVICE — Z INACTIVE USE 2535480 CLIP LIG M BLU TI HRT SHP WIRE HORZ 180 PER BX

## (undated) DEVICE — SUTURE MCRYL SZ 4-0 L27IN ABSRB UD L19MM PS-2 1/2 CIR PRIM Y426H

## (undated) DEVICE — ELECTRODE PT RET AD L9FT HI MOIST COND ADH HYDRGEL CORDED

## (undated) DEVICE — LOTION PREP REMV 5OZ IODO CLR TINC OF BENZ DURAPREP

## (undated) DEVICE — STERILE LATEX POWDER FREE SURGICAL GLOVES WITH HYDROGEL COATING: Brand: PROTEXIS

## (undated) DEVICE — BLADE ES ELASTOMERIC COAT INSUL DURABLE BEND UPTO 90DEG

## (undated) DEVICE — SYRINGE TB 1ML NDL 27GA L0.5IN PLAS SLIP TIP CONVENTIONAL

## (undated) DEVICE — APPLICATOR PREP 26ML 0.7% IOD POVACRYLEX 74% ISO ALC ST

## (undated) DEVICE — HYPODERMIC SAFETY NEEDLE: Brand: MAGELLAN

## (undated) DEVICE — SHEET,DRAPE,53X77,STERILE: Brand: MEDLINE

## (undated) DEVICE — SYRINGE, LUER LOCK, 10ML: Brand: MEDLINE

## (undated) DEVICE — SUTURE PROL SZ 6-0 L24IN NONABSORBABLE BLU L9.3MM BV-1 3/8 8805H

## (undated) DEVICE — BASIC SINGLE BASIN 1-LF: Brand: MEDLINE INDUSTRIES, INC.

## (undated) DEVICE — FORCEPS BX L240CM JAW DIA2.4MM ORNG L CAP W/ NDL DISP RAD

## (undated) DEVICE — SUTURE VCRL SZ 3-0 L36IN ABSRB UD L36MM CT-1 1/2 CIR J944H

## (undated) DEVICE — GOWN SIRUS NONREIN XL W/TWL: Brand: MEDLINE INDUSTRIES, INC.

## (undated) DEVICE — TOWEL,OR,DSP,ST,WHITE,DLX,XR,4/PK,20PK/C: Brand: MEDLINE

## (undated) DEVICE — COVER LT HNDL BLU PLAS

## (undated) DEVICE — DECANTER FLD 9IN ST BG FOR ASEP TRNSF OF FLD

## (undated) DEVICE — PROVE COVER: Brand: UNBRANDED

## (undated) DEVICE — 20 ML SYRINGE LUER-LOCK TIP: Brand: MONOJECT

## (undated) DEVICE — TOWEL,STOP FLAG GOLD N-W: Brand: MEDLINE

## (undated) DEVICE — PROTECTOR EYE PT SELF ADH NS OPT GRD LF

## (undated) DEVICE — INTENDED FOR TISSUE SEPARATION, AND OTHER PROCEDURES THAT REQUIRE A SHARP SURGICAL BLADE TO PUNCTURE OR CUT.: Brand: BARD-PARKER ® STAINLESS STEEL BLADES

## (undated) DEVICE — 6 ML SYRINGE LUER-LOCK TIP: Brand: MONOJECT

## (undated) DEVICE — 8F PRUITT F3 OUTLYING SHUNT WITH T-PORT: Brand: PRUITT F3 CAROTID SHUNT

## (undated) DEVICE — CLIP INT SM WIDE RED TI TRNSVRS GRV CHEVRON SHP W/ PRECIS

## (undated) DEVICE — 3M™ IOBAN™ 2 ANTIMICROBIAL INCISE DRAPE 6650EZ: Brand: IOBAN™ 2

## (undated) DEVICE — FOGARTY - HYDRAGRIP SURGICAL - CLAMP INSERTS: Brand: FOGARTY SOFTJAW

## (undated) DEVICE — DRAPE ADOLESCENT  LAPAROTOMY

## (undated) DEVICE — MAJOR SET UP PK

## (undated) DEVICE — STERILE COTTON BALLS LARGE 5/P: Brand: MEDLINE

## (undated) DEVICE — LOOP,VESSEL,MAXI,BLUE,2/PK,STERILE: Brand: MEDLINE

## (undated) DEVICE — SUTURE PROL SZ 6-0 L24IN NONABSORBABLE BLU L13MM C-1 3/8 8726H

## (undated) DEVICE — MERCY FAIRFIELD TURNOVER KIT: Brand: MEDLINE INDUSTRIES, INC.

## (undated) DEVICE — SUTURE PERMAHAND SZ 2-0 L12X18IN NONABSORBABLE BLK SILK A185H

## (undated) DEVICE — SUTURE PERMAHAND SZ 4-0 L18IN NONABSORBABLE BLK SILK BRAID A183H

## (undated) DEVICE — ADHESIVE SKIN CLSR 0.7ML TOP DERMBND ADV

## (undated) DEVICE — BAG DRNGE 2000ML UROLOGY ANTI REFLX CHMBR SAMP PRT